# Patient Record
Sex: FEMALE | Race: BLACK OR AFRICAN AMERICAN | NOT HISPANIC OR LATINO | URBAN - METROPOLITAN AREA
[De-identification: names, ages, dates, MRNs, and addresses within clinical notes are randomized per-mention and may not be internally consistent; named-entity substitution may affect disease eponyms.]

---

## 2023-06-29 ENCOUNTER — INPATIENT (INPATIENT)
Facility: HOSPITAL | Age: 78
LOS: 4 days | Discharge: HOME | End: 2023-07-04
Attending: STUDENT IN AN ORGANIZED HEALTH CARE EDUCATION/TRAINING PROGRAM | Admitting: STUDENT IN AN ORGANIZED HEALTH CARE EDUCATION/TRAINING PROGRAM
Payer: SELF-PAY

## 2023-06-29 VITALS
SYSTOLIC BLOOD PRESSURE: 154 MMHG | RESPIRATION RATE: 18 BRPM | OXYGEN SATURATION: 98 % | DIASTOLIC BLOOD PRESSURE: 80 MMHG | HEART RATE: 88 BPM | TEMPERATURE: 98 F

## 2023-06-29 DIAGNOSIS — N17.9 ACUTE KIDNEY FAILURE, UNSPECIFIED: ICD-10-CM

## 2023-06-29 DIAGNOSIS — Z29.9 ENCOUNTER FOR PROPHYLACTIC MEASURES, UNSPECIFIED: ICD-10-CM

## 2023-06-29 DIAGNOSIS — Z90.710 ACQUIRED ABSENCE OF BOTH CERVIX AND UTERUS: Chronic | ICD-10-CM
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
## 2023-06-29 DIAGNOSIS — E04.9 NONTOXIC GOITER, UNSPECIFIED: ICD-10-CM

## 2023-06-29 DIAGNOSIS — S09.90XA UNSPECIFIED INJURY OF HEAD, INITIAL ENCOUNTER: ICD-10-CM

## 2023-06-29 DIAGNOSIS — I10 ESSENTIAL (PRIMARY) HYPERTENSION: ICD-10-CM

## 2023-06-29 DIAGNOSIS — Z98.890 OTHER SPECIFIED POSTPROCEDURAL STATES: Chronic | ICD-10-CM

## 2023-06-29 LAB
ALBUMIN SERPL ELPH-MCNC: 4.6 G/DL — SIGNIFICANT CHANGE UP (ref 3.3–5)
ALP SERPL-CCNC: 65 U/L — SIGNIFICANT CHANGE UP (ref 40–120)
ALT FLD-CCNC: 12 U/L — SIGNIFICANT CHANGE UP (ref 4–33)
ANION GAP SERPL CALC-SCNC: 8 MMOL/L — SIGNIFICANT CHANGE UP (ref 7–14)
APTT BLD: 28.7 SEC — SIGNIFICANT CHANGE UP (ref 27–36.3)
AST SERPL-CCNC: 30 U/L — SIGNIFICANT CHANGE UP (ref 4–32)
BASOPHILS # BLD AUTO: 0.07 K/UL — SIGNIFICANT CHANGE UP (ref 0–0.2)
BASOPHILS NFR BLD AUTO: 1 % — SIGNIFICANT CHANGE UP (ref 0–2)
BILIRUB SERPL-MCNC: 0.4 MG/DL — SIGNIFICANT CHANGE UP (ref 0.2–1.2)
BLD GP AB SCN SERPL QL: NEGATIVE — SIGNIFICANT CHANGE UP
BUN SERPL-MCNC: 24 MG/DL — HIGH (ref 7–23)
CALCIUM SERPL-MCNC: 9.6 MG/DL — SIGNIFICANT CHANGE UP (ref 8.4–10.5)
CHLORIDE SERPL-SCNC: 103 MMOL/L — SIGNIFICANT CHANGE UP (ref 98–107)
CO2 SERPL-SCNC: 25 MMOL/L — SIGNIFICANT CHANGE UP (ref 22–31)
CREAT SERPL-MCNC: 1.54 MG/DL — HIGH (ref 0.5–1.3)
EGFR: 34 ML/MIN/1.73M2 — LOW
EOSINOPHIL # BLD AUTO: 0.13 K/UL — SIGNIFICANT CHANGE UP (ref 0–0.5)
EOSINOPHIL NFR BLD AUTO: 1.9 % — SIGNIFICANT CHANGE UP (ref 0–6)
GLUCOSE SERPL-MCNC: 79 MG/DL — SIGNIFICANT CHANGE UP (ref 70–99)
HCT VFR BLD CALC: 36.4 % — SIGNIFICANT CHANGE UP (ref 34.5–45)
HGB BLD-MCNC: 11.9 G/DL — SIGNIFICANT CHANGE UP (ref 11.5–15.5)
IANC: 4.49 K/UL — SIGNIFICANT CHANGE UP (ref 1.8–7.4)
IMM GRANULOCYTES NFR BLD AUTO: 0.1 % — SIGNIFICANT CHANGE UP (ref 0–0.9)
INR BLD: 1.01 RATIO — SIGNIFICANT CHANGE UP (ref 0.88–1.16)
LIDOCAIN IGE QN: 18 U/L — SIGNIFICANT CHANGE UP (ref 7–60)
LYMPHOCYTES # BLD AUTO: 1.73 K/UL — SIGNIFICANT CHANGE UP (ref 1–3.3)
LYMPHOCYTES # BLD AUTO: 25.1 % — SIGNIFICANT CHANGE UP (ref 13–44)
MCHC RBC-ENTMCNC: 32.2 PG — SIGNIFICANT CHANGE UP (ref 27–34)
MCHC RBC-ENTMCNC: 32.7 GM/DL — SIGNIFICANT CHANGE UP (ref 32–36)
MCV RBC AUTO: 98.4 FL — SIGNIFICANT CHANGE UP (ref 80–100)
MONOCYTES # BLD AUTO: 0.46 K/UL — SIGNIFICANT CHANGE UP (ref 0–0.9)
MONOCYTES NFR BLD AUTO: 6.7 % — SIGNIFICANT CHANGE UP (ref 2–14)
NEUTROPHILS # BLD AUTO: 4.49 K/UL — SIGNIFICANT CHANGE UP (ref 1.8–7.4)
NEUTROPHILS NFR BLD AUTO: 65.2 % — SIGNIFICANT CHANGE UP (ref 43–77)
NRBC # BLD: 0 /100 WBCS — SIGNIFICANT CHANGE UP (ref 0–0)
NRBC # FLD: 0 K/UL — SIGNIFICANT CHANGE UP (ref 0–0)
PLATELET # BLD AUTO: 206 K/UL — SIGNIFICANT CHANGE UP (ref 150–400)
POTASSIUM SERPL-MCNC: 4.6 MMOL/L — SIGNIFICANT CHANGE UP (ref 3.5–5.3)
POTASSIUM SERPL-SCNC: 4.6 MMOL/L — SIGNIFICANT CHANGE UP (ref 3.5–5.3)
PROT SERPL-MCNC: 9 G/DL — HIGH (ref 6–8.3)
PROTHROM AB SERPL-ACNC: 11.7 SEC — SIGNIFICANT CHANGE UP (ref 10.5–13.4)
RBC # BLD: 3.7 M/UL — LOW (ref 3.8–5.2)
RBC # FLD: 12.2 % — SIGNIFICANT CHANGE UP (ref 10.3–14.5)
RH IG SCN BLD-IMP: POSITIVE — SIGNIFICANT CHANGE UP
SODIUM SERPL-SCNC: 136 MMOL/L — SIGNIFICANT CHANGE UP (ref 135–145)
T3 SERPL-MCNC: 103 NG/DL — SIGNIFICANT CHANGE UP (ref 80–200)
T4 AB SER-ACNC: 5.61 UG/DL — SIGNIFICANT CHANGE UP (ref 5.1–13)
TSH SERPL-MCNC: 1.2 UIU/ML — SIGNIFICANT CHANGE UP (ref 0.27–4.2)
WBC # BLD: 6.89 K/UL — SIGNIFICANT CHANGE UP (ref 3.8–10.5)
WBC # FLD AUTO: 6.89 K/UL — SIGNIFICANT CHANGE UP (ref 3.8–10.5)

## 2023-06-29 PROCEDURE — 99223 1ST HOSP IP/OBS HIGH 75: CPT

## 2023-06-29 PROCEDURE — 99285 EMERGENCY DEPT VISIT HI MDM: CPT

## 2023-06-29 PROCEDURE — 72170 X-RAY EXAM OF PELVIS: CPT | Mod: 26

## 2023-06-29 PROCEDURE — 73030 X-RAY EXAM OF SHOULDER: CPT | Mod: 26,RT

## 2023-06-29 PROCEDURE — 73080 X-RAY EXAM OF ELBOW: CPT | Mod: 26,RT

## 2023-06-29 PROCEDURE — 76770 US EXAM ABDO BACK WALL COMP: CPT | Mod: 26

## 2023-06-29 PROCEDURE — 99053 MED SERV 10PM-8AM 24 HR FAC: CPT

## 2023-06-29 PROCEDURE — 70450 CT HEAD/BRAIN W/O DYE: CPT | Mod: 26,MA

## 2023-06-29 PROCEDURE — 72125 CT NECK SPINE W/O DYE: CPT | Mod: 26,MA

## 2023-06-29 PROCEDURE — 71046 X-RAY EXAM CHEST 2 VIEWS: CPT | Mod: 26

## 2023-06-29 RX ORDER — LANOLIN ALCOHOL/MO/W.PET/CERES
3 CREAM (GRAM) TOPICAL AT BEDTIME
Refills: 0 | Status: DISCONTINUED | OUTPATIENT
Start: 2023-06-29 | End: 2023-07-04

## 2023-06-29 RX ORDER — ONDANSETRON 8 MG/1
4 TABLET, FILM COATED ORAL EVERY 8 HOURS
Refills: 0 | Status: DISCONTINUED | OUTPATIENT
Start: 2023-06-29 | End: 2023-07-04

## 2023-06-29 RX ORDER — ACETAMINOPHEN 500 MG
650 TABLET ORAL EVERY 6 HOURS
Refills: 0 | Status: DISCONTINUED | OUTPATIENT
Start: 2023-06-29 | End: 2023-06-30

## 2023-06-29 RX ADMIN — Medication 650 MILLIGRAM(S): at 17:22

## 2023-06-29 NOTE — ED ADULT NURSE NOTE - NSFALLRISKINTERV_ED_ALL_ED

## 2023-06-29 NOTE — H&P ADULT - PROBLEM SELECTOR PLAN 1
Mechanical fall at home   -Exam shows contusion to posterior scalp  -CT head and neck showed no acute injury   -Supportive measures   -PT eval   -Fall precautions

## 2023-06-29 NOTE — ED ADULT TRIAGE NOTE - CHIEF COMPLAINT QUOTE
mechanical fall this morning, hematoma to posterior head, endorsing no complaints- no blood thinners- hx HTN mechanical fall this morning, hematoma to posterior head, endorsing no complaints- no blood thinners-

## 2023-06-29 NOTE — H&P ADULT - NSHPLABSRESULTS_GEN_ALL_CORE
11.9   6.89  )-----------( 206      ( 29 Jun 2023 11:56 )             36.4     Hgb Trend: 11.9<--  06-29    136  |  103  |  24<H>  ----------------------------<  79  4.6   |  25  |  1.54<H>    Ca    9.6      29 Jun 2023 11:07    TPro  9.0<H>  /  Alb  4.6  /  TBili  0.4  /  DBili  x   /  AST  30  /  ALT  12  /  AlkPhos  65  06-29    Creatinine Trend: 1.54<--  PT/INR - ( 29 Jun 2023 11:56 )   PT: 11.7 sec;   INR: 1.01 ratio         PTT - ( 29 Jun 2023 11:56 )  PTT:28.7 sec      Urinalysis Basic - ( 29 Jun 2023 11:07 )    Color: x / Appearance: x / SG: x / pH: x  Gluc: 79 mg/dL / Ketone: x  / Bili: x / Urobili: x   Blood: x / Protein: x / Nitrite: x   Leuk Esterase: x / RBC: x / WBC x   Sq Epi: x / Non Sq Epi: x / Bacteria: x        CT head and neck as reviewed by the radiologist: Cervical spine CT: No acute fractures or dislocations.    Multilevel cervical spondylosis.    Enlarged multinodular thyroid goiter which is only partially imaged.   Retrosternal/mediastinal extension is not excluded. Recommend ultrasound   correlation.    Head CT: No acute intracranial hemorrhage, mass effect, or shift of the   midline structures.    Mild chronic white matter microvascular type changes.        Xray shoulder and elbow as reviewed by the radiologist: No acute fracture or dislocation.

## 2023-06-29 NOTE — ED PROVIDER NOTE - CARE PLAN
1 Principal Discharge DX:	Head trauma   Principal Discharge DX:	Head trauma  Secondary Diagnosis:	IRENE (acute kidney injury)  Secondary Diagnosis:	Thyroid goiter

## 2023-06-29 NOTE — ED PROVIDER NOTE - PROGRESS NOTE DETAILS
[facility] PGY2  ENT consulted for mass effect caused by thyroid goiter and recommends CTS consult for mediastinum involvement. Discussed with hospitalist - will admit for further management.

## 2023-06-29 NOTE — CONSULT NOTE ADULT - SUBJECTIVE AND OBJECTIVE BOX
CC: thyroid goiter    HPI: 77 y/o F with history of diabetes, hypertension, hyperlipidemia presents with pain to the back of her head after mechanical fall.  States that while she was taking her shower this morning, she went to turn down the faucet and then slipped fell backwards hitting the back of her head against the bathtub. ENT consulted for incidental finding of large thyroid goiter with tracheal deviation to the left and possible retrosternal/mediastinal extension in the CT head image.    Patient seen and examined at bedside.        PAST MEDICAL & SURGICAL HISTORY:    Allergies    No Known Allergies    Intolerances      MEDICATIONS  (STANDING):    MEDICATIONS  (PRN):      Social History: never smoker    Family history: No pertinent family history in first degree relatives    ROS:   ENT: all negative except as noted in HPI   CV: denies palpitations  Pulm: denies SOB, cough, hemoptysis  GI: denies change in appetite, indigestion, n/v  : denies pertinent urinary symptoms, urgency  Neuro: denies numbness/tingling, loss of sensation  Psych: denies anxiety  MS: denies muscle weakness, instability  Heme: denies easy bruising or bleeding  Endo: denies heat/cold intolerance, excessive sweating  Vascular: denies LE edema    Vital Signs Last 24 Hrs  T(C): 36.8 (29 Jun 2023 07:49), Max: 36.8 (29 Jun 2023 07:49)  T(F): 98.2 (29 Jun 2023 07:49), Max: 98.2 (29 Jun 2023 07:49)  HR: 88 (29 Jun 2023 07:49) (88 - 88)  BP: 154/80 (29 Jun 2023 07:49) (154/80 - 154/80)  BP(mean): --  RR: 18 (29 Jun 2023 07:49) (18 - 18)  SpO2: 98% (29 Jun 2023 07:49) (98% - 98%)    Parameters below as of 29 Jun 2023 07:49  Patient On (Oxygen Delivery Method): room air                              11.9   6.89  )-----------( 206      ( 29 Jun 2023 11:56 )             36.4         PT/INR - ( 29 Jun 2023 11:56 )   PT: 11.7 sec;   INR: 1.01 ratio         PTT - ( 29 Jun 2023 11:56 )  PTT:28.7 sec    PHYSICAL EXAM:  Gen: NAD  Skin: No rashes, bruises, or lesions  Head: Normocephalic, Atraumatic  Face: no edema, erythema, or fluctuance. Parotid glands soft without mass  Eyes: no scleral injection  Ears: No evidence of any fluid drainage. No mastoid tenderness, erythema, or ear bulging  Nose: Nares bilaterally patent, no discharge  Mouth: No stridor, no drooling, no trismus.  Mucosa moist, tongue/uvula midline, oropharynx clear  Neck: Flat, supple, no lymphadenopathy, trachea midline, no masses  Lymphatic: No lymphadenopathy  Resp: breathing easily, no stridor  CV: no peripheral edema/cyanosis  GI: nondistended   Peripheral vascular: no JVD or edema  Neuro: facial nerve intact, no facial droop    Fiberoptic Indirect laryngoscopy:  (Scope #2 used)    IMAGING/ADDITIONAL STUDIES:   < from: CT Cervical Spine No Cont (06.29.23 @ 10:34) >  ACC: [id number] EXAM:  CT CERVICAL SPINE   ORDERED BY: MARGO BOONE     ACC: [id number] EXAM:  CT BRAIN   ORDERED BY: MARGO BOONE     PROCEDURE DATE:  06/29/2023          INTERPRETATION:  .    CLINICAL INFORMATION: Head trauma. Trauma.    TECHNIQUE: Transaxial CT images were obtained through the cervical spine   and head without the administration of IV contrast. Sagittal and coronal   reformatted images were obtained from the source data.    COMPARISON: None available.    FINDINGS:    NONCONTRAST CERVICAL SPINE CT: No acute fractures or dislocations are   notable. The cervical alignment is maintained. There is no loss of   vertebral body height. Scattered degenerative lucencies and areas of   sclerosis are notable throughout the vertebral bodies and facets.   Multilevel degenerative disc disease is noted, worst at the C3-C4, C5-C6,   and at C6-C7 levels with reactive endplate changes. Multilevel facet   arthrosis is notable. The dens is intact. The lateral masses of C1 are   not displaced. There is no prevertebral soft tissue swelling.    Evaluation of the intraspinal contents is limited on CT modality. There   are variable degrees of multilevel canal and foraminal stenosis secondary   to endplate degenerative changes and facet arthrosis.    A diffusely enlarged heterogeneous thyroid gland is seen which contains   multiple nodules and is only partially imaged. Mass effect is seen upon   the trachea which is shifted to the left side and minimally narrowed. The   trachea is widely patent Retrosternal/substernal extension is excluded.    Arterial vascular calcifications are seen. The imaged lung apices appear   clear.    NONCONTRAST HEAD CT: There is no acute intracranial hemorrhage, mass   effect, shift of the midline structures, herniation, extra-axial fluid   collection, or hydrocephalus.    There is diffuse cerebral volume loss with prominence of the sulci,   fissures, and cisternal spaces which is normal for the patient's age.   There is mild deep and periventricular white matter hypoattenuation   statistically compatible with microvascular changes given calcific   atherosclerotic disease of the intracranial arteries.    The paranasal sinuses and tympanomastoid cavities are clear. The   calvarium is intact. The imaged orbits are unremarkable.    IMPRESSION:    Cervical spine CT: No acute fractures or dislocations.    Multilevel cervical spondylosis.    Enlarged multinodular thyroid goiter which is only partially imaged.   Retrosternal/mediastinal extension is not excluded. Recommend ultrasound   correlation.    Head CT: No acute intracranial hemorrhage, mass effect, or shift of the   midline structures.    Mild chronic white matter microvascular type changes.    --- End of Report ---            TAMMI LINDSEY MD; Attending Radiologist  This document has been electronically signed. Jun 29 2023 10:51AM    < end of copied text >   CC: thyroid goiter    HPI: 79 y/o F with history of diabetes, hypertension, hyperlipidemia presents with pain to the back of her head after mechanical fall.  States that while she was taking her shower this morning, she went to turn down the faucet and then slipped fell backwards hitting the back of her head against the bathtub. ENT consulted for incidental finding of large thyroid goiter with tracheal deviation to the left and possible retrosternal/mediastinal extension in the CT head image.    Patient seen and examined at bedside. No acute complaints. Patient states she is aware her thyroid issue, had biopsy done in 2022 in [place], path report shows normal thyroid tissue. Denies SOB, difficulty breathing, dysphagia to liquid or solid, and chest pain.      PAST MEDICAL & SURGICAL HISTORY:    Allergies    No Known Allergies    Intolerances      MEDICATIONS  (STANDING):    MEDICATIONS  (PRN):      Social History: never smoker    Family history: No pertinent family history in first degree relatives    ROS:   ENT: all negative except as noted in HPI   CV: denies palpitations  Pulm: denies SOB, cough, hemoptysis  GI: denies change in appetite, indigestion, n/v  : denies pertinent urinary symptoms, urgency  Neuro: denies numbness/tingling, loss of sensation  Psych: denies anxiety  MS: denies muscle weakness, instability  Heme: denies easy bruising or bleeding  Endo: denies heat/cold intolerance, excessive sweating  Vascular: denies LE edema    Vital Signs Last 24 Hrs  T(C): 36.8 (29 Jun 2023 07:49), Max: 36.8 (29 Jun 2023 07:49)  T(F): 98.2 (29 Jun 2023 07:49), Max: 98.2 (29 Jun 2023 07:49)  HR: 88 (29 Jun 2023 07:49) (88 - 88)  BP: 154/80 (29 Jun 2023 07:49) (154/80 - 154/80)  BP(mean): --  RR: 18 (29 Jun 2023 07:49) (18 - 18)  SpO2: 98% (29 Jun 2023 07:49) (98% - 98%)    Parameters below as of 29 Jun 2023 07:49  Patient On (Oxygen Delivery Method): room air                              11.9   6.89  )-----------( 206      ( 29 Jun 2023 11:56 )             36.4         PT/INR - ( 29 Jun 2023 11:56 )   PT: 11.7 sec;   INR: 1.01 ratio         PTT - ( 29 Jun 2023 11:56 )  PTT:28.7 sec    PHYSICAL EXAM:  Gen: NAD  Skin: No rashes, bruises, or lesions  Head: Normocephalic, Atraumatic  Face: no edema, erythema, or fluctuance. Parotid glands soft without mass  Eyes: no scleral injection  Ears: No evidence of any fluid drainage. No mastoid tenderness, erythema, or ear bulging  Nose: Nares bilaterally patent, no discharge  Mouth: No stridor, no drooling, no trismus.  Mucosa moist, tongue/uvula midline, oropharynx clear  Neck: + diffuse goiter in the neck. Flat, supple, no lymphadenopathy, trachea midline, no masses  Lymphatic: No lymphadenopathy  Resp: breathing easily, no stridor  CV: no peripheral edema/cyanosis  GI: nondistended   Peripheral vascular: no JVD or edema  Neuro: facial nerve intact, no facial droop    Fiberoptic Indirect laryngoscopy:  (Scope #2 used)    IMAGING/ADDITIONAL STUDIES:   < from: CT Cervical Spine No Cont (06.29.23 @ 10:34) >  ACC: [id number] EXAM:  CT CERVICAL SPINE   ORDERED BY: MARGO BOONE     ACC: [id number] EXAM:  CT BRAIN   ORDERED BY: MARGO BOONE     PROCEDURE DATE:  06/29/2023          INTERPRETATION:  .    CLINICAL INFORMATION: Head trauma. Trauma.    TECHNIQUE: Transaxial CT images were obtained through the cervical spine   and head without the administration of IV contrast. Sagittal and coronal   reformatted images were obtained from the source data.    COMPARISON: None available.    FINDINGS:    NONCONTRAST CERVICAL SPINE CT: No acute fractures or dislocations are   notable. The cervical alignment is maintained. There is no loss of   vertebral body height. Scattered degenerative lucencies and areas of   sclerosis are notable throughout the vertebral bodies and facets.   Multilevel degenerative disc disease is noted, worst at the C3-C4, C5-C6,   and at C6-C7 levels with reactive endplate changes. Multilevel facet   arthrosis is notable. The dens is intact. The lateral masses of C1 are   not displaced. There is no prevertebral soft tissue swelling.    Evaluation of the intraspinal contents is limited on CT modality. There   are variable degrees of multilevel canal and foraminal stenosis secondary   to endplate degenerative changes and facet arthrosis.    A diffusely enlarged heterogeneous thyroid gland is seen which contains   multiple nodules and is only partially imaged. Mass effect is seen upon   the trachea which is shifted to the left side and minimally narrowed. The   trachea is widely patent Retrosternal/substernal extension is excluded.    Arterial vascular calcifications are seen. The imaged lung apices appear   clear.    NONCONTRAST HEAD CT: There is no acute intracranial hemorrhage, mass   effect, shift of the midline structures, herniation, extra-axial fluid   collection, or hydrocephalus.    There is diffuse cerebral volume loss with prominence of the sulci,   fissures, and cisternal spaces which is normal for the patient's age.   There is mild deep and periventricular white matter hypoattenuation   statistically compatible with microvascular changes given calcific   atherosclerotic disease of the intracranial arteries.    The paranasal sinuses and tympanomastoid cavities are clear. The   calvarium is intact. The imaged orbits are unremarkable.    IMPRESSION:    Cervical spine CT: No acute fractures or dislocations.    Multilevel cervical spondylosis.    Enlarged multinodular thyroid goiter which is only partially imaged.   Retrosternal/mediastinal extension is not excluded. Recommend ultrasound   correlation.    Head CT: No acute intracranial hemorrhage, mass effect, or shift of the   midline structures.    Mild chronic white matter microvascular type changes.    --- End of Report ---            TAMMI LINDSEY MD; Attending Radiologist  This document has been electronically signed. Jun 29 2023 10:51AM    < end of copied text >

## 2023-06-29 NOTE — H&P ADULT - PROBLEM SELECTOR PLAN 2
CT cervical spine showed thyroid goiter   -Pt has known hx of thyroid goiter   -Denies any choking sensation or SOB   -TSH and T4 are WNL   -ENT recs appreciated   -Plan to obtain CT neck and chest with IV contrast once Scr improves   -IR consult pending CT neck and chest CT cervical spine showed thyroid goiter   -Pt has known hx of thyroid goiter. She underwent biopsy in [place] about 2-3 years ago and was told that it is not cancerous. Advised patient to obtain those reports.   -Denies any choking sensation or SOB   -TSH and T4 are WNL   -ENT recs appreciated   -Plan to obtain CT neck and chest with IV contrast once Scr improves   -IR consult pending CT neck and chest

## 2023-06-29 NOTE — H&P ADULT - NSHPPHYSICALEXAM_GEN_ALL_CORE
Vital Signs Last 24 Hrs  T(C): 36.1 (29 Jun 2023 13:11), Max: 36.8 (29 Jun 2023 07:49)  T(F): 97 (29 Jun 2023 13:11), Max: 98.2 (29 Jun 2023 07:49)  HR: 69 (29 Jun 2023 13:11) (69 - 88)  BP: 163/80 (29 Jun 2023 13:11) (154/80 - 163/80)  BP(mean): --  RR: 18 (29 Jun 2023 13:11) (18 - 18)  SpO2: 99% (29 Jun 2023 13:11) (98% - 99%)    Parameters below as of 29 Jun 2023 13:11  Patient On (Oxygen Delivery Method): room air    GENERAL: NAD, well-developed  HEENT: Normocephalic ,contusion to posterior scalp. Conjunctiva and sclera clear, oral mucosa moist, clear w/o any exudate   NECK: Supple, No JVD  CHEST/LUNG: Clear to auscultation bilaterally; No wheeze  HEART: Regular rate and rhythm; No murmurs, rubs, or gallops  ABDOMEN: Soft, Nontender, Nondistended; Bowel sounds present  EXTREMITIES:  2+ Peripheral Pulses, No clubbing, cyanosis, or edema  PSYCH: AAOx3, normal affect  NEUROLOGY: non-focal, cranial nerve 2-12 grossly intact, strength 5/5 in all extremities, sensation grossly intact  SKIN: No rashes or lesions

## 2023-06-29 NOTE — CONSULT NOTE ADULT - ASSESSMENT
77 y/o F with history of diabetes, hypertension, hyperlipidemia presents with pain to the back of her head after mechanical fall.  States that while she was taking her shower this morning, she went to turn down the faucet and then slipped fell backwards hitting the back of her head against the bathtub. ENT consulted for incidental finding of large thyroid goiter with tracheal deviation to the left and possible retrosternal/mediastinal extension in the CT head image. No acute respiratory distress, no shortness of breath.  77 y/o F with history of diabetes, hypertension, hyperlipidemia and known thyroid goiter presents with pain to the back of her head after mechanical fall. ENT consulted for incidental finding of large thyroid goiter with tracheal deviation to the left and possible retrosternal/mediastinal extension in the CT head image. Patient has diffuse goiter. Prior biopsy done in [place] on 2022 shows normal thyroid tissue per patient. Denies compressive symptoms.

## 2023-06-29 NOTE — H&P ADULT - HISTORY OF PRESENT ILLNESS
77 yo F with PMhx of DMII, HTN, HLD, and known thyroid goiter presents to the ED after a fall. Patient had a mechanical fall in the bathroom. She slipped and fell in the shower when she was trying to turn the faucet. He hit her head but did not lose consciousness. Afterwards, she was able to get up by herself and was able to ambulate. However, she developed pain in the back of her head and R shoulder. It was followed by swelling and worsening pain, prompting her to come to the ED. Otherwise, she denies any fever, chills, chest pain, SOB, palpitations, dysphagia, or LE edema.   In the ED, her vitals were notable for HTN. Labs were notable for IRENE. CT head and neck shoulder no new fracture, acute bleeding or stroke. However, CT neck showed Enlarged multinodular thyroid goiter. ENT was consulted.

## 2023-06-29 NOTE — H&P ADULT - ASSESSMENT
79 yo F with PMhx of DMII, HTN, HLD, and known thyroid goiter presents to the ED after a mechanical fall, found to have thyroid goiter.

## 2023-06-29 NOTE — ED PROVIDER NOTE - ATTENDING CONTRIBUTION TO CARE
This is a 78-year-old female with a past medical history of diabetes hypertension hyperlipidemia she presents with pain to the back of her head after mechanical fall trying to get out of the shower.  She states she was taking a shower and it was too hot so she went to turn on the faucet she slipped backwards hitting her head.  No loss of consciousness.  She states she is having some pain to the back of her head as well as her right shoulder and right arm.  She states she feels back to baseline and she was able to ambulate after the fall.  She denies any nausea any vomiting any dizziness any chest pain any shortness of breath.  She states that she does have neuropathy to her lower extremities unchanged.  She states she has not had insurance for very long time therefore she does not have a primary care doctor for follow-up.  General: Well appearing, well nourished, in no distress  Head: Normocephalic ,contusion to posterior scalp   Eyes: Conjunctiva clear, sclera non-icteric, EOM intact, PERRL  Mouth: Mucous membranes moist, no mucosal lesions.  Neck: Supple  Heart: Regular rate and rhythm, no murmur or gallop  Lungs: Clear to auscultation  Abdomen: soft, no tenderness, non distended, no organomegaly, masses  Back: Spine normal without deformity or tenderness, no CVA tenderness  Extremities: No amputations or deformities, cyanosis, edema  Musculoskeletal: No crepitation, defects or decreased range of motion, strength intact throughout, pulses intact  Neurologic: No gross deficits CN II-XII intact Sensation intact no dysmetria   Psychiatric: Oriented X3, normal mood and affect  Skin: Warm,dry. Good turgor, no rash, unusual bruising, Nailbed changes old in nature maceration between toes on right and left no evidence of infection contusion to scalp and right posterior forearm     Will check fingerstick ekg  pt fall mechanical in nature no blood thinner use but due to age will obtain imaging ct brain to r/o intracranial pathology and image R arm to rule out fracture

## 2023-06-29 NOTE — H&P ADULT - NSHPREVIEWOFSYSTEMS_GEN_ALL_CORE
REVIEW OF SYSTEMS:    CONSTITUTIONAL: No weakness, fevers or chills  EYES/ENT: No visual changes;  No vertigo or throat pain. +headache  NECK: No pain or stiffness  RESPIRATORY: No cough, wheezing, hemoptysis; No shortness of breath  CARDIOVASCULAR: No chest pain or palpitations  GASTROINTESTINAL: No abdominal or epigastric pain. No nausea, vomiting, or hematemesis; No diarrhea or constipation. No melena or hematochezia.  GENITOURINARY: No dysuria, frequency or hematuria  NEUROLOGICAL: No numbness or weakness  MUSCULOSKELETAL: No joint pain, no muscle ache   SKIN: No itching, burning, rashes, or lesions   All other review of systems is negative unless indicated above.

## 2023-06-29 NOTE — ED PROVIDER NOTE - CLINICAL SUMMARY MEDICAL DECISION MAKING FREE TEXT BOX
Patient is a 78-year-old female with history of diabetes, hypertension, hyperlipidemia presents with pain to the back of her head after mechanical fall.  States that while she was taking her shower this morning, she went to turn down the faucet and then slipped fell backwards hitting the back of her head against the bathtub.  Currently complaining of pain to the back of the head, right shoulder and right elbow.  Denies any recent fever, chills, night sweats, nausea, vomiting, chest pain, shortness of breath, abdominal pain, dysuria, hematuria, urinary frequency, diarrhea, bloody or black stool.     Vitals: I have reviewed the patients vital signs  General: Well dressed, well appearing, no acute distress  HEENT: Normocephalic, airway patent, hematoma noted in the posterior occipital region, no abrasions/laceration noted   Eyes: EOMI, tracking appropriately  Neck: no tracheal deviation, no JVD  Chest/Lungs: no trauma, symmetric chest rise, speaking in complete sentences, no WOB, CTA BL   Heart: skin and extremities well perfused, regular rate and rhythm  Abdomen: soft, nontender and nondistended   Neuro: A+Ox3, CN II-XI intact  MSK: 5/5 strength and normal sensation in all 4 extremities  Skin: contusion noted in the dorsal aspect of proximal forearm     Likely contusion after trauma.  Will rule out intracranial hemorrhage with CT head.  We will also obtain x-ray of the right shoulder and right elbow to assess for possible injuries.  We will also obtain x-ray of the chest and pelvis given the mechanism.  Patient is otherwise well-appearing and hemodynamically stable.  Disposition pending work-up.

## 2023-06-29 NOTE — ED ADULT NURSE NOTE - CHIEF COMPLAINT QUOTE
mechanical fall this morning, hematoma to posterior head, endorsing no complaints- no blood thinners-

## 2023-06-29 NOTE — ED ADULT NURSE NOTE - OBJECTIVE STATEMENT
78 year old female patient aox4  brought in by EMS S/P fall. 78 year old female patient aox4  brought in by EMS S/P fall. Reported that while taking shower this morning she slipped on the soapy water then fell.  Denied lOC, lightheaded, dizziness, HA, nausea but hit the back of her head.  Hematoma noted to the back of her head, Hx of dm and htn. Also taking aspirin.  CT scan ordered as well as Xrays. Will  continue to monitor for any changes.

## 2023-06-29 NOTE — CONSULT NOTE ADULT - PROBLEM SELECTOR RECOMMENDATION 9
- Ultrasound of the thyroid  - Ultrasoung guided biopsy of the thyroid  - TSH, FT4  - Please consult CTS for the mediastinum involvement of the thyroid goiter  - Will discuss the case with head and neck attending. - CT neck and chest with IV contrast  - Please contact IR for ultrasound guided biopsy of the thyroid gland  - TSH, FT4  - Case discussed with Dr. Croft

## 2023-06-30 LAB
A1C WITH ESTIMATED AVERAGE GLUCOSE RESULT: 4.7 % — SIGNIFICANT CHANGE UP (ref 4–5.6)
ALBUMIN SERPL ELPH-MCNC: 4.7 G/DL — SIGNIFICANT CHANGE UP (ref 3.3–5)
ALP SERPL-CCNC: 66 U/L — SIGNIFICANT CHANGE UP (ref 40–120)
ALT FLD-CCNC: 9 U/L — SIGNIFICANT CHANGE UP (ref 4–33)
ANION GAP SERPL CALC-SCNC: 13 MMOL/L — SIGNIFICANT CHANGE UP (ref 7–14)
AST SERPL-CCNC: 29 U/L — SIGNIFICANT CHANGE UP (ref 4–32)
BASOPHILS # BLD AUTO: 0.1 K/UL — SIGNIFICANT CHANGE UP (ref 0–0.2)
BASOPHILS NFR BLD AUTO: 1.8 % — SIGNIFICANT CHANGE UP (ref 0–2)
BILIRUB SERPL-MCNC: 0.6 MG/DL — SIGNIFICANT CHANGE UP (ref 0.2–1.2)
BUN SERPL-MCNC: 25 MG/DL — HIGH (ref 7–23)
CALCIUM SERPL-MCNC: 9.9 MG/DL — SIGNIFICANT CHANGE UP (ref 8.4–10.5)
CHLORIDE SERPL-SCNC: 104 MMOL/L — SIGNIFICANT CHANGE UP (ref 98–107)
CHOLEST SERPL-MCNC: 242 MG/DL — HIGH
CO2 SERPL-SCNC: 22 MMOL/L — SIGNIFICANT CHANGE UP (ref 22–31)
CREAT ?TM UR-MCNC: 71 MG/DL — SIGNIFICANT CHANGE UP
CREAT SERPL-MCNC: 1.46 MG/DL — HIGH (ref 0.5–1.3)
EGFR: 36 ML/MIN/1.73M2 — LOW
EOSINOPHIL # BLD AUTO: 0.27 K/UL — SIGNIFICANT CHANGE UP (ref 0–0.5)
EOSINOPHIL NFR BLD AUTO: 4.8 % — SIGNIFICANT CHANGE UP (ref 0–6)
ESTIMATED AVERAGE GLUCOSE: 88 — SIGNIFICANT CHANGE UP
GLUCOSE SERPL-MCNC: 76 MG/DL — SIGNIFICANT CHANGE UP (ref 70–99)
HCT VFR BLD CALC: 37.8 % — SIGNIFICANT CHANGE UP (ref 34.5–45)
HDLC SERPL-MCNC: 55 MG/DL — SIGNIFICANT CHANGE UP
HGB BLD-MCNC: 11.9 G/DL — SIGNIFICANT CHANGE UP (ref 11.5–15.5)
IANC: 2.97 K/UL — SIGNIFICANT CHANGE UP (ref 1.8–7.4)
IMM GRANULOCYTES NFR BLD AUTO: 0.2 % — SIGNIFICANT CHANGE UP (ref 0–0.9)
LIPID PNL WITH DIRECT LDL SERPL: 175 MG/DL — HIGH
LYMPHOCYTES # BLD AUTO: 1.84 K/UL — SIGNIFICANT CHANGE UP (ref 1–3.3)
LYMPHOCYTES # BLD AUTO: 32.9 % — SIGNIFICANT CHANGE UP (ref 13–44)
MCHC RBC-ENTMCNC: 31.4 PG — SIGNIFICANT CHANGE UP (ref 27–34)
MCHC RBC-ENTMCNC: 31.5 GM/DL — LOW (ref 32–36)
MCV RBC AUTO: 99.7 FL — SIGNIFICANT CHANGE UP (ref 80–100)
MONOCYTES # BLD AUTO: 0.41 K/UL — SIGNIFICANT CHANGE UP (ref 0–0.9)
MONOCYTES NFR BLD AUTO: 7.3 % — SIGNIFICANT CHANGE UP (ref 2–14)
NEUTROPHILS # BLD AUTO: 2.97 K/UL — SIGNIFICANT CHANGE UP (ref 1.8–7.4)
NEUTROPHILS NFR BLD AUTO: 53 % — SIGNIFICANT CHANGE UP (ref 43–77)
NON HDL CHOLESTEROL: 187 MG/DL — HIGH
NRBC # BLD: 0 /100 WBCS — SIGNIFICANT CHANGE UP (ref 0–0)
NRBC # FLD: 0 K/UL — SIGNIFICANT CHANGE UP (ref 0–0)
PLATELET # BLD AUTO: 193 K/UL — SIGNIFICANT CHANGE UP (ref 150–400)
POTASSIUM SERPL-MCNC: 4.5 MMOL/L — SIGNIFICANT CHANGE UP (ref 3.5–5.3)
POTASSIUM SERPL-SCNC: 4.5 MMOL/L — SIGNIFICANT CHANGE UP (ref 3.5–5.3)
PROT SERPL-MCNC: 8.8 G/DL — HIGH (ref 6–8.3)
RBC # BLD: 3.79 M/UL — LOW (ref 3.8–5.2)
RBC # FLD: 12.4 % — SIGNIFICANT CHANGE UP (ref 10.3–14.5)
SODIUM SERPL-SCNC: 139 MMOL/L — SIGNIFICANT CHANGE UP (ref 135–145)
SODIUM UR-SCNC: 139 MMOL/L — SIGNIFICANT CHANGE UP
TRIGL SERPL-MCNC: 62 MG/DL — SIGNIFICANT CHANGE UP
UUN UR-MCNC: 469 MG/DL — SIGNIFICANT CHANGE UP
WBC # BLD: 5.6 K/UL — SIGNIFICANT CHANGE UP (ref 3.8–10.5)
WBC # FLD AUTO: 5.6 K/UL — SIGNIFICANT CHANGE UP (ref 3.8–10.5)

## 2023-06-30 PROCEDURE — 99232 SBSQ HOSP IP/OBS MODERATE 35: CPT

## 2023-06-30 RX ORDER — HEPARIN SODIUM 5000 [USP'U]/ML
5000 INJECTION INTRAVENOUS; SUBCUTANEOUS EVERY 8 HOURS
Refills: 0 | Status: DISCONTINUED | OUTPATIENT
Start: 2023-06-30 | End: 2023-07-04

## 2023-06-30 RX ORDER — SODIUM CHLORIDE 9 MG/ML
1000 INJECTION, SOLUTION INTRAVENOUS
Refills: 0 | Status: DISCONTINUED | OUTPATIENT
Start: 2023-06-30 | End: 2023-07-04

## 2023-06-30 RX ADMIN — HEPARIN SODIUM 5000 UNIT(S): 5000 INJECTION INTRAVENOUS; SUBCUTANEOUS at 21:39

## 2023-06-30 RX ADMIN — SODIUM CHLORIDE 100 MILLILITER(S): 9 INJECTION, SOLUTION INTRAVENOUS at 18:16

## 2023-06-30 NOTE — PHYSICAL THERAPY INITIAL EVALUATION ADULT - PERTINENT HX OF CURRENT PROBLEM, REHAB EVAL
77 yo F with PMhx of DMII, HTN, HLD, and known thyroid goiter presents to the ED after a fall. Patient had a mechanical fall in the bathroom. She slipped and fell in the shower when she was trying to turn the faucet. He hit her head but did not lose consciousness. Afterwards, she was able to get up by herself and was able to ambulate. However, she developed pain in the back of her head and R shoulder. It was followed by swelling and worsening pain, prompting her to come to the ED. Otherwise, she denies any fever, chills, chest pain, SOB, palpitations, dysphagia, or LE edema.

## 2023-06-30 NOTE — PROGRESS NOTE ADULT - PROBLEM SELECTOR PLAN 3
Scr elevated to 1.54. Patient follows Dr. Marcie Manzanares outpatient and has seen a nephrologist (?Dr. Clement) for a ?mass previously.  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 Manzanares

## 2023-06-30 NOTE — PHYSICAL THERAPY INITIAL EVALUATION ADULT - NSPTDISCHREC_GEN_A_CORE
Outpatient PT in order to address knee discomfort.  Pt encouraged to use her straight cane when ambulating in home.

## 2023-06-30 NOTE — PROGRESS NOTE ADULT - PROBLEM SELECTOR PLAN 1
Mechanical fall at home   -Exam shows contusion to posterior scalp  -CT head and neck showed no acute injury   -Supportive measures   -PT evaluation. Recommends a mccracken. Suggested d/c to rehab with PT vs Home. Daughter making decision with patient about option  -Fall precautions

## 2023-06-30 NOTE — PROGRESS NOTE ADULT - SUBJECTIVE AND OBJECTIVE BOX
PROGRESS NOTE:   Authored by Dr. Nile Matos MD (PGY-1). Pager [facility] / [facility]     Patient is a 78y old  Female who presents with a chief complaint of Fall, thyroid goiter    SUBJECTIVE / OVERNIGHT EVENTS:  No acute events overnight.       MEDICATIONS  (STANDING):    MEDICATIONS  (PRN):  acetaminophen     Tablet .. 650 milliGRAM(s) Oral every 6 hours PRN Temp greater or equal to 38C (100.4F), Mild Pain (1 - 3)  aluminum hydroxide/magnesium hydroxide/simethicone Suspension 30 milliLiter(s) Oral every 4 hours PRN Dyspepsia  melatonin 3 milliGRAM(s) Oral at bedtime PRN Insomnia  ondansetron Injectable 4 milliGRAM(s) IV Push every 8 hours PRN Nausea and/or Vomiting      CAPILLARY BLOOD GLUCOSE        I&O's Summary      PHYSICAL EXAM:  Vital Signs Last 24 Hrs  T(C): 36.4 (30 Jun 2023 12:39), Max: 36.6 (29 Jun 2023 20:02)  T(F): 97.5 (30 Jun 2023 12:39), Max: 97.9 (29 Jun 2023 20:02)  HR: 72 (30 Jun 2023 12:39) (72 - 81)  BP: 142/72 (30 Jun 2023 12:39) (133/74 - 142/72)  BP(mean): --  RR: 17 (30 Jun 2023 12:39) (16 - 17)  SpO2: 100% (30 Jun 2023 12:39) (97% - 100%)    Parameters below as of 30 Jun 2023 12:39  Patient On (Oxygen Delivery Method): room air        CONSTITUTIONAL: NAD, well-developed  HEET: MMM, EOMI, PERRLA  NECK: Bilateral smooth thyroid mass noteed  RESPIRATORY: Normal respiratory effort; lungs are clear to auscultation bilaterally  CARDIOVASCULAR: Regular rate and rhythm, normal S1 and S2, systolic murmur noted; No lower extremity edema; Peripheral pulses are 2+ bilaterally  ABDOMEN: Nontender to palpatio, no rebound/guarding  MUSCULOSKELETAL: no clubbing or cyanosis of digits; no joint swelling or tenderness to palpation  NEURO: Moving all four extremities, sensation grossly intact  PSYCH: A+O to person, place, and time; affect appropriate  SKIN: No rash    LABS:                        11.9   5.60  )-----------( 193      ( 30 Jun 2023 06:15 )             37.8     06-30    139  |  104  |  25<H>  ----------------------------<  76  4.5   |  22  |  1.46<H>    Ca    9.9      30 Jun 2023 06:15    TPro  8.8<H>  /  Alb  4.7  /  TBili  0.6  /  DBili  x   /  AST  29  /  ALT  9   /  AlkPhos  66  06-30    PT/INR - ( 29 Jun 2023 11:56 )   PT: 11.7 sec;   INR: 1.01 ratio         PTT - ( 29 Jun 2023 11:56 )  PTT:28.7 sec      Urinalysis Basic - ( 30 Jun 2023 06:15 )    Color: x / Appearance: x / SG: x / pH: x  Gluc: 76 mg/dL / Ketone: x  / Bili: x / Urobili: x   Blood: x / Protein: x / Nitrite: x   Leuk Esterase: x / RBC: x / WBC x   Sq Epi: x / Non Sq Epi: x / Bacteria: x

## 2023-06-30 NOTE — PROGRESS NOTE ADULT - PROBLEM SELECTOR PLAN 2
CT cervical spine showed thyroid goiter   -Pt has known hx of thyroid goiter. She underwent biopsy in [place] about 2-3 years ago and was told that it is not cancerous. Advised patient to obtain those reports.   -Denies any choking sensation or SOB   -TSH and T4 are WNL   -ENT recs appreciated   -Plan to obtain CT neck and chest with IV contrast once Scr improves   -IR consult pending CT neck and chest  - Patient and daughter would like to go ahead and try to get a biopsy

## 2023-06-30 NOTE — PROGRESS NOTE ADULT - ATTENDING COMMENTS
Patient is a 79yo F with PMH significant for T2DM, HTN, HLD, CKD, and known thyroid goiter who presented after a mechanical fall while trying turn on the faucet in her shower. She has had 4 falls in total, most recent prior fall was 2021.    Seen by PT, recommended for outpatient PT. No significant contusions noted on exam today. No fractures or dislocations noted on trauma imaging.    Cr elevated – 1.54 on presentation, then 1.46 on repeat. Trend for now. Check FENa. Unclear what patient’s baseline Cr is, will ask PMD/daughter for collateral.    Patient was planned for work-up of thyroid goiter inpatient but seems to have had prior work-up done. ENT evaluated patient, had recommended CT neck and chest, and IR for US-guided biopsy. Has had a biopsy performed in the past as an outpatient, patient states this was 2022 in [place] and did not show malignancy. The goiter has been present for 6 years. Will see if her daughter or PMD can offer collateral. Will defer contrast imaging for now given possible IRENE. If collateral obtained from daughter/PMD, may potentially be deferred to outpatient follow-up.

## 2023-06-30 NOTE — PHYSICAL THERAPY INITIAL EVALUATION ADULT - ADDITIONAL COMMENTS
Pt reports she lives in a home with her daughter, steps to negotiate, has fell in the past, and uses a cane when needed.

## 2023-07-01 LAB
ANION GAP SERPL CALC-SCNC: 9 MMOL/L — SIGNIFICANT CHANGE UP (ref 7–14)
APPEARANCE UR: CLEAR — SIGNIFICANT CHANGE UP
APPEARANCE UR: CLEAR — SIGNIFICANT CHANGE UP
BACTERIA # UR AUTO: ABNORMAL
BACTERIA # UR AUTO: NEGATIVE — SIGNIFICANT CHANGE UP
BILIRUB UR-MCNC: NEGATIVE — SIGNIFICANT CHANGE UP
BILIRUB UR-MCNC: NEGATIVE — SIGNIFICANT CHANGE UP
BUN SERPL-MCNC: 24 MG/DL — HIGH (ref 7–23)
CALCIUM SERPL-MCNC: 9.8 MG/DL — SIGNIFICANT CHANGE UP (ref 8.4–10.5)
CHLORIDE SERPL-SCNC: 104 MMOL/L — SIGNIFICANT CHANGE UP (ref 98–107)
CO2 SERPL-SCNC: 23 MMOL/L — SIGNIFICANT CHANGE UP (ref 22–31)
COLOR SPEC: SIGNIFICANT CHANGE UP
COLOR SPEC: SIGNIFICANT CHANGE UP
CREAT SERPL-MCNC: 1.47 MG/DL — HIGH (ref 0.5–1.3)
DIFF PNL FLD: ABNORMAL
DIFF PNL FLD: NEGATIVE — SIGNIFICANT CHANGE UP
EGFR: 36 ML/MIN/1.73M2 — LOW
EPI CELLS # UR: 1 /HPF — SIGNIFICANT CHANGE UP (ref 0–5)
EPI CELLS # UR: 2 /HPF — SIGNIFICANT CHANGE UP (ref 0–5)
GLUCOSE SERPL-MCNC: 86 MG/DL — SIGNIFICANT CHANGE UP (ref 70–99)
GLUCOSE UR QL: NEGATIVE — SIGNIFICANT CHANGE UP
GLUCOSE UR QL: NEGATIVE — SIGNIFICANT CHANGE UP
HCT VFR BLD CALC: 35.9 % — SIGNIFICANT CHANGE UP (ref 34.5–45)
HGB BLD-MCNC: 11.3 G/DL — LOW (ref 11.5–15.5)
HYALINE CASTS # UR AUTO: 0 /LPF — SIGNIFICANT CHANGE UP (ref 0–7)
KETONES UR-MCNC: NEGATIVE — SIGNIFICANT CHANGE UP
KETONES UR-MCNC: NEGATIVE — SIGNIFICANT CHANGE UP
LEUKOCYTE ESTERASE UR-ACNC: ABNORMAL
LEUKOCYTE ESTERASE UR-ACNC: NEGATIVE — SIGNIFICANT CHANGE UP
MAGNESIUM SERPL-MCNC: 2.2 MG/DL — SIGNIFICANT CHANGE UP (ref 1.6–2.6)
MCHC RBC-ENTMCNC: 31.1 PG — SIGNIFICANT CHANGE UP (ref 27–34)
MCHC RBC-ENTMCNC: 31.5 GM/DL — LOW (ref 32–36)
MCV RBC AUTO: 98.9 FL — SIGNIFICANT CHANGE UP (ref 80–100)
NITRITE UR-MCNC: NEGATIVE — SIGNIFICANT CHANGE UP
NITRITE UR-MCNC: POSITIVE
NRBC # BLD: 0 /100 WBCS — SIGNIFICANT CHANGE UP (ref 0–0)
NRBC # FLD: 0 K/UL — SIGNIFICANT CHANGE UP (ref 0–0)
PH UR: 6 — SIGNIFICANT CHANGE UP (ref 5–8)
PH UR: 7.5 — SIGNIFICANT CHANGE UP (ref 5–8)
PHOSPHATE SERPL-MCNC: 3 MG/DL — SIGNIFICANT CHANGE UP (ref 2.5–4.5)
PLATELET # BLD AUTO: 191 K/UL — SIGNIFICANT CHANGE UP (ref 150–400)
POTASSIUM SERPL-MCNC: 5.2 MMOL/L — SIGNIFICANT CHANGE UP (ref 3.5–5.3)
POTASSIUM SERPL-SCNC: 5.2 MMOL/L — SIGNIFICANT CHANGE UP (ref 3.5–5.3)
PROT UR-MCNC: ABNORMAL
PROT UR-MCNC: ABNORMAL
RBC # BLD: 3.63 M/UL — LOW (ref 3.8–5.2)
RBC # FLD: 12.3 % — SIGNIFICANT CHANGE UP (ref 10.3–14.5)
RBC CASTS # UR COMP ASSIST: 1 /HPF — SIGNIFICANT CHANGE UP (ref 0–4)
RBC CASTS # UR COMP ASSIST: 2 /HPF — SIGNIFICANT CHANGE UP (ref 0–4)
SODIUM SERPL-SCNC: 136 MMOL/L — SIGNIFICANT CHANGE UP (ref 135–145)
SP GR SPEC: 1.01 — SIGNIFICANT CHANGE UP (ref 1.01–1.05)
SP GR SPEC: 1.01 — SIGNIFICANT CHANGE UP (ref 1.01–1.05)
UROBILINOGEN FLD QL: SIGNIFICANT CHANGE UP
UROBILINOGEN FLD QL: SIGNIFICANT CHANGE UP
WBC # BLD: 5.87 K/UL — SIGNIFICANT CHANGE UP (ref 3.8–10.5)
WBC # FLD AUTO: 5.87 K/UL — SIGNIFICANT CHANGE UP (ref 3.8–10.5)
WBC UR QL: 1 /HPF — SIGNIFICANT CHANGE UP (ref 0–5)
WBC UR QL: 10 /HPF — HIGH (ref 0–5)

## 2023-07-01 PROCEDURE — 99232 SBSQ HOSP IP/OBS MODERATE 35: CPT

## 2023-07-01 RX ORDER — ATORVASTATIN CALCIUM 80 MG/1
40 TABLET, FILM COATED ORAL AT BEDTIME
Refills: 0 | Status: DISCONTINUED | OUTPATIENT
Start: 2023-07-01 | End: 2023-07-04

## 2023-07-01 RX ORDER — TAMSULOSIN HYDROCHLORIDE 0.4 MG/1
0.4 CAPSULE ORAL AT BEDTIME
Refills: 0 | Status: DISCONTINUED | OUTPATIENT
Start: 2023-07-01 | End: 2023-07-04

## 2023-07-01 RX ADMIN — HEPARIN SODIUM 5000 UNIT(S): 5000 INJECTION INTRAVENOUS; SUBCUTANEOUS at 13:27

## 2023-07-01 RX ADMIN — HEPARIN SODIUM 5000 UNIT(S): 5000 INJECTION INTRAVENOUS; SUBCUTANEOUS at 21:38

## 2023-07-01 RX ADMIN — TAMSULOSIN HYDROCHLORIDE 0.4 MILLIGRAM(S): 0.4 CAPSULE ORAL at 21:38

## 2023-07-01 RX ADMIN — HEPARIN SODIUM 5000 UNIT(S): 5000 INJECTION INTRAVENOUS; SUBCUTANEOUS at 05:38

## 2023-07-01 RX ADMIN — ATORVASTATIN CALCIUM 40 MILLIGRAM(S): 80 TABLET, FILM COATED ORAL at 21:38

## 2023-07-01 NOTE — PROGRESS NOTE ADULT - ATTENDING COMMENTS
Patient is a 77yo F with PMH significant for T2DM, HTN, HLD, CKD, and known thyroid goiter who presented after a mechanical fall while trying turn on the faucet in her shower. She has had 4 falls in total, most recent prior fall was 2021.    Seen by PT, recommended for outpatient PT. No significant contusions noted on exam today. No fractures or dislocations noted on trauma imaging.    Cr elevated – 1.54 on presentation, then 1.46 on repeat. Trend for now. 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 Patient being bladder scanned and has urinary retention now s/p straight cath x2. If continues to retain urine will place ayala and consult urology. UA negative.     Patient was planned for work-up of thyroid goiter inpatient but seems to have had prior work-up done. ENT evaluated patient, had recommended CT neck and chest, and IR for US-guided biopsy. Has had a biopsy performed in the past as an outpatient, patient states this was 2022 in [place] and did not show malignancy. The goiter has been present for 6 years. Will see if her daughter or PMD can offer collateral. Will defer contrast imaging for now given possible IRENE. If collateral obtained from daughter/PMD, may potentially be deferred to outpatient follow-up. Pt seen and examined on 7/1/23.     Patient is a 79yo F with PMH significant for T2DM, HTN, HLD, CKD, and known thyroid goiter who presented after a mechanical fall while trying turn on the faucet in her shower. She has had 4 falls in total, most recent prior fall was 2021.    Seen by PT, recommended for outpatient PT. No significant contusions noted on exam today. No fractures or dislocations noted on trauma imaging.    Cr elevated – 1.54 on presentation, then 1.46 on repeat. Trend for now. 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 Patient being bladder scanned and has urinary retention now s/p straight cath x2. If continues to retain urine will place ayala and consult urology. UA negative.     Patient was planned for work-up of thyroid goiter inpatient but seems to have had prior work-up done. ENT evaluated patient, had recommended CT neck and chest, and IR for US-guided biopsy. Has had a biopsy performed in the past as an outpatient, patient states this was 2022 in [place] and did not show malignancy. The goiter has been present for 6 years. Will see if her daughter or PMD can offer collateral. Will defer contrast imaging for now given possible IRENE. If collateral obtained from daughter/PMD, may potentially be deferred to outpatient follow-up.

## 2023-07-01 NOTE — PROGRESS NOTE ADULT - PROBLEM SELECTOR PLAN 1
Mechanical fall at home   -Exam shows contusion to posterior scalp  -CT head and neck showed no acute injury   -Supportive measures   -PT evaluation. Recommends a mccracken. D/C with PT scripts  -Fall precautions

## 2023-07-01 NOTE — CHART NOTE - NSCHARTNOTEFT_GEN_A_CORE
Pt. seen and examined. Plan discussed with resident team. Formal progress note to follow. Briefly, pt. is a 79 y/o F with PMH of known thyroid goiter and presenting with fall. Fall is mechanical in nature. Found to have IRENE which is likely CKD however renal u/s showing moderate right hydronephrosis and mildly distended bladder with probable large right bladder   diverticulum containing layering debris. Patient being bladder scanned and has urinary retention now s/p straight cath x2. If continues to retain urine will place ayala and consult urology. UA negative. Formal progress note to follow.

## 2023-07-01 NOTE — PROGRESS NOTE ADULT - SUBJECTIVE AND OBJECTIVE BOX
PROGRESS NOTE:   Authored by Dr. Nile Matos MD (PGY-1). Pager [facility] / [facility]     Patient is a 78y old  Female who presents with a chief complaint of Fall, thyroid goiter    SUBJECTIVE / OVERNIGHT EVENTS:  No acute events overnight.     Patient complains of subacute right shoulder pain. Localized to the right shoulder and upper arm. Says causes decreased ROM    MEDICATIONS  (STANDING):  atorvastatin 40 milliGRAM(s) Oral at bedtime  heparin   Injectable 5000 Unit(s) SubCutaneous every 8 hours  lactated ringers. 1000 milliLiter(s) (100 mL/Hr) IV Continuous <Continuous>  tamsulosin 0.4 milliGRAM(s) Oral at bedtime    MEDICATIONS  (PRN):  aluminum hydroxide/magnesium hydroxide/simethicone Suspension 30 milliLiter(s) Oral every 4 hours PRN Dyspepsia  melatonin 3 milliGRAM(s) Oral at bedtime PRN Insomnia  ondansetron Injectable 4 milliGRAM(s) IV Push every 8 hours PRN Nausea and/or Vomiting  oxycodone    5 mG/acetaminophen 325 mG 1 Tablet(s) Oral every 6 hours PRN mild and moderate pain    CAPILLARY BLOOD GLUCOSE      I&O's Summary    2023 07:  -  2023 07:00  --------------------------------------------------------  IN: 0 mL / OUT: 750 mL / NET: -750 mL    2023 07:01  -  2023 18:44  --------------------------------------------------------  IN: 0 mL / OUT: 1000 mL / NET: -1000 mL      PHYSICAL EXAM:  Vital Signs Last 24 Hrs  T(C): 36.2 (2023 12:34), Max: 36.8 (2023 21:31)  T(F): 97.2 (2023 12:34), Max: 98.2 (2023 21:31)  HR: 80 (2023 12:34) (77 - 80)  BP: 141/68 (2023 12:34) (137/78 - 158/87)  BP(mean): --  RR: 17 (2023 12:34) (17 - 17)  SpO2: 100% (2023 12:34) (100% - 100%)    Parameters below as of 2023 12:34  Patient On (Oxygen Delivery Method): room air      CONSTITUTIONAL: NAD, well-developed  HEET: MMM, EOMI, PERRLA  NECK: supple  RESPIRATORY: Normal respiratory effort; lungs are clear to auscultation bilaterally  CARDIOVASCULAR: Regular rate and rhythm, normal S1 and S2, MICHELLE; No lower extremity edema; Peripheral pulses are 2+ bilaterally  ABDOMEN: Nontender to palpation, normoactive bowel sounds, no rebound/guarding; No hepatosplenomegaly; smooth mass noted R. abdomen  MUSCULOSKELETAL: no clubbing or cyanosis of digits; no joint swelling or tenderness to palpation. B/L 5/5 UE strength. Decreased ROM with right arm - unable to lift above head.   NEURO: Moving all four extremities, sensation grossly intact  PSYCH: A+O to person, place, and time; affect appropriate  SKIN: No rash    LABS:                        11.3   5.87  )-----------( 191      ( 2023 07:03 )             35.9     07-01    136  |  104  |  24<H>  ----------------------------<  86  5.2   |  23  |  1.47<H>    Ca    9.8      2023 07:03  Phos  3.0     07-01  Mg     2.20     07-01    TPro  8.8<H>  /  Alb  4.7  /  TBili  0.6  /  DBili  x   /  AST  29  /  ALT  9   /  AlkPhos  66  06-30    Urinalysis Basic - ( 2023 15:20 )    Color: Light Yellow / Appearance: Clear / S.015 / pH: x  Gluc: x / Ketone: Negative  / Bili: Negative / Urobili: <2 mg/dL   Blood: x / Protein: Trace / Nitrite: Negative   Leuk Esterase: Large / RBC: 2 /HPF / WBC 10 /HPF   Sq Epi: x / Non Sq Epi: x / Bacteria: Negative

## 2023-07-01 NOTE — PROGRESS NOTE ADULT - PROBLEM SELECTOR PLAN 2
CT cervical spine showed thyroid goiter   -Pt has known hx of thyroid goiter. She underwent biopsy in [place] about 2-3 years ago and was told that it is not cancerous. Advised patient to obtain those reports.   -Denies any choking sensation or SOB   -TSH and T4 are WNL   -ENT recs appreciated   -Plan to obtain CT neck and chest with IV contrast once Scr improves   -IR consult pending CT neck and chest result  - Plan to get biopsy outpatient

## 2023-07-01 NOTE — PROGRESS NOTE ADULT - PROBLEM SELECTOR PLAN 3
Scr elevated to 1.54. Patient follows Dr. Marcie Manzanares outpatient and has seen a nephrologist (?Dr. Clement) for a ?mass previously. Will try to obtain collateral from nephrologist and Dr. 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 Patient aware will need Haro  - UA obtained

## 2023-07-02 ENCOUNTER — TRANSCRIPTION ENCOUNTER (OUTPATIENT)
Age: 78
End: 2023-07-02

## 2023-07-02 LAB
ALBUMIN SERPL ELPH-MCNC: 4.1 G/DL — SIGNIFICANT CHANGE UP (ref 3.3–5)
ALP SERPL-CCNC: 62 U/L — SIGNIFICANT CHANGE UP (ref 40–120)
ALT FLD-CCNC: 13 U/L — SIGNIFICANT CHANGE UP (ref 4–33)
ANION GAP SERPL CALC-SCNC: 13 MMOL/L — SIGNIFICANT CHANGE UP (ref 7–14)
AST SERPL-CCNC: 28 U/L — SIGNIFICANT CHANGE UP (ref 4–32)
BILIRUB SERPL-MCNC: 0.4 MG/DL — SIGNIFICANT CHANGE UP (ref 0.2–1.2)
BUN SERPL-MCNC: 25 MG/DL — HIGH (ref 7–23)
CALCIUM SERPL-MCNC: 9.7 MG/DL — SIGNIFICANT CHANGE UP (ref 8.4–10.5)
CHLORIDE SERPL-SCNC: 103 MMOL/L — SIGNIFICANT CHANGE UP (ref 98–107)
CO2 SERPL-SCNC: 21 MMOL/L — LOW (ref 22–31)
CREAT SERPL-MCNC: 1.43 MG/DL — HIGH (ref 0.5–1.3)
EGFR: 37 ML/MIN/1.73M2 — LOW
GLUCOSE SERPL-MCNC: 93 MG/DL — SIGNIFICANT CHANGE UP (ref 70–99)
HCT VFR BLD CALC: 34.3 % — LOW (ref 34.5–45)
HGB BLD-MCNC: 11.1 G/DL — LOW (ref 11.5–15.5)
MAGNESIUM SERPL-MCNC: 2.2 MG/DL — SIGNIFICANT CHANGE UP (ref 1.6–2.6)
MCHC RBC-ENTMCNC: 31 PG — SIGNIFICANT CHANGE UP (ref 27–34)
MCHC RBC-ENTMCNC: 32.4 GM/DL — SIGNIFICANT CHANGE UP (ref 32–36)
MCV RBC AUTO: 95.8 FL — SIGNIFICANT CHANGE UP (ref 80–100)
NRBC # BLD: 0 /100 WBCS — SIGNIFICANT CHANGE UP (ref 0–0)
NRBC # FLD: 0 K/UL — SIGNIFICANT CHANGE UP (ref 0–0)
PHOSPHATE SERPL-MCNC: 2.9 MG/DL — SIGNIFICANT CHANGE UP (ref 2.5–4.5)
PLATELET # BLD AUTO: 203 K/UL — SIGNIFICANT CHANGE UP (ref 150–400)
POTASSIUM SERPL-MCNC: 4.5 MMOL/L — SIGNIFICANT CHANGE UP (ref 3.5–5.3)
POTASSIUM SERPL-SCNC: 4.5 MMOL/L — SIGNIFICANT CHANGE UP (ref 3.5–5.3)
PROT SERPL-MCNC: 8.1 G/DL — SIGNIFICANT CHANGE UP (ref 6–8.3)
RBC # BLD: 3.58 M/UL — LOW (ref 3.8–5.2)
RBC # FLD: 12.3 % — SIGNIFICANT CHANGE UP (ref 10.3–14.5)
SODIUM SERPL-SCNC: 137 MMOL/L — SIGNIFICANT CHANGE UP (ref 135–145)
WBC # BLD: 5.85 K/UL — SIGNIFICANT CHANGE UP (ref 3.8–10.5)
WBC # FLD AUTO: 5.85 K/UL — SIGNIFICANT CHANGE UP (ref 3.8–10.5)

## 2023-07-02 PROCEDURE — 99232 SBSQ HOSP IP/OBS MODERATE 35: CPT

## 2023-07-02 PROCEDURE — 76770 US EXAM ABDO BACK WALL COMP: CPT | Mod: 26

## 2023-07-02 RX ORDER — ACETAMINOPHEN WITH CODEINE 300MG-30MG
1 TABLET ORAL
Refills: 0 | DISCHARGE

## 2023-07-02 RX ORDER — DICLOFENAC SODIUM 75 MG/1
1 TABLET, DELAYED RELEASE ORAL
Refills: 0 | DISCHARGE

## 2023-07-02 RX ADMIN — Medication 3 MILLIGRAM(S): at 21:10

## 2023-07-02 RX ADMIN — TAMSULOSIN HYDROCHLORIDE 0.4 MILLIGRAM(S): 0.4 CAPSULE ORAL at 21:10

## 2023-07-02 RX ADMIN — HEPARIN SODIUM 5000 UNIT(S): 5000 INJECTION INTRAVENOUS; SUBCUTANEOUS at 21:10

## 2023-07-02 RX ADMIN — HEPARIN SODIUM 5000 UNIT(S): 5000 INJECTION INTRAVENOUS; SUBCUTANEOUS at 05:21

## 2023-07-02 RX ADMIN — HEPARIN SODIUM 5000 UNIT(S): 5000 INJECTION INTRAVENOUS; SUBCUTANEOUS at 15:19

## 2023-07-02 RX ADMIN — ATORVASTATIN CALCIUM 40 MILLIGRAM(S): 80 TABLET, FILM COATED ORAL at 21:14

## 2023-07-02 NOTE — DISCHARGE NOTE PROVIDER - NSDCMRMEDTOKEN_GEN_ALL_CORE_FT
hydroCHLOROthiazide 25 mg oral tablet: 1 tab(s) orally once a day  Outpatient Physical Therapy   ICD-10: W01 Fall on same level from slipping, tripping, and stumblin-3/week for 6 weeks  ramipril 10 mg oral capsule: 1 tab(s) orally once a day  simvastatin 40 mg oral tablet: 1 tab(s) orally once a day   simvastatin 40 mg oral tablet: 1 tab(s) orally once a day  tamsulosin 0.4 mg oral capsule: 1 cap(s) orally once a day (at bedtime)

## 2023-07-02 NOTE — DISCHARGE NOTE PROVIDER - ATTENDING DISCHARGE PHYSICAL EXAMINATION:
.  VITAL SIGNS:  T(C): 36.6 (07-04-23 @ 05:22), Max: 37.1 (07-03-23 @ 21:02)  T(F): 97.9 (07-04-23 @ 05:22), Max: 98.7 (07-03-23 @ 21:02)  HR: 90 (07-04-23 @ 05:22) (86 - 90)  BP: 119/62 (07-04-23 @ 05:22) (119/62 - 145/67)  BP(mean): --  RR: 16 (07-04-23 @ 05:22) (16 - 18)  SpO2: 97% (07-04-23 @ 05:22) (97% - 99%)  Wt(kg): --    PHYSICAL EXAM:    Constitutional: WDWN resting comfortably in bed; NAD  Head: NC/AT  Eyes: PERRL, EOMI, clear conjunctiva  ENT: no nasal discharge; uvula midline, no oropharyngeal erythema or exudates;   Neck: supple; thyromegaly  Respiratory: CTA B/L; no W/R/R, no retractions  Cardiac: +S1/S2;   Gastrointestinal: soft, NT/ND; no rebound or guarding; +BSx4  Genitourinary:  Haro   Back: spine midline, no bony tenderness or step-offs; no CVAT B/L  Extremities: WWP, no clubbing or cyanosis; no peripheral edema  Musculoskeletal: NROM x4; no joint swelling, tenderness or erythema  Neurologic: AAOx3; CNII-XII grossly intact; no focal deficits  Psychiatric: affect and characteristics of appearance, verbalizations, behaviors are appropriate

## 2023-07-02 NOTE — PROGRESS NOTE ADULT - TIME BILLING
Time-based billing (NON-critical care).     35 minutes spent on total encounter; more than 50% of the visit was spent counseling and / or coordinating care by the attending physician.  The necessity of the time spent during the encounter on this date of service was due to:     review of laboratory data, radiology results, consultants' recommendations, documentation in [place], discussion with patient and interdisciplinary staff (such as , social workers, etc). Interventions were performed as documented above.
Time-based billing (NON-critical care).     35 minutes spent on total encounter; more than 50% of the visit was spent counseling and / or coordinating care by the attending physician.  The necessity of the time spent during the encounter on this date of service was due to:     review of laboratory data, radiology results, consultants' recommendations, documentation in [place], discussion with patient and interdisciplinary staff (such as , social workers, etc). Interventions were performed as documented above.
Time-based billing (NON-critical care).     35 minutes spent on total encounter; more than 50% of the visit was spent counseling and / or coordinating care by the attending physician.  The necessity of the time spent during the encounter on this date of service was due to:     review of laboratory data, radiology results, consultants' recommendations, documentation in [place], discussion with patient and interdisciplinary staff (such as , social workers, etc). Interventions were performed as documented above.

## 2023-07-02 NOTE — CONSULT NOTE ADULT - ASSESSMENT
79 yo F with PMhx of salpingoopherectomy and hysterectomy, DMII, HTN, HLD, and known thyroid goiter presents to the ED after a fall, now with urinary retention and US showing moderate right hydronephrosis requiring a ayala. Creatinine downtrending to 1.43 from 1.54 since admission.     Plan:  TO BE DISCUSSED WITH ATTENDING      77 yo F with PMhx of salpingoopherectomy and hysterectomy, DMII, HTN, HLD, and known thyroid goiter presents to the ED after a fall, now with urinary retention and US showing moderate right hydronephrosis requiring a ayala. Creatinine downtrending to 1.43 from 1.54 since admission.     Plan:  - Creatinine slowly trending down, if patient is going to be discharged please repeat creatinine in the AM   - If creatinine improving, discharge with a ayala and patient can follow up in urology clinic for TOV    Case discussed with Dr. Rivera.  [facility]  [address] [place] [address]  [phone]

## 2023-07-02 NOTE — DISCHARGE NOTE PROVIDER - NSDCFUADDAPPT_GEN_ALL_CORE_FT
Please call [phone] and set an appointment with urology for trial of void/ayala removal assessment.  Please call [phone] and set an appointment with urology for trial of void/ayala removal assessment. Also, set an appointment with Dr. Manzanares to complete CT head and neck so that you may then follow up with ENT regarding your thyroid goiter.     APPTS ARE READY TO BE MADE: [X] YES    Best Family or Patient Contact (if needed):    Additional Information about above appointments (if needed):    1:   2:   3:     Other comments or requests:    Please call [phone] and set an appointment with urology for trial of void/ayala removal assessment. Also, set an appointment with Dr. Manzanares to complete CT head and neck so that you may then follow up with ENT regarding your thyroid goiter.     APPTS ARE READY TO BE MADE: [X] YES    Best Family or Patient Contact (if needed):    Additional Information about above appointments (if needed):    1:   2:   3:     Other comments or requests:   Patient/Caregiver would like assistance but will contact us to schedule upon insurance activation. Patient was scheduled with Dr. Jamari Mesa [facility]11[facility]23 [facility] [id number] [facility]  Please call [phone] and set an appointment with urology for trial of void/ayala removal assessment. Also, set an appointment with Dr. Manzanares to complete CT head and neck so that you may then follow up with ENT regarding your thyroid goiter.     APPTS ARE READY TO BE MADE: [X] YES    Best Family or Patient Contact (if needed):    Additional Information about above appointments (if needed):    1:   2:   3:     Other comments or requests:   Patient/Caregiver would like assistance but will contact us to schedule upon insurance activation. Patient was scheduled with Dr. Jamari Mesa on 7/11/23 at 2:30pm at [address] [place] [address]   Patient was scheduled for an appointment on  7/14 2:30pm at [facility] with  Urology Clinic. Patient/Caregiver was advised of appointment details.

## 2023-07-02 NOTE — DISCHARGE NOTE PROVIDER - NSDCCPCAREPLAN_GEN_ALL_CORE_FT
PRINCIPAL DISCHARGE DIAGNOSIS  Diagnosis: Head trauma  Assessment and Plan of Treatment: You had a fall at home and was brought to the hospital to be evaluated. Scans of your head, shoulder, and back were negative for broken bones and bleeding.      SECONDARY DISCHARGE DIAGNOSES  Diagnosis: IRENE (acute kidney injury)  Assessment and Plan of Treatment: To better look at your thyroid goiter, we wanted to give you a dedicated scan of your head and neck with contrast agent. However, your kidney function was poor and unlikely to handle the contrast well. We tried looking for a reason for the poor kidney function. We looked at the kidneys and bladder and found your right kidney and bladder were enlarged. Urine tests was not concerned for infection as a reason. We later noticed your bladder was not completely emptying after urination so we had to place a tube into your bladder to help remove the urine.    Diagnosis: Thyroid goiter  Assessment and Plan of Treatment: You have an enlarged neck mass that has been there for several years. You mentioned it was evaluated previously and the biopsy results came back normal. We wanted to follow up with the mass again. To do this, we wanted to better visualize the mass using imaging with contrast and then take a biopsy. This mass does not appear to be interfering with your everyday life and will be evaluated outside of the hospital

## 2023-07-02 NOTE — CONSULT NOTE ADULT - SUBJECTIVE AND OBJECTIVE BOX
HPI  77 yo F with PMhx of salpingoopherectomy and hysterectomy, DMII, HTN, HLD, and known thyroid goiter presents to the ED after a fall. Patient had a mechanical fall in the bathroom. She slipped and fell in the shower when she was trying to turn the faucet. He hit her head but did not lose consciousness. Afterwards, she was able to get up by herself and was able to ambulate. However, she developed pain in the back of her head and R shoulder. It was followed by swelling and worsening pain, prompting her to come to the ED. Otherwise, she denies any fever, chills, chest pain, SOB, palpitations, dysphagia, or LE edema. In the ED, her vitals were notable for HTN. Labs were notable for IRENE. CT head and neck shoulder no new fracture, acute bleeding or stroke. However, CT neck showed Enlarged multinodular thyroid goiter. ENT was consulted.     Urology consulted for urinary retention. On admission patients creatinine was elevated to 1.54, patient does not know her baseline. The patient follows a nephrologist in [place] for a ?mass previously, team is trying obtain more information. US of kidneys from 6/29 shows moderate right hydronephrosis and a mildly distended bladder with a large right diverticulum along with debris in the collecting system. Tea obtained a PVR showing >350cc and straight cathed her x2, now with a ayala placed 7/1. US was repeated today with wet read showing similar hydronephrosis. UA showing sterile pyuria. UCx has not been obtained. Notably today the patients WBC is 5.85, Hgb 11.1, and Cr has come down to 1.43. She denies any urologic history, history of retention, dysuria, hematuria, fevers, chills, N/V. She states that she has been trouble peeing since her fall.     PAST MEDICAL & SURGICAL HISTORY:  Essential hypertension      Hyperlipidemia      Thyroid goiter      H/O: hysterectomy      H/O lumpectomy          MEDICATIONS  (STANDING):  atorvastatin 40 milliGRAM(s) Oral at bedtime  heparin   Injectable 5000 Unit(s) SubCutaneous every 8 hours  lactated ringers. 1000 milliLiter(s) (100 mL/Hr) IV Continuous <Continuous>  tamsulosin 0.4 milliGRAM(s) Oral at bedtime    MEDICATIONS  (PRN):  aluminum hydroxide/magnesium hydroxide/simethicone Suspension 30 milliLiter(s) Oral every 4 hours PRN Dyspepsia  melatonin 3 milliGRAM(s) Oral at bedtime PRN Insomnia  ondansetron Injectable 4 milliGRAM(s) IV Push every 8 hours PRN Nausea and/or Vomiting  oxycodone    5 mG/acetaminophen 325 mG 1 Tablet(s) Oral every 6 hours PRN mild and moderate pain      FAMILY HISTORY:  No pertinent family history in first degree relatives        Allergies    No Known Allergies    Intolerances        SOCIAL HISTORY:    REVIEW OF SYSTEMS: Otherwise negative as stated in HPI    Physical Exam  Vital signs  T(C): 36.7 (07-02-23 @ 12:12), Max: 36.8 (07-01-23 @ 21:45)  HR: 86 (07-02-23 @ 12:12)  BP: 137/75 (07-02-23 @ 12:12)  SpO2: 98% (07-02-23 @ 12:12)  Wt(kg): --    Output    OUT:    Indwelling Catheter - Urethral (mL): 600 mL    Intermittent Catheterization - Urethral (mL): 820 mL    Voided (mL): 600 mL  Total OUT: 2020 mL    Total NET: -2020 mL          Gen: NAD  Pulm: No respiratory distress	  CV: RRR  GI: S/ND/NT  : no CVA tenderness bilaterally, ayala draining CYU  MSK: moves all 4 limbs spontaneously    LABS:      07-02 @ 06:20    WBC 5.85  / Hct 34.3  / SCr 1.43     07-01 @ 07:03    WBC 5.87  / Hct 35.9  / SCr 1.47     07-02    137  |  103  |  25<H>  ----------------------------<  93  4.5   |  21<L>  |  1.43<H>    Ca    9.7      02 Jul 2023 06:20  Phos  2.9     07-02  Mg     2.20     07-02    TPro  8.1  /  Alb  4.1  /  TBili  0.4  /  DBili  x   /  AST  28  /  ALT  13  /  AlkPhos  62  07-02      Urinalysis Basic - ( 02 Jul 2023 06:20 )    Color: x / Appearance: x / SG: x / pH: x  Gluc: 93 mg/dL / Ketone: x  / Bili: x / Urobili: x   Blood: x / Protein: x / Nitrite: x   Leuk Esterase: x / RBC: x / WBC x   Sq Epi: x / Non Sq Epi: x / Bacteria: x      RADIOLOGY:  < from: US Kidney and Bladder (06.29.23 @ 18:43) >  ACC: [id number] EXAM:  US KIDNEYS AND BLADDER   ORDERED BY: PAM LINO     PROCEDURE DATE:  06/29/2023          INTERPRETATION:  CLINICAL INFORMATION: Acute kidney injury.    COMPARISON: None available.    TECHNIQUE: Sonography of the kidneys and bladder. Limited study secondary   to poor acoustic windows.    FINDINGS:  Right kidney: 9.0 cm. Moderate hydronephrosis with debris in the   collecting system. No obstructing calculi visualized.    Left kidney: 8.0 cm. No hydronephrosis.    Urinary bladder: Mildly distended. Probable large right bladder   diverticulum containing layering echogenic material. Additional echogenic   debris's..    IMPRESSION:  *  Moderate right hydronephrosis.  *  Mildly distended bladder with probable large right bladder   diverticulum containing layering debris.  *  Debris in the collecting system and bladder. Correlate with urinalysis.    < end of copied text >       HPI  79 yo F with PMhx of salpingoopherectomy and hysterectomy, DMII, HTN, HLD, and known thyroid goiter presents to the ED after a fall. Patient had a mechanical fall in the bathroom. She slipped and fell in the shower when she was trying to turn the faucet. He hit her head but did not lose consciousness. Afterwards, she was able to get up by herself and was able to ambulate. However, she developed pain in the back of her head and R shoulder. It was followed by swelling and worsening pain, prompting her to come to the ED. Otherwise, she denies any fever, chills, chest pain, SOB, palpitations, dysphagia, or LE edema. In the ED, her vitals were notable for HTN. Labs were notable for IRENE. CT head and neck shoulder no new fracture, acute bleeding or stroke. However, CT neck showed Enlarged multinodular thyroid goiter. ENT was consulted.     Urology- consulted for urinary retention. On admission patients creatinine was elevated to 1.54, patient does not know her baseline. The patient follows a nephrologist in [place] for a ?mass previously, team is trying obtain more information. US of kidneys from 6/29 shows moderate right hydronephrosis and a mildly distended bladder with a large right diverticulum along with debris in the collecting system. Tea obtained a PVR showing >350cc and straight cathed her x2, now with a ayala placed 7/1. US was repeated today with wet read showing similar hydronephrosis. UA showing sterile pyuria. UCx has not been obtained. Notably today the patients WBC is 5.85, Hgb 11.1, and Cr has come down to 1.43. She denies any urologic history, history of retention, dysuria, hematuria, fevers, chills, N/V. She states that she has been trouble peeing since her fall.     PAST MEDICAL & SURGICAL HISTORY:  Essential hypertension      Hyperlipidemia      Thyroid goiter      H/O: hysterectomy      H/O lumpectomy          MEDICATIONS  (STANDING):  atorvastatin 40 milliGRAM(s) Oral at bedtime  heparin   Injectable 5000 Unit(s) SubCutaneous every 8 hours  lactated ringers. 1000 milliLiter(s) (100 mL/Hr) IV Continuous <Continuous>  tamsulosin 0.4 milliGRAM(s) Oral at bedtime    MEDICATIONS  (PRN):  aluminum hydroxide/magnesium hydroxide/simethicone Suspension 30 milliLiter(s) Oral every 4 hours PRN Dyspepsia  melatonin 3 milliGRAM(s) Oral at bedtime PRN Insomnia  ondansetron Injectable 4 milliGRAM(s) IV Push every 8 hours PRN Nausea and/or Vomiting  oxycodone    5 mG/acetaminophen 325 mG 1 Tablet(s) Oral every 6 hours PRN mild and moderate pain      FAMILY HISTORY:  No pertinent family history in first degree relatives        Allergies    No Known Allergies    Intolerances        SOCIAL HISTORY:    REVIEW OF SYSTEMS: Otherwise negative as stated in HPI    Physical Exam  Vital signs  T(C): 36.7 (07-02-23 @ 12:12), Max: 36.8 (07-01-23 @ 21:45)  HR: 86 (07-02-23 @ 12:12)  BP: 137/75 (07-02-23 @ 12:12)  SpO2: 98% (07-02-23 @ 12:12)  Wt(kg): --    Output    OUT:    Indwelling Catheter - Urethral (mL): 600 mL    Intermittent Catheterization - Urethral (mL): 820 mL    Voided (mL): 600 mL  Total OUT: 2020 mL    Total NET: -2020 mL          Gen: NAD  Pulm: No respiratory distress	  CV: RRR  GI: S/ND/NT  : no CVA tenderness bilaterally, ayala draining CYU  MSK: moves all 4 limbs spontaneously    LABS:      07-02 @ 06:20    WBC 5.85  / Hct 34.3  / SCr 1.43     07-01 @ 07:03    WBC 5.87  / Hct 35.9  / SCr 1.47     07-02    137  |  103  |  25<H>  ----------------------------<  93  4.5   |  21<L>  |  1.43<H>    Ca    9.7      02 Jul 2023 06:20  Phos  2.9     07-02  Mg     2.20     07-02    TPro  8.1  /  Alb  4.1  /  TBili  0.4  /  DBili  x   /  AST  28  /  ALT  13  /  AlkPhos  62  07-02      Urinalysis Basic - ( 02 Jul 2023 06:20 )    Color: x / Appearance: x / SG: x / pH: x  Gluc: 93 mg/dL / Ketone: x  / Bili: x / Urobili: x   Blood: x / Protein: x / Nitrite: x   Leuk Esterase: x / RBC: x / WBC x   Sq Epi: x / Non Sq Epi: x / Bacteria: x      RADIOLOGY:  < from: US Kidney and Bladder (06.29.23 @ 18:43) >  ACC: [id number] EXAM:  US KIDNEYS AND BLADDER   ORDERED BY: PAM LINO     PROCEDURE DATE:  06/29/2023          INTERPRETATION:  CLINICAL INFORMATION: Acute kidney injury.    COMPARISON: None available.    TECHNIQUE: Sonography of the kidneys and bladder. Limited study secondary   to poor acoustic windows.    FINDINGS:  Right kidney: 9.0 cm. Moderate hydronephrosis with debris in the   collecting system. No obstructing calculi visualized.    Left kidney: 8.0 cm. No hydronephrosis.    Urinary bladder: Mildly distended. Probable large right bladder   diverticulum containing layering echogenic material. Additional echogenic   debris's..    IMPRESSION:  *  Moderate right hydronephrosis.  *  Mildly distended bladder with probable large right bladder   diverticulum containing layering debris.  *  Debris in the collecting system and bladder. Correlate with urinalysis.    < end of copied text >

## 2023-07-02 NOTE — DISCHARGE NOTE PROVIDER - HOSPITAL COURSE
HPI - 77 yo F with PMhx of DMII, HTN, HLD, and known thyroid goiter presents to the ED after a fall. Patient had a mechanical fall in the bathroom. She slipped and fell in the shower when she was trying to turn the faucet. He hit her head but did not lose consciousness. Afterwards, she was able to get up by herself and was able to ambulate. However, she developed pain in the back of her head and R shoulder. It was followed by swelling and worsening pain, prompting her to come to the ED. Otherwise, she denies any fever, chills, chest pain, SOB, palpitations, dysphagia, or LE edema.   In the ED, her vitals were notable for HTN. Labs were notable for IRENE. CT head and neck shoulder no new fracture, acute bleeding or stroke. However, CT neck showed Enlarged multinodular thyroid goiter. ENT was consulted.     Hospital Course: Patient evaluated by PT and will be sent home with PT script. TSH and T4 were normal. ENT requested CT head/neck/chest w/ IV contrast with subsequent IR consult for US-guided biopsy however contrast imaging deferred for IRENE. Home HCTZ + ACEi held. Pelvic US showed moderate right hydronephrosis with a distended bladder. UA did not suggest infection. Fe-Urea calculated 38.6%. Bladder scans showed high post-void residuals requiring 2+ straight-cath with subsequent Haro placement. Started on tamsulosin HPI - 77 yo F with PMhx of DMII, HTN, HLD, and known thyroid goiter presents to the ED after a fall. Patient had a mechanical fall in the bathroom. She slipped and fell in the shower when she was trying to turn the faucet. He hit her head but did not lose consciousness. Afterwards, she was able to get up by herself and was able to ambulate. However, she developed pain in the back of her head and R shoulder. It was followed by swelling and worsening pain, prompting her to come to the ED. Otherwise, she denies any fever, chills, chest pain, SOB, palpitations, dysphagia, or LE edema.   In the ED, her vitals were notable for HTN. Labs were notable for IRENE. CT head and neck shoulder no new fracture, acute bleeding or stroke. However, CT neck showed Enlarged multinodular thyroid goiter. ENT was consulted.     Hospital Course: Patient evaluated by PT and will be sent home with PT script. TSH and T4 were normal. ENT requested CT head/neck/chest w/ IV contrast with subsequent IR consult for US-guided biopsy however contrast imaging deferred for IRENE. Unsure what baseline kidney function is. Patient was seen years ago by a nephrologist but has not followed up. Tried to get collateral from daughter. Home HCTZ + ACEi held. Pelvic US showed moderate right hydronephrosis with a distended bladder. UA did not suggest infection. Fe-Urea calculated 38.6%. Bladder scans showed high post-void residuals requiring 2+ straight-cath with subsequent Haro placement. Started on tamsulosin HPI - 79 yo F with PMhx of DMII, HTN, HLD, and known thyroid goiter presents to the ED after a fall. Patient had a mechanical fall in the bathroom. She slipped and fell in the shower when she was trying to turn the faucet. He hit her head but did not lose consciousness. Afterwards, she was able to get up by herself and was able to ambulate. However, she developed pain in the back of her head and R shoulder. It was followed by swelling and worsening pain, prompting her to come to the ED. Otherwise, she denies any fever, chills, chest pain, SOB, palpitations, dysphagia, or LE edema.   In the ED, her vitals were notable for HTN. Labs were notable for IRENE. CT head and neck shoulder no new fracture, acute bleeding or stroke. However, CT neck showed Enlarged multinodular thyroid goiter. ENT was consulted.     Hospital Course: Patient evaluated by PT and will be sent home with PT script. TSH and T4 were normal. ENT requested CT head/neck/chest w/ IV contrast with subsequent IR consult for US-guided biopsy however contrast imaging deferred for IRENE. Unsure what baseline kidney function is. Patient was seen years ago by a nephrologist but has not followed up. Tried to get collateral from daughter. Home HCTZ + ACEi held. Pelvic US showed moderate right hydronephrosis with a distended bladder. UA did not suggest infection. Fe-Urea calculated 38.6%. Bladder scans showed high post-void residuals requiring 2+ straight-cath with subsequent Haro placement. Started on tamsulosin. Plans for TOV @ [facility] upon discharged.     Pt stable and medically cleared for discharge.     OUTSTANDING ISSUES AT DISCHARGE  [ ] Haro in place, to be cared for by family  [ ] TOSUZETTE w/ [facility]  [ ] CTA to be completed outpatient for thyroid goiter  [ ] f/u w/ ENT for goiter         HPI - 79 yo F with PMhx of DMII, HTN, HLD, and known thyroid goiter presents to the ED after a fall. Patient had a mechanical fall in the bathroom. She slipped and fell in the shower when she was trying to turn the faucet. He hit her head but did not lose consciousness. Afterwards, she was able to get up by herself and was able to ambulate. However, she developed pain in the back of her head and R shoulder. It was followed by swelling and worsening pain, prompting her to come to the ED. Otherwise, she denies any fever, chills, chest pain, SOB, palpitations, dysphagia, or LE edema.   In the ED, her vitals were notable for HTN. Labs were notable for IRENE. CT head and neck shoulder no new fracture, acute bleeding or stroke. However, CT neck showed Enlarged multinodular thyroid goiter. ENT was consulted.     Hospital Course: Patient evaluated by PT and will be sent home with PT script. TSH and T4 were normal. ENT requested CT head/neck/chest w/ IV contrast with subsequent IR consult for US-guided biopsy however contrast imaging deferred for IRENE. Unsure what baseline kidney function is. Patient was seen years ago by a nephrologist but has not followed up. Tried to get collateral from daughter and PCP but unable to assess baseline SCr. Given stability at 1.4-1.5 over 120hrs - likely baseline. Home HCTZ + ACEi held during admission. Pelvic US showed moderate right hydronephrosis with a distended bladder. UA did not suggest infection. Fe-Urea calculated 38.6%. Bladder scans showed high post-void residuals requiring 2+ straight-cath with subsequent Haro placement. Started on tamsulosin. Plans for TOV @ [facility] upon discharged.    Pt stable and medically cleared for discharge.     OUTSTANDING ISSUES AT DISCHARGE  [ ] Haro in place, to be cared for by family  [ ] TOV w/ [facility]  [ ] CTA to be completed outpatient for thyroid goiter  [ ] f/u w/ ENT for goiter    Medications initiated: tamsulosin  Medications held: HCTZ, ramipril (for IRENE, though pt appears to be at baseline - advised pt and daughter to monitor BPs daily to assess for re-initiation after discussion w/ PCP)

## 2023-07-02 NOTE — DISCHARGE NOTE PROVIDER - NSFOLLOWUPCLINICS_GEN_ALL_ED_FT
[facility]  [address] [place] [address], [place] [address]  Phone: [phone]  Fax:      [facility]  [facility]  [address] [facility], [place] [address]  Phone: [phone]  Fax:     [facility]  Otolaryngology (ENT)  [address] [place] [address]  Phone: [phone]  Fax:

## 2023-07-02 NOTE — DISCHARGE NOTE PROVIDER - CARE PROVIDER_API CALL
Marielos Manzanares [facility]  Internal Medicine  [address] [place] [address]  Phone: [phone]  Fax: [phone]  Established Patient  Follow Up Time: 1 week

## 2023-07-02 NOTE — PROGRESS NOTE ADULT - SUBJECTIVE AND OBJECTIVE BOX
PROGRESS NOTE:   Authored by Dr. Tamara Bobo MD (PGY-3). Pager [facility] [phone]/ [facility] [id number]    Patient is a 78y old  Female who presents with a chief complaint of Fall, thyroid goiter (2023 10:39)      SUBJECTIVE / OVERNIGHT EVENTS:  No acute events overnight.     MEDICATIONS  (STANDING):  atorvastatin 40 milliGRAM(s) Oral at bedtime  heparin   Injectable 5000 Unit(s) SubCutaneous every 8 hours  lactated ringers. 1000 milliLiter(s) (100 mL/Hr) IV Continuous <Continuous>  tamsulosin 0.4 milliGRAM(s) Oral at bedtime    MEDICATIONS  (PRN):  aluminum hydroxide/magnesium hydroxide/simethicone Suspension 30 milliLiter(s) Oral every 4 hours PRN Dyspepsia  melatonin 3 milliGRAM(s) Oral at bedtime PRN Insomnia  ondansetron Injectable 4 milliGRAM(s) IV Push every 8 hours PRN Nausea and/or Vomiting  oxycodone    5 mG/acetaminophen 325 mG 1 Tablet(s) Oral every 6 hours PRN mild and moderate pain      CAPILLARY BLOOD GLUCOSE        I&O's Summary    2023 07:01  -  2023 07:00  --------------------------------------------------------  IN: 0 mL / OUT: 750 mL / NET: -750 mL    2023 07:01  -  2023 06:12  --------------------------------------------------------  IN: 0 mL / OUT: 1420 mL / NET: -1420 mL        PHYSICAL EXAM:  Vital Signs Last 24 Hrs  T(C): 36.6 (2023 05:30), Max: 36.8 (2023 21:45)  T(F): 97.8 (2023 05:30), Max: 98.2 (2023 21:45)  HR: 61 (2023 05:30) (61 - 80)  BP: 115/65 (2023 05:30) (115/65 - 148/68)  BP(mean): --  RR: 17 (2023 05:30) (17 - 17)  SpO2: 98% (2023 05:30) (98% - 100%)    Parameters below as of 2023 05:30  Patient On (Oxygen Delivery Method): room air        CONSTITUTIONAL: NAD  HEENT: MMM, EOMI, PERRLA  NECK: supple  RESPIRATORY: Normal respiratory effort; lungs are clear to auscultation bilaterally  CARDIOVASCULAR: Regular rate and rhythm, normal S1 and S2, no murmur/rub/gallop; No lower extremity edema  ABDOMEN: Nontender to palpation, normoactive bowel sounds, no rebound/guarding; No hepatosplenomegaly  MUSCULOSKELETAL: no clubbing or cyanosis of digits; no joint swelling or tenderness to palpation  NEURO: alert and oriented to person, place, and time. Moving all four extremities, sensation grossly intact  PSYCH: alert and oriented to person, place, and time; affect appropriate  SKIN: No rash    LABS:                        11.3   5.87  )-----------( 191      ( 2023 07:03 )             35.9     07-01    136  |  104  |  24<H>  ----------------------------<  86  5.2   |  23  |  1.47<H>    Ca    9.8      2023 07:03  Phos  3.0     07-01  Mg     2.20     07-01    TPro  8.8<H>  /  Alb  4.7  /  TBili  0.6  /  DBili  x   /  AST  29  /  ALT  9   /  AlkPhos  66  06-30          Urinalysis Basic - ( 2023 15:20 )    Color: Light Yellow / Appearance: Clear / S.015 / pH: x  Gluc: x / Ketone: Negative  / Bili: Negative / Urobili: <2 mg/dL   Blood: x / Protein: Trace / Nitrite: Negative   Leuk Esterase: Large / RBC: 2 /HPF / WBC 10 /HPF   Sq Epi: x / Non Sq Epi: x / Bacteria: Negative          Tele Reviewed:    RADIOLOGY & ADDITIONAL TESTS:  Results Reviewed:   Imaging Personally Reviewed:  Electrocardiogram Personally Reviewed:     PROGRESS NOTE:   Authored by Dr. Tamara Bobo MD (PGY-3). Pager [facility] [phone]/ [facility] [id number]    Patient is a 78y old  Female who presents with a chief complaint of Fall, thyroid goiter (2023 10:39)      SUBJECTIVE / OVERNIGHT EVENTS:  No acute events overnight.     Pt denies abdominal pain, dysuria, SOB, dysphagia. Tolerating diet    MEDICATIONS  (STANDING):  atorvastatin 40 milliGRAM(s) Oral at bedtime  heparin   Injectable 5000 Unit(s) SubCutaneous every 8 hours  lactated ringers. 1000 milliLiter(s) (100 mL/Hr) IV Continuous <Continuous>  tamsulosin 0.4 milliGRAM(s) Oral at bedtime    MEDICATIONS  (PRN):  aluminum hydroxide/magnesium hydroxide/simethicone Suspension 30 milliLiter(s) Oral every 4 hours PRN Dyspepsia  melatonin 3 milliGRAM(s) Oral at bedtime PRN Insomnia  ondansetron Injectable 4 milliGRAM(s) IV Push every 8 hours PRN Nausea and/or Vomiting  oxycodone    5 mG/acetaminophen 325 mG 1 Tablet(s) Oral every 6 hours PRN mild and moderate pain      CAPILLARY BLOOD GLUCOSE        I&O's Summary    2023 07:01  -  2023 07:00  --------------------------------------------------------  IN: 0 mL / OUT: 750 mL / NET: -750 mL    2023 07:01  -  2023 06:12  --------------------------------------------------------  IN: 0 mL / OUT: 1420 mL / NET: -1420 mL        PHYSICAL EXAM:  Vital Signs Last 24 Hrs  T(C): 36.6 (2023 05:30), Max: 36.8 (2023 21:45)  T(F): 97.8 (2023 05:30), Max: 98.2 (2023 21:45)  HR: 61 (2023 05:30) (61 - 80)  BP: 115/65 (2023 05:30) (115/65 - 148/68)  BP(mean): --  RR: 17 (2023 05:30) (17 - 17)  SpO2: 98% (2023 05:30) (98% - 100%)    Parameters below as of 2023 05:30  Patient On (Oxygen Delivery Method): room air        CONSTITUTIONAL: NAD  HEENT: MMM, EOMI, PERRLA  NECK: supple  RESPIRATORY: Normal respiratory effort; lungs are clear to auscultation bilaterally  CARDIOVASCULAR: Regular rate and rhythm, normal S1 and S2, no murmur/rub/gallop; No lower extremity edema  ABDOMEN: RLQ mass, nontender to palpation, normoactive bowel sounds, no rebound/guarding; No hepatosplenomegaly  MUSCULOSKELETAL: no clubbing or cyanosis of digits; no joint swelling or tenderness to palpation  NEURO: alert and oriented to person, place, and time. Moving all four extremities, sensation grossly intact  PSYCH: alert and oriented to person, place, and time; affect appropriate  SKIN: No rash    LABS:                        11.3   5.87  )-----------( 191      ( 2023 07:03 )             35.9     07-01    136  |  104  |  24<H>  ----------------------------<  86  5.2   |  23  |  1.47<H>    Ca    9.8      2023 07:03  Phos  3.0     07-01  Mg     2.20     07-01    TPro  8.8<H>  /  Alb  4.7  /  TBili  0.6  /  DBili  x   /  AST  29  /  ALT  9   /  AlkPhos  66  06-30          Urinalysis Basic - ( 2023 15:20 )    Color: Light Yellow / Appearance: Clear / S.015 / pH: x  Gluc: x / Ketone: Negative  / Bili: Negative / Urobili: <2 mg/dL   Blood: x / Protein: Trace / Nitrite: Negative   Leuk Esterase: Large / RBC: 2 /HPF / WBC 10 /HPF   Sq Epi: x / Non Sq Epi: x / Bacteria: Negative          Tele Reviewed:    RADIOLOGY & ADDITIONAL TESTS:  Results Reviewed:   Imaging Personally Reviewed:  Electrocardiogram Personally Reviewed:

## 2023-07-02 NOTE — PROGRESS NOTE ADULT - SUBJECTIVE AND OBJECTIVE BOX
Brief ORL Note    Case discussed with primary medicine team. Patient still with IRENE and high risk for CT with contrast. Given chronic thyroid goiter, workup can be completed as an outpatient.    - please complete outpatient CT neck and chest with IV contrast when patient medically optimized  - reconsult ENT as needed

## 2023-07-02 NOTE — PROGRESS NOTE ADULT - PROBLEM SELECTOR PLAN 3
Scr elevated to 1.54. Patient follows Dr. Marcie Manzanares outpatient and has seen a nephrologist (?Dr. Clement) for a ?mass previously. Will try to obtain collateral from nephrologist and Dr. 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 Patient aware will need Haro  - UA obtained Scr elevated to 1.54. Patient follows Dr. Marcie Manzanares outpatient and has seen a nephrologist (?Dr. Clement) for a ?mass previously. Will try to obtain collateral from nephrologist and Dr. Manzanares  -Unknown baseline   - 06/29 US Pelvis showed moderate right hydronephrosis, mildly distended bladder with probable large right bladder diverticulum containing layering debris  -ayala catheter placed 7/1 for urinary retention  -urology consulted for urinary retention  -UA negative  -start tamsulosin 0.4 mg at bedtime

## 2023-07-02 NOTE — DISCHARGE NOTE PROVIDER - NSDCFUSCHEDAPPT_GEN_ALL_CORE_FT
[facility]  INTMED OP [id number]  Scheduled Appointment: 07/11/2023     [facility]  UROLOGY [address] [facility]  Scheduled Appointment: 07/14/2023    Helena Crowder  [facility]  CARDIOLOGY OP [id number]  Scheduled Appointment: 08/18/2023    [facility]  INTMED OP [id number]  Scheduled Appointment: 08/18/2023

## 2023-07-02 NOTE — PROGRESS NOTE ADULT - PROBLEM SELECTOR PLAN 2
CT cervical spine showed thyroid goiter   -Pt has known hx of thyroid goiter. She underwent biopsy in [place] about 2-3 years ago and was told that it is not cancerous. Advised patient to obtain those reports.   -Denies any choking sensation or SOB   -TSH and T4 are WNL   -ENT recs appreciated   -Plan to obtain CT neck and chest with IV contrast once Scr improves   -IR consult pending CT neck and chest result  - Plan to get biopsy outpatient CT cervical spine showed thyroid goiter. Pt denies enlarging goiter, SOB, dysphagia  -Pt has known hx of thyroid goiter. She underwent biopsy in [place] about 2-3 years ago and was told that it is not cancerous. Advised patient to obtain those reports.   -Denies any choking sensation or SOB   -TSH and T4 are WNL   -ENT recs appreciated   -Plan to obtain CT neck and chest with IV contrast as outpt given IRENE  -outpt f/u with IR for biopsy.   -discussed with patient and her duaghter. They are in agreement with outpatient work-up

## 2023-07-02 NOTE — DISCHARGE NOTE PROVIDER - NSDCCAREPROVSEEN_GEN_ALL_CORE_FT
[facility] Medicine Team 2 [facility] Medicine Team 2  Farrah Burton

## 2023-07-02 NOTE — DISCHARGE NOTE PROVIDER - NSDCCPTREATMENT_GEN_ALL_CORE_FT
PRINCIPAL PROCEDURE  Procedure: Ultrasound, kidney and bladder  Findings and Treatment: IMPRESSION:  *  Moderate right hydronephrosis.  *  Mildly distended bladder with probable large right bladder   diverticulum containing layering debris.  *  Debris in the collecting system and bladder. Correlate with urinalysis.        SECONDARY PROCEDURE  Procedure: CT cervical spine wo con  Findings and Treatment: IMPRESSION:  Cervical spine CT: No acute fractures or dislocations.  Multilevel cervical spondylosis.  Enlarged multinodular thyroid goiter which is only partially imaged.   Retrosternal/mediastinal extension is not excluded. Recommend ultrasound   correlation.  Head CT: No acute intracranial hemorrhage, mass effect, or shift of the   midline structures.  Mild chronic white matter microvascular type changes.      
no blood in stool

## 2023-07-03 LAB
ALBUMIN SERPL ELPH-MCNC: 4.3 G/DL — SIGNIFICANT CHANGE UP (ref 3.3–5)
ALP SERPL-CCNC: 64 U/L — SIGNIFICANT CHANGE UP (ref 40–120)
ALT FLD-CCNC: 17 U/L — SIGNIFICANT CHANGE UP (ref 4–33)
ANION GAP SERPL CALC-SCNC: 22 MMOL/L — HIGH (ref 7–14)
AST SERPL-CCNC: 27 U/L — SIGNIFICANT CHANGE UP (ref 4–32)
BILIRUB SERPL-MCNC: 0.4 MG/DL — SIGNIFICANT CHANGE UP (ref 0.2–1.2)
BUN SERPL-MCNC: 26 MG/DL — HIGH (ref 7–23)
CALCIUM SERPL-MCNC: 9.3 MG/DL — SIGNIFICANT CHANGE UP (ref 8.4–10.5)
CHLORIDE SERPL-SCNC: 97 MMOL/L — LOW (ref 98–107)
CO2 SERPL-SCNC: 20 MMOL/L — LOW (ref 22–31)
CREAT SERPL-MCNC: 1.5 MG/DL — HIGH (ref 0.5–1.3)
EGFR: 35 ML/MIN/1.73M2 — LOW
GLUCOSE SERPL-MCNC: 123 MG/DL — HIGH (ref 70–99)
HCT VFR BLD CALC: 35.2 % — SIGNIFICANT CHANGE UP (ref 34.5–45)
HGB BLD-MCNC: 11.4 G/DL — LOW (ref 11.5–15.5)
MAGNESIUM SERPL-MCNC: 2.2 MG/DL — SIGNIFICANT CHANGE UP (ref 1.6–2.6)
MCHC RBC-ENTMCNC: 31.3 PG — SIGNIFICANT CHANGE UP (ref 27–34)
MCHC RBC-ENTMCNC: 32.4 GM/DL — SIGNIFICANT CHANGE UP (ref 32–36)
MCV RBC AUTO: 96.7 FL — SIGNIFICANT CHANGE UP (ref 80–100)
NRBC # BLD: 0 /100 WBCS — SIGNIFICANT CHANGE UP (ref 0–0)
NRBC # FLD: 0 K/UL — SIGNIFICANT CHANGE UP (ref 0–0)
PHOSPHATE SERPL-MCNC: 3.3 MG/DL — SIGNIFICANT CHANGE UP (ref 2.5–4.5)
PLATELET # BLD AUTO: 203 K/UL — SIGNIFICANT CHANGE UP (ref 150–400)
POTASSIUM SERPL-MCNC: 4.4 MMOL/L — SIGNIFICANT CHANGE UP (ref 3.5–5.3)
POTASSIUM SERPL-SCNC: 4.4 MMOL/L — SIGNIFICANT CHANGE UP (ref 3.5–5.3)
PROT SERPL-MCNC: 8.5 G/DL — HIGH (ref 6–8.3)
RBC # BLD: 3.64 M/UL — LOW (ref 3.8–5.2)
RBC # FLD: 12.3 % — SIGNIFICANT CHANGE UP (ref 10.3–14.5)
SODIUM SERPL-SCNC: 139 MMOL/L — SIGNIFICANT CHANGE UP (ref 135–145)
WBC # BLD: 6.34 K/UL — SIGNIFICANT CHANGE UP (ref 3.8–10.5)
WBC # FLD AUTO: 6.34 K/UL — SIGNIFICANT CHANGE UP (ref 3.8–10.5)

## 2023-07-03 PROCEDURE — 99232 SBSQ HOSP IP/OBS MODERATE 35: CPT

## 2023-07-03 RX ADMIN — HEPARIN SODIUM 5000 UNIT(S): 5000 INJECTION INTRAVENOUS; SUBCUTANEOUS at 13:15

## 2023-07-03 RX ADMIN — HEPARIN SODIUM 5000 UNIT(S): 5000 INJECTION INTRAVENOUS; SUBCUTANEOUS at 22:08

## 2023-07-03 RX ADMIN — ATORVASTATIN CALCIUM 40 MILLIGRAM(S): 80 TABLET, FILM COATED ORAL at 22:08

## 2023-07-03 RX ADMIN — TAMSULOSIN HYDROCHLORIDE 0.4 MILLIGRAM(S): 0.4 CAPSULE ORAL at 22:08

## 2023-07-03 RX ADMIN — HEPARIN SODIUM 5000 UNIT(S): 5000 INJECTION INTRAVENOUS; SUBCUTANEOUS at 05:18

## 2023-07-03 NOTE — PROGRESS NOTE ADULT - PROBLEM SELECTOR PLAN 2
CT cervical spine showed thyroid goiter. Pt denies enlarging goiter, SOB, dysphagia  -Pt has known hx of thyroid goiter. She underwent biopsy in [place] about 2-3 years ago and was told that it is not cancerous. Advised patient to obtain those reports.   -Denies any choking sensation or SOB   -TSH and T4 are WNL   -ENT recs appreciated   -Plan to obtain CT neck and chest with IV contrast as outpt given IRENE  -outpt f/u with IR for biopsy.   -discussed with patient and her duaghter. They are in agreement with outpatient work-up CT cervical spine showed thyroid goiter. Pt denies enlarging goiter, SOB, dysphagia  -Pt has known hx of thyroid goiter. She underwent biopsy in [place] about 2-3 years ago and was told that it is not cancerous. Advised patient to obtain those reports.   -Denies any choking sensation or SOB   -TSH and T4 are WNL   -ENT recs appreciated   -Plan to obtain CT neck and chest with IV contrast as outpt given IRENE  -outpt f/u with IR for biopsy.   -discussed with patient and her daughter. They are in agreement with outpatient work-up

## 2023-07-03 NOTE — PROGRESS NOTE ADULT - PROBLEM SELECTOR PLAN 3
Scr elevated to 1.54. Patient follows Dr. Marcie Manzanares outpatient and has seen a nephrologist (?Dr. Clement) for a ?mass previously. Will try to obtain collateral from nephrologist and Dr. Manzanares  -Unknown baseline   - 06/29 US Pelvis showed moderate right hydronephrosis, mildly distended bladder with probable large right bladder diverticulum containing layering debris  -ayala catheter placed 7/1 for urinary retention  -urology consulted for urinary retention  -UA negative  -start tamsulosin 0.4 mg at bedtime Scr elevated to 1.54. Patient follows Dr. Marcie Manzanares outpatient and has seen a nephrologist (?Dr. Clement) for a ?mass previously. Will try to obtain collateral from nephrologist and Dr. Manzanares  -Unknown baseline: attempted multiple times to contact PCP to obtain baseline  - 06/29 US Pelvis showed moderate right hydronephrosis, mildly distended bladder with probable large right bladder diverticulum containing layering debris  - 7/2 US demonstrated unchanged R hydronephrosis  -urology consulted for urinary retention  -ayala catheter placed 7/1 for urinary retention. May be discharged with Ayala per urology  -UA negative  -start tamsulosin 0.4 mg at bedtime  - F/u nephrology and urology outpatient

## 2023-07-03 NOTE — PROGRESS NOTE ADULT - PROBLEM SELECTOR PLAN 1
Mechanical fall at home   -Exam shows contusion to posterior scalp  -CT head and neck showed no acute injury   -Supportive measures   -PT rec outpt PT  -Fall precautions

## 2023-07-03 NOTE — PROGRESS NOTE ADULT - SUBJECTIVE AND OBJECTIVE BOX
Patient is a 78y old  Female who presents with a chief complaint of Fall, thyroid goiter (02 Jul 2023 17:49)      INTERVAL HX:  -       Allergies:  No Known Allergies      Medications:  aluminum hydroxide/magnesium hydroxide/simethicone Suspension 30 milliLiter(s) Oral every 4 hours PRN  atorvastatin 40 milliGRAM(s) Oral at bedtime  heparin   Injectable 5000 Unit(s) SubCutaneous every 8 hours  lactated ringers. 1000 milliLiter(s) IV Continuous <Continuous>  melatonin 3 milliGRAM(s) Oral at bedtime PRN  ondansetron Injectable 4 milliGRAM(s) IV Push every 8 hours PRN  oxycodone    5 mG/acetaminophen 325 mG 1 Tablet(s) Oral every 6 hours PRN  tamsulosin 0.4 milliGRAM(s) Oral at bedtime      Vitals:  T(C): 36.4 (07-03-23 @ 05:28), Max: 36.8 (07-02-23 @ 21:35)  HR: 80 (07-03-23 @ 05:28) (80 - 95)  BP: 148/68 (07-03-23 @ 05:28) (137/75 - 148/68)  RR: 16 (07-03-23 @ 05:28) (16 - 18)  SpO2: 96% (07-03-23 @ 05:28) (95% - 98%)    I/O's:    07-02-23 @ 07:01  -  07-03-23 @ 07:00  --------------------------------------------------------  IN: 0 mL / OUT: 1300 mL / NET: -1300 mL        Physical Exam:  CONSTITUTIONAL: Well-appearing, no apparent distress.  SKIN: No rashes or ecchymosis.  EYES: PERRLA. EOMI. No scleral icterus.  ENT: No JVD. Supple, no thyromegaly. No discharge or glossitis. Oral mucosa with moist membranes. Hearing intact bilaterally.  LYMPH: No lymphadenopathy.  CV: RRR. Normal S1 and S2. No murmurs, rubs, or gallops. No edema. Pulses equal bilaterally.  PULM: Clear to auscultation throughout. Respirations unlabored. No wheezing, rales, or rhonchi.  GI: Soft, non-tender. No palpable masses. No hematosplenomegaly. No abnormal bowel sounds.  MSK: Normal gait. No cyanosis or clubbing. Normal ROM. No spinal tenderness.  NEURO: CN II-XII intact. Strength 5/5 throughout. Sensation normal throughout. Reflexes +2 throughout.  PSYCH: A+O x3. Appropriate insight/judgment. Mood and affect appropriate. Recent/remote memory intact.    Labs:                        11.1   5.85  )-----------( 203      ( 02 Jul 2023 06:20 )             34.3     07-02    137  |  103  |  25<H>  ----------------------------<  93  4.5   |  21<L>  |  1.43<H>    Ca    9.7      02 Jul 2023 06:20  Phos  2.9     07-02  Mg     2.20     07-02    TPro  8.1  /  Alb  4.1  /  TBili  0.4  /  DBili  x   /  AST  28  /  ALT  13  /  AlkPhos  62  07-02      Radiology/Procedures:   Patient is a 78y old  Female who presents with a chief complaint of Fall, thyroid goiter (02 Jul 2023 17:49)      INTERVAL HX:  - NAEO      Allergies:  No Known Allergies      Medications:  aluminum hydroxide/magnesium hydroxide/simethicone Suspension 30 milliLiter(s) Oral every 4 hours PRN  atorvastatin 40 milliGRAM(s) Oral at bedtime  heparin   Injectable 5000 Unit(s) SubCutaneous every 8 hours  lactated ringers. 1000 milliLiter(s) IV Continuous <Continuous>  melatonin 3 milliGRAM(s) Oral at bedtime PRN  ondansetron Injectable 4 milliGRAM(s) IV Push every 8 hours PRN  oxycodone    5 mG/acetaminophen 325 mG 1 Tablet(s) Oral every 6 hours PRN  tamsulosin 0.4 milliGRAM(s) Oral at bedtime      Vitals:  T(C): 36.4 (07-03-23 @ 05:28), Max: 36.8 (07-02-23 @ 21:35)  HR: 80 (07-03-23 @ 05:28) (80 - 95)  BP: 148/68 (07-03-23 @ 05:28) (137/75 - 148/68)  RR: 16 (07-03-23 @ 05:28) (16 - 18)  SpO2: 96% (07-03-23 @ 05:28) (95% - 98%)    I/O's:    07-02-23 @ 07:01  -  07-03-23 @ 07:00  --------------------------------------------------------  IN: 0 mL / OUT: 1300 mL / NET: -1300 mL        Physical Exam:  CONSTITUTIONAL: Well-appearing, no apparent distress.  SKIN: No rashes or ecchymosis.  EYES: PERRLA. EOMI. No scleral icterus.  ENT: No JVD. Supple, no thyromegaly. No discharge or glossitis. Oral mucosa with moist membranes. Hearing intact bilaterally.  LYMPH: No lymphadenopathy.  CV: RRR. Normal S1 and S2. No murmurs, rubs, or gallops. No edema. Pulses equal bilaterally.  PULM: Clear to auscultation throughout. Respirations unlabored. No wheezing, rales, or rhonchi.  GI: Soft, non-tender. No palpable masses.  MSK: Normal gait. No cyanosis or clubbing. Normal ROM.   NEURO: CN II-XII intact.   PSYCH: A+O x3.     Labs:                        11.1   5.85  )-----------( 203      ( 02 Jul 2023 06:20 )             34.3     07-02    137  |  103  |  25<H>  ----------------------------<  93  4.5   |  21<L>  |  1.43<H>    Ca    9.7      02 Jul 2023 06:20  Phos  2.9     07-02  Mg     2.20     07-02    TPro  8.1  /  Alb  4.1  /  TBili  0.4  /  DBili  x   /  AST  28  /  ALT  13  /  AlkPhos  62  07-02      Radiology/Procedures:   US Kidney & Bladder 07.02.2023  IMPRESSION:  *  Moderate right hydronephrosis, unchanged  *  12 cm cystic lesion in the pelvis with layering and specular debris,   suspicious for bladder diverticulum. Pelvic mass is considered less   likely. Consider further evaluation with CT scan.

## 2023-07-03 NOTE — PROGRESS NOTE ADULT - PROBLEM SELECTOR PLAN 4
Hold HCTZ and ramipril due to IRENE    - Monitor BP Hold HCTZ and ramipril due to IRENE  - Monitor BP

## 2023-07-04 ENCOUNTER — TRANSCRIPTION ENCOUNTER (OUTPATIENT)
Age: 78
End: 2023-07-04

## 2023-07-04 VITALS
SYSTOLIC BLOOD PRESSURE: 125 MMHG | HEART RATE: 93 BPM | RESPIRATION RATE: 17 BRPM | TEMPERATURE: 98 F | DIASTOLIC BLOOD PRESSURE: 69 MMHG | OXYGEN SATURATION: 98 %

## 2023-07-04 LAB
ANION GAP SERPL CALC-SCNC: 13 MMOL/L — SIGNIFICANT CHANGE UP (ref 7–14)
BUN SERPL-MCNC: 22 MG/DL — SIGNIFICANT CHANGE UP (ref 7–23)
CALCIUM SERPL-MCNC: 9.6 MG/DL — SIGNIFICANT CHANGE UP (ref 8.4–10.5)
CHLORIDE SERPL-SCNC: 100 MMOL/L — SIGNIFICANT CHANGE UP (ref 98–107)
CO2 SERPL-SCNC: 21 MMOL/L — LOW (ref 22–31)
CREAT SERPL-MCNC: 1.4 MG/DL — HIGH (ref 0.5–1.3)
EGFR: 38 ML/MIN/1.73M2 — LOW
GLUCOSE SERPL-MCNC: 102 MG/DL — HIGH (ref 70–99)
MAGNESIUM SERPL-MCNC: 2.1 MG/DL — SIGNIFICANT CHANGE UP (ref 1.6–2.6)
PHOSPHATE SERPL-MCNC: 3.3 MG/DL — SIGNIFICANT CHANGE UP (ref 2.5–4.5)
POTASSIUM SERPL-MCNC: 4.7 MMOL/L — SIGNIFICANT CHANGE UP (ref 3.5–5.3)
POTASSIUM SERPL-SCNC: 4.7 MMOL/L — SIGNIFICANT CHANGE UP (ref 3.5–5.3)
SODIUM SERPL-SCNC: 134 MMOL/L — LOW (ref 135–145)

## 2023-07-04 PROCEDURE — 99239 HOSP IP/OBS DSCHRG MGMT >30: CPT | Mod: GC

## 2023-07-04 RX ORDER — HYDROCHLOROTHIAZIDE 25 MG
1 TABLET ORAL
Refills: 0 | DISCHARGE

## 2023-07-04 RX ORDER — TAMSULOSIN HYDROCHLORIDE 0.4 MG/1
1 CAPSULE ORAL
Qty: 30 | Refills: 0
Start: 2023-07-04 | End: 2023-08-02

## 2023-07-04 RX ORDER — RAMIPRIL 5 MG
1 CAPSULE ORAL
Refills: 0 | DISCHARGE

## 2023-07-04 RX ADMIN — HEPARIN SODIUM 5000 UNIT(S): 5000 INJECTION INTRAVENOUS; SUBCUTANEOUS at 05:28

## 2023-07-04 RX ADMIN — HEPARIN SODIUM 5000 UNIT(S): 5000 INJECTION INTRAVENOUS; SUBCUTANEOUS at 14:25

## 2023-07-04 NOTE — PROGRESS NOTE ADULT - PROBLEM SELECTOR PLAN 2
CT cervical spine showed thyroid goiter. Pt denies enlarging goiter, SOB, dysphagia  -Pt has known hx of thyroid goiter. She underwent biopsy in [place] about 2-3 years ago and was told that it is not cancerous. Advised patient to obtain those reports.   -Denies any choking sensation or SOB   -TSH and T4 are WNL   -ENT recs appreciated   -Plan to obtain CT neck and chest with IV contrast as outpt given IRENE  -outpt f/u with IR for biopsy.   -discussed with patient and her daughter. They are in agreement with outpatient work-up

## 2023-07-04 NOTE — PROGRESS NOTE ADULT - PROBLEM SELECTOR PLAN 3
Scr elevated to 1.54. Patient follows Dr. Marcie Manzanares outpatient and has seen a nephrologist (?Dr. Clement) for a ?mass previously. Will try to obtain collateral from nephrologist and Dr. Manzanares  -Unknown baseline: attempted multiple times to contact PCP to obtain baseline. Patient has consistently been in 1.4-1.5 this admission, which may be her baseline.  - 06/29 US Pelvis showed moderate right hydronephrosis, mildly distended bladder with probable large right bladder diverticulum containing layering debris  - 7/2 US demonstrated unchanged R hydronephrosis  -urology consulted for urinary retention  -ayala catheter placed 7/1 for urinary retention. May be discharged with Ayala per urology  - UA negative  - Tamsulosin 0.4 mg at bedtime  - F/u nephrology and urology outpatient

## 2023-07-04 NOTE — PROGRESS NOTE ADULT - PROBLEM SELECTOR PROBLEM 3
IRENE (acute kidney injury)

## 2023-07-04 NOTE — PROGRESS NOTE ADULT - ASSESSMENT
77 yo F with PMhx of DMII, HTN, HLD, and known thyroid goiter presents to the ED after a mechanical fall, found to have thyroid goiter and found to have an IRENE w/ hydronephrosis.
79 yo F with PMhx of DMII, HTN, HLD, and known thyroid goiter presents to the ED after a mechanical fall, found to have thyroid goiter and found to have an IRENE w/ hydronephrosis.
77 yo F with PMhx of DMII, HTN, HLD, and known thyroid goiter presents to the ED after a mechanical fall, found to have thyroid goiter.

## 2023-07-04 NOTE — DISCHARGE NOTE NURSING/CASE MANAGEMENT/SOCIAL WORK - NSDCFUADDAPPT_GEN_ALL_CORE_FT
Please call [phone] and set an appointment with urology for trial of void/ayala removal assessment. Also, set an appointment with Dr. Manzanares to complete CT head and neck so that you may then follow up with ENT regarding your thyroid goiter.     APPTS ARE READY TO BE MADE: [X] YES    Best Family or Patient Contact (if needed):    Additional Information about above appointments (if needed):    1:   2:   3:     Other comments or requests:

## 2023-07-04 NOTE — PROGRESS NOTE ADULT - SUBJECTIVE AND OBJECTIVE BOX
Patient is a 78y old  Female who presents with a chief complaint of Fall, thyroid goiter (03 Jul 2023 07:56)      INTERVAL HX:  - NAEO      Allergies:  No Known Allergies      Medications:  aluminum hydroxide/magnesium hydroxide/simethicone Suspension 30 milliLiter(s) Oral every 4 hours PRN  atorvastatin 40 milliGRAM(s) Oral at bedtime  heparin   Injectable 5000 Unit(s) SubCutaneous every 8 hours  lactated ringers. 1000 milliLiter(s) IV Continuous <Continuous>  melatonin 3 milliGRAM(s) Oral at bedtime PRN  ondansetron Injectable 4 milliGRAM(s) IV Push every 8 hours PRN  oxycodone    5 mG/acetaminophen 325 mG 1 Tablet(s) Oral every 6 hours PRN  tamsulosin 0.4 milliGRAM(s) Oral at bedtime      Vitals:  T(C): 36.6 (07-04-23 @ 05:22), Max: 37.1 (07-03-23 @ 21:02)  HR: 90 (07-04-23 @ 05:22) (86 - 90)  BP: 119/62 (07-04-23 @ 05:22) (119/62 - 145/67)  RR: 16 (07-04-23 @ 05:22) (16 - 18)  SpO2: 97% (07-04-23 @ 05:22) (97% - 99%)    I/O's:    07-03-23 @ 07:01  -  07-04-23 @ 07:00  --------------------------------------------------------  IN: 0 mL / OUT: 1650 mL / NET: -1650 mL        Physical Exam:  CONSTITUTIONAL: Well-appearing, no apparent distress.  SKIN: No rashes or ecchymosis.  EYES: PERRLA. EOMI. No scleral icterus.  ENT: No JVD. Supple, no thyromegaly. No discharge or glossitis. Oral mucosa with moist membranes. Hearing intact bilaterally.  LYMPH: No lymphadenopathy.  CV: RRR. Normal S1 and S2. No murmurs, rubs, or gallops. No edema. Pulses equal bilaterally.  PULM: Clear to auscultation throughout. Respirations unlabored. No wheezing, rales, or rhonchi.  GI: Soft, non-tender. No palpable masses.  MSK: Normal gait. No cyanosis or clubbing. Normal ROM.   NEURO: CN II-XII intact.   PSYCH: A+O x3.     Labs:                        11.4   6.34  )-----------( 203      ( 03 Jul 2023 07:06 )             35.2     07-04    134<L>  |  100  |  22  ----------------------------<  102<H>  4.7   |  21<L>  |  1.40<H>    Ca    9.6      04 Jul 2023 06:45  Phos  3.3     07-04  Mg     2.10     07-04    TPro  8.5<H>  /  Alb  4.3  /  TBili  0.4  /  DBili  x   /  AST  27  /  ALT  17  /  AlkPhos  64  07-03      Radiology/Procedures:  US Kidney & Bladder 07.02.2023  IMPRESSION:  *  Moderate right hydronephrosis, unchanged  *  12 cm cystic lesion in the pelvis with layering and specular debris,   suspicious for bladder diverticulum. Pelvic mass is considered less   likely. Consider further evaluation with CT scan.

## 2023-07-04 NOTE — DISCHARGE NOTE NURSING/CASE MANAGEMENT/SOCIAL WORK - PATIENT PORTAL LINK FT
You can access the FollowMyHealth Patient Portal offered by [facility] by registering at the following website: [email or web address]. By joining [facility]’s FollowMyHealth portal, you will also be able to view your health information using other applications (apps) compatible with our system.

## 2023-07-04 NOTE — PROGRESS NOTE ADULT - PROBLEM SELECTOR PROBLEM 2
Retrosternal thyroid goiter

## 2023-07-04 NOTE — PROGRESS NOTE ADULT - REASON FOR ADMISSION
----- Message from Nicole Kirby MD sent at 2/18/2022  1:06 PM CST -----  Overall normal labs - cholesterol is slightly elevated. Eat low fat diet and exercise   
Fall, thyroid goiter
Fall, thyroid goiter
Patient informed of results and dr directives. Verbalized understanding    
Fall, thyroid goiter

## 2023-07-04 NOTE — DISCHARGE NOTE NURSING/CASE MANAGEMENT/SOCIAL WORK - NSDCPEFALRISK_GEN_ALL_CORE
For information on Fall & Injury Prevention, visit: [email or web address] OR  [email or web address] OR  https://www.cdc.gov/steadi/patient.html

## 2023-07-05 PROBLEM — E04.9 NONTOXIC GOITER, UNSPECIFIED: Chronic | Status: ACTIVE | Noted: 2023-06-29

## 2023-07-05 PROBLEM — E78.5 HYPERLIPIDEMIA, UNSPECIFIED: Chronic | Status: ACTIVE | Noted: 2023-06-29

## 2023-07-05 PROBLEM — I10 ESSENTIAL (PRIMARY) HYPERTENSION: Chronic | Status: ACTIVE | Noted: 2023-06-29

## 2023-07-11 ENCOUNTER — NON-APPOINTMENT (OUTPATIENT)
Age: 78
End: 2023-07-11

## 2023-07-11 ENCOUNTER — OUTPATIENT (OUTPATIENT)
Dept: OUTPATIENT SERVICES | Facility: HOSPITAL | Age: 78
LOS: 1 days | End: 2023-07-11

## 2023-07-11 ENCOUNTER — APPOINTMENT (OUTPATIENT)
Dept: INTERNAL MEDICINE | Facility: CLINIC | Age: 78
End: 2023-07-11
Payer: COMMERCIAL

## 2023-07-11 VITALS
HEART RATE: 83 BPM | BODY MASS INDEX: 25.11 KG/M2 | RESPIRATION RATE: 16 BRPM | TEMPERATURE: 97.3 F | DIASTOLIC BLOOD PRESSURE: 82 MMHG | HEIGHT: 61 IN | SYSTOLIC BLOOD PRESSURE: 150 MMHG | OXYGEN SATURATION: 100 % | WEIGHT: 133 LBS

## 2023-07-11 DIAGNOSIS — Z90.710 ACQUIRED ABSENCE OF BOTH CERVIX AND UTERUS: Chronic | ICD-10-CM

## 2023-07-11 DIAGNOSIS — Z83.49 FAMILY HISTORY OF OTHER ENDOCRINE, NUTRITIONAL AND METABOLIC DISEASES: ICD-10-CM

## 2023-07-11 DIAGNOSIS — Z00.00 ENCOUNTER FOR GENERAL ADULT MEDICAL EXAMINATION W/OUT ABNORMAL FINDINGS: ICD-10-CM

## 2023-07-11 DIAGNOSIS — Z98.890 OTHER SPECIFIED POSTPROCEDURAL STATES: Chronic | ICD-10-CM

## 2023-07-11 DIAGNOSIS — R22.1 LOCALIZED SWELLING, MASS AND LUMP, NECK: ICD-10-CM

## 2023-07-11 PROCEDURE — ZZZZZ: CPT | Mod: GC

## 2023-07-11 RX ORDER — ATORVASTATIN CALCIUM 20 MG/1
20 TABLET, FILM COATED ORAL DAILY
Qty: 1 | Refills: 1 | Status: COMPLETED | COMMUNITY
Start: 2023-07-11

## 2023-07-12 DIAGNOSIS — E78.5 HYPERLIPIDEMIA, UNSPECIFIED: ICD-10-CM

## 2023-07-12 DIAGNOSIS — E11.9 TYPE 2 DIABETES MELLITUS WITHOUT COMPLICATIONS: ICD-10-CM

## 2023-07-12 DIAGNOSIS — R22.1 LOCALIZED SWELLING, MASS AND LUMP, NECK: ICD-10-CM

## 2023-07-12 DIAGNOSIS — R33.9 RETENTION OF URINE, UNSPECIFIED: ICD-10-CM

## 2023-07-12 DIAGNOSIS — R01.1 CARDIAC MURMUR, UNSPECIFIED: ICD-10-CM

## 2023-07-12 DIAGNOSIS — I10 ESSENTIAL (PRIMARY) HYPERTENSION: ICD-10-CM

## 2023-07-12 DIAGNOSIS — Z00.00 ENCOUNTER FOR GENERAL ADULT MEDICAL EXAMINATION WITHOUT ABNORMAL FINDINGS: ICD-10-CM

## 2023-07-12 NOTE — ASSESSMENT
[FreeTextEntry1] : 79 y/o F with h/o DM, multinodular goiter, HL, HTN with recent admission 6/29-7/4 for mechanical fall complicated by urinary retention requiring ayala catheter placement here for post-hospitalization follow up and to establish care.\par \par #Post-hospitalization visit for traumatic fall\par - patient is improving, has some residual pain in R shoulder and knee\par - Lidocaine patch PRN\par - PT referral placed\par \par #Urinary retention\par - c/w flomax\par - f/u with urology for removal of ayala\par \par CKD - possibly chronic; Cr baseline 1.4-1.5 in the hospital\par - Will continue to hold HCTZ and ACEI for now\par - COnsider re-starting ACEI on\par \par #HTN\par - BPs have been good, continue to monitor at home, no need to restart anti-HTN meds, will recheck at next appointment\par \par #Systolic Murmur\par - Possible aortic stenosis, will f/u with ECHO\par \par #Type 2 DM - not on any meds. A1c 4.9. Blood sugars self reported in 90s/100s\par - Continue to monitor.\par \par #Goiter\par - Patient had FNA in 2021 showing benign growth, currently asymptomatic, no intervention at this time.\par - Will need Endo follow up.\par \par #HL - lipid profile abnormal with high LDL in the hospital; previously on simvastatin 40 with good adherence\par - Will switch to rosuvastatin 5mg and titrate up\par \par #Health maintenance\par - Will discuss vaccines again at next appointment: discussed Prevnar 20, Shingles, and Tdap.\par - Reports had mammogram which was normal\par - Colonoscopy approx 2015 per patient - reportedly normal\par - Unsure whether had DEXA - will discuss at next visit.\par - F/u in 5 weeks for vaccinations, f/u on recovery from fall, and f/u after removal of ayala\par \par Plan discussed with Dr. Jacob\par \par \par

## 2023-07-12 NOTE — HEALTH RISK ASSESSMENT
[Very Good] : ~his/her~  mood as very good [1 or 2 (0 pts)] : 1 or 2 (0 points) [Never (0 pts)] : Never (0 points) [No] : In the past 12 months have you used drugs other than those required for medical reasons? No [Any fall with injury in past year] : Patient reported fall with injury in the past year [Other reason not done] : Other reason not done [0] : 0 [None] : None [With Family] : lives with family [Fully functional (bathing, dressing, toileting, transferring, walking, feeding)] : Fully functional (bathing, dressing, toileting, transferring, walking, feeding) [Fully functional (using the telephone, shopping, preparing meals, housekeeping, doing laundry, using] : Fully functional and needs no help or supervision to perform IADLs (using the telephone, shopping, preparing meals, housekeeping, doing laundry, using transportation, managing medications and managing finances) [Never] : Never [0-4] : 0-4 [Change in mental status noted] : No change in mental status noted [Language] : denies difficulty with language [Behavior] : denies difficulty with behavior [Learning/Retaining New Information] : denies difficulty learning/retaining new information [Handling Complex Tasks] : denies difficulty handling complex tasks [Reasoning] : denies difficulty with reasoning [Spatial Ability and Orientation] : denies difficulty with spatial ability and orientation [FreeTextEntry2] : retired

## 2023-07-12 NOTE — HISTORY OF PRESENT ILLNESS
[Family Member] : family member [FreeTextEntry1] : Shoulder and knee pain from fall\par Hospitalization follow up. [de-identified] : Patient fell approximately 1 week ago and hit her head, was admitted to the hospital at that time. Imaging at the time showed no bleeding or fractures. While in the hospital patient began retaining urine and eventually needed a ayala placed. Patient is currently endorsing minimal pain on location where she hit her head, more pain in anterior R shoulder, and R medial aspect of knee.

## 2023-07-12 NOTE — PHYSICAL EXAM
[Normal] : affect was normal and insight and judgment were intact [Normal Rate] : normal rate  [Regular Rhythm] : with a regular rhythm [de-identified] : early systolic murmur loudest at L sternal border; no radiation to carotids [de-identified] : limited internal rotation of R shoulder

## 2023-07-14 ENCOUNTER — APPOINTMENT (OUTPATIENT)
Dept: UROLOGY | Facility: CLINIC | Age: 78
End: 2023-07-14

## 2023-07-14 ENCOUNTER — OUTPATIENT (OUTPATIENT)
Dept: OUTPATIENT SERVICES | Facility: HOSPITAL | Age: 78
LOS: 1 days | End: 2023-07-14
Payer: COMMERCIAL

## 2023-07-14 DIAGNOSIS — R35.0 FREQUENCY OF MICTURITION: ICD-10-CM

## 2023-07-14 DIAGNOSIS — Z98.890 OTHER SPECIFIED POSTPROCEDURAL STATES: Chronic | ICD-10-CM

## 2023-07-14 DIAGNOSIS — Z90.710 ACQUIRED ABSENCE OF BOTH CERVIX AND UTERUS: Chronic | ICD-10-CM

## 2023-07-14 PROCEDURE — G0463: CPT

## 2023-07-14 NOTE — END OF VISIT
[] : Resident [FreeTextEntry3] : likely pvr is picking up bladder tic \par recommend MRI of pelvis \par however due to hydro - will proceed wih CT

## 2023-07-14 NOTE — PHYSICAL EXAM
[General Appearance - Well Developed] : well developed [General Appearance - Well Nourished] : well nourished [Normal Appearance] : normal appearance [] : no respiratory distress [Urethral Meatus] : normal urethra [External Female Genitalia] : normal external genitalia [Skin Color & Pigmentation] : normal skin color and pigmentation [No Focal Deficits] : no focal deficits [FreeTextEntry1] : No prolapse identified on bimanual exam , vag exa with ayala n place showed no stool felt vag in ectal vault

## 2023-07-14 NOTE — HISTORY OF PRESENT ILLNESS
[FreeTextEntry1] : 77 yo F with PMhx of salpingoopherectomy and hysterectomy, DMII, HTN, HLD, recently hospitalized after falling, seen by urology for urinary retention. Ayala was placed in the hospital and was left prior to discharge where she did not pass TOV and is following up with us here today. US during her hospitalization was significant for right hydronephrosis and a large right bladder diverticulum, creatinine was downtrending after ayala placement. She denies history of retention, dysuria, fevers, chills. Has never had trouble voiding before her fall. \par  \par TOV was attempted in clinic. Patient spontaneously voided but unfortunately void was not recorded. Bladder scan following the unrecorded void showed 600cc of urine in her bladder, she was subsequently recatheterized with only 50cc of urine return. The ayala was irrigated, not significant for obstruction. The patient was not in discomfort and felt like her bladder was empty.

## 2023-07-14 NOTE — ASSESSMENT
[FreeTextEntry1] : 77 yo F with PMhx of salpingoopherectomy and hysterectomy, DMII, HTN, HLD, recently hospitalized after falling, seen by urology for urinary retention. Haro removed in clinic today.\par \par Plan:\par - Follow up with CTAP non con due to elevated creatinine \par - RTC after CTAP completed

## 2023-07-18 DIAGNOSIS — E11.9 TYPE 2 DIABETES MELLITUS WITHOUT COMPLICATIONS: ICD-10-CM

## 2023-07-18 DIAGNOSIS — R33.9 RETENTION OF URINE, UNSPECIFIED: ICD-10-CM

## 2023-07-18 DIAGNOSIS — N32.3 DIVERTICULUM OF BLADDER: ICD-10-CM

## 2023-07-19 ENCOUNTER — RESULT REVIEW (OUTPATIENT)
Age: 78
End: 2023-07-19

## 2023-07-25 ENCOUNTER — APPOINTMENT (OUTPATIENT)
Dept: CT IMAGING | Facility: IMAGING CENTER | Age: 78
End: 2023-07-25
Payer: COMMERCIAL

## 2023-07-25 ENCOUNTER — OUTPATIENT (OUTPATIENT)
Dept: OUTPATIENT SERVICES | Facility: HOSPITAL | Age: 78
LOS: 1 days | End: 2023-07-25
Payer: COMMERCIAL

## 2023-07-25 DIAGNOSIS — Z90.710 ACQUIRED ABSENCE OF BOTH CERVIX AND UTERUS: Chronic | ICD-10-CM

## 2023-07-25 DIAGNOSIS — R33.9 RETENTION OF URINE, UNSPECIFIED: ICD-10-CM

## 2023-07-25 DIAGNOSIS — Z98.890 OTHER SPECIFIED POSTPROCEDURAL STATES: Chronic | ICD-10-CM

## 2023-07-25 PROCEDURE — 74176 CT ABD & PELVIS W/O CONTRAST: CPT

## 2023-07-25 PROCEDURE — 74176 CT ABD & PELVIS W/O CONTRAST: CPT | Mod: 26

## 2023-08-04 ENCOUNTER — OUTPATIENT (OUTPATIENT)
Dept: OUTPATIENT SERVICES | Facility: HOSPITAL | Age: 78
LOS: 1 days | End: 2023-08-04
Payer: COMMERCIAL

## 2023-08-04 ENCOUNTER — APPOINTMENT (OUTPATIENT)
Dept: UROLOGY | Facility: CLINIC | Age: 78
End: 2023-08-04

## 2023-08-04 DIAGNOSIS — Z90.710 ACQUIRED ABSENCE OF BOTH CERVIX AND UTERUS: Chronic | ICD-10-CM

## 2023-08-04 DIAGNOSIS — Z98.890 OTHER SPECIFIED POSTPROCEDURAL STATES: Chronic | ICD-10-CM

## 2023-08-04 DIAGNOSIS — R35.0 FREQUENCY OF MICTURITION: ICD-10-CM

## 2023-08-04 PROCEDURE — G0463: CPT

## 2023-08-08 ENCOUNTER — OUTPATIENT (OUTPATIENT)
Dept: OUTPATIENT SERVICES | Facility: HOSPITAL | Age: 78
LOS: 1 days | End: 2023-08-08

## 2023-08-08 ENCOUNTER — RESULT REVIEW (OUTPATIENT)
Age: 78
End: 2023-08-08

## 2023-08-08 ENCOUNTER — APPOINTMENT (OUTPATIENT)
Dept: NUCLEAR MEDICINE | Facility: HOSPITAL | Age: 78
End: 2023-08-08
Payer: SELF-PAY

## 2023-08-08 DIAGNOSIS — Z98.890 OTHER SPECIFIED POSTPROCEDURAL STATES: Chronic | ICD-10-CM

## 2023-08-08 DIAGNOSIS — N13.30 UNSPECIFIED HYDRONEPHROSIS: ICD-10-CM

## 2023-08-08 DIAGNOSIS — Z90.710 ACQUIRED ABSENCE OF BOTH CERVIX AND UTERUS: Chronic | ICD-10-CM

## 2023-08-08 PROCEDURE — 51702 INSERT TEMP BLADDER CATH: CPT

## 2023-08-08 PROCEDURE — 78708 K FLOW/FUNCT IMAGE W/DRUG: CPT | Mod: 26

## 2023-08-08 NOTE — PHYSICAL EXAM
[General Appearance - Well Developed] : well developed [General Appearance - Well Nourished] : well nourished [Normal Appearance] : normal appearance [Skin Color & Pigmentation] : normal skin color and pigmentation [] : no respiratory distress [Not Anxious] : not anxious [Normal Station and Gait] : the gait and station were normal for the patient's age [No Focal Deficits] : no focal deficits

## 2023-08-09 NOTE — ASSESSMENT
[FreeTextEntry1] : 79 yo F with PMhx of salpingoopherectomy and hysterectomy, DMII, HTN, HLD, recently hospitalized after falling, seen by urology for urinary retention. CTAP showing a 12cm diverticulum causing right hydroureteronephrosis, right kidney looks atrophic on CT scan, discussed that renal lasix scan would be helpful in assessing if intervention is needed to relieve hydro.  Plan: - Renal lasix scan

## 2023-08-09 NOTE — HISTORY OF PRESENT ILLNESS
[FreeTextEntry1] : 77 yo F with PMhx of salpingoopherectomy and hysterectomy, DMII, HTN, HLD, recently hospitalized after falling, seen by urology for urinary retention. Ayala was placed in the hospital and was left prior to discharge where she did not pass TOV and is following up with us here today. US during her hospitalization was significant for right hydronephrosis and a large right bladder diverticulum, creatinine was downtrending after ayala placement. She denies history of retention, dysuria, fevers, chills. Has never had trouble voiding before her fall.  TOV was attempted in clinic. Patient spontaneously voided but unfortunately void was not recorded. Bladder scan following the unrecorded void showed 600cc of urine in her bladder, she was subsequently recatheterized with only 50cc of urine return. The ayala was irrigated, not significant for obstruction. The patient was not in discomfort and felt like her bladder was empty.  8/4/23: Patient returns after CT renal stone hunt significant for a 12cm diverticulum causing right hydroureteronephrosis. Patient is comfortable, not endorsing issues.

## 2023-08-11 ENCOUNTER — APPOINTMENT (OUTPATIENT)
Dept: UROLOGY | Facility: CLINIC | Age: 78
End: 2023-08-11

## 2023-08-11 ENCOUNTER — OUTPATIENT (OUTPATIENT)
Dept: OUTPATIENT SERVICES | Facility: HOSPITAL | Age: 78
LOS: 1 days | End: 2023-08-11
Payer: SELF-PAY

## 2023-08-11 DIAGNOSIS — R35.0 FREQUENCY OF MICTURITION: ICD-10-CM

## 2023-08-11 DIAGNOSIS — Z98.890 OTHER SPECIFIED POSTPROCEDURAL STATES: Chronic | ICD-10-CM

## 2023-08-11 DIAGNOSIS — Z90.710 ACQUIRED ABSENCE OF BOTH CERVIX AND UTERUS: Chronic | ICD-10-CM

## 2023-08-11 PROCEDURE — G0463: CPT

## 2023-08-14 NOTE — PHYSICAL EXAM
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 No concerning findings on pelvic exam.

## 2023-08-14 NOTE — HISTORY OF PRESENT ILLNESS
[FreeTextEntry1] : 79 yo F with PMhx of salpingoopherectomy and hysterectomy, DMII, HTN, HLD, recently hospitalized after falling, seen by urology for urinary retention. Ayala was placed in the hospital and was left prior to discharge where she did not pass TOV and is following up with us here today. US during her hospitalization was significant for right hydronephrosis and a large right bladder diverticulum, creatinine was downtrending after ayala placement. She denies history of retention, dysuria, fevers, chills. Has never had trouble voiding before her fall.  TOV was attempted in clinic. Patient spontaneously voided but unfortunately void was not recorded. Bladder scan following the unrecorded void showed 600cc of urine in her bladder, she was subsequently recatheterized with only 50cc of urine return. The ayala was irrigated, not significant for obstruction. The patient was not in discomfort and felt like her bladder was empty.  8/4/23: Patient returns after CT renal stone hunt significant for a 12cm diverticulum causing right hydroureteronephrosis. Patient is comfortable, not endorsing issues.  8/12: Renal lasix scan on 8/8 with minimal flow to and function of the right kidney. Function is so poor that it is not possible to determine the likelihood of obstruction. Normal flow to and function of the left kidney with no evidence of obstruction. Differential renal function: Right kidney: 4%; Left kidney: 96%. Results reviewed with patient. CT reviewed redemonstrating 12cm diverticulum obstructing the right kidney causing loss of function over time. Vaginal exam with no signs of malignancy or mechanical obstruction. Otherwise patient feels comfortable, has been voiding well, no complaints.

## 2023-08-14 NOTE — ASSESSMENT
[FreeTextEntry1] : 79 yo F with PMhx of salpingoopherectomy and hysterectomy, DMII, HTN, HLD, recently hospitalized after falling, seen by urology for urinary retention. 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 Discussed findings of renal lasix scan with patient and loss of right kidney function likely 2/2 obstruction from diverticulum Given the presence of such a large volume diverticulum, important to perform cystoscopy to evaluate the diverticulum, to r/o malignancy.   Plan: - RTC for cystoscopy - No further intervention at this time as patient is comfortable and right kidney noted to have hydronephrosis only with 4% function, no indication for further intervention at this time.  - f/u GYN for f/u  - Discussed with Dr. Mendoza

## 2023-08-15 DIAGNOSIS — N13.30 UNSPECIFIED HYDRONEPHROSIS: ICD-10-CM

## 2023-08-18 ENCOUNTER — OUTPATIENT (OUTPATIENT)
Dept: OUTPATIENT SERVICES | Facility: HOSPITAL | Age: 78
LOS: 1 days | End: 2023-08-18
Payer: SELF-PAY

## 2023-08-18 ENCOUNTER — APPOINTMENT (OUTPATIENT)
Dept: INTERNAL MEDICINE | Facility: CLINIC | Age: 78
End: 2023-08-18
Payer: COMMERCIAL

## 2023-08-18 ENCOUNTER — NON-APPOINTMENT (OUTPATIENT)
Age: 78
End: 2023-08-18

## 2023-08-18 ENCOUNTER — APPOINTMENT (OUTPATIENT)
Dept: ULTRASOUND IMAGING | Facility: CLINIC | Age: 78
End: 2023-08-18
Payer: COMMERCIAL

## 2023-08-18 ENCOUNTER — APPOINTMENT (OUTPATIENT)
Dept: CARDIOLOGY | Facility: CLINIC | Age: 78
End: 2023-08-18
Payer: COMMERCIAL

## 2023-08-18 ENCOUNTER — OUTPATIENT (OUTPATIENT)
Dept: OUTPATIENT SERVICES | Facility: HOSPITAL | Age: 78
LOS: 1 days | End: 2023-08-18

## 2023-08-18 VITALS
HEIGHT: 61 IN | SYSTOLIC BLOOD PRESSURE: 126 MMHG | DIASTOLIC BLOOD PRESSURE: 80 MMHG | BODY MASS INDEX: 25.11 KG/M2 | RESPIRATION RATE: 16 BRPM | HEART RATE: 83 BPM | WEIGHT: 133 LBS | OXYGEN SATURATION: 99 %

## 2023-08-18 DIAGNOSIS — Z78.9 OTHER SPECIFIED HEALTH STATUS: ICD-10-CM

## 2023-08-18 DIAGNOSIS — Z98.890 OTHER SPECIFIED POSTPROCEDURAL STATES: Chronic | ICD-10-CM

## 2023-08-18 DIAGNOSIS — Z90.710 ACQUIRED ABSENCE OF BOTH CERVIX AND UTERUS: Chronic | ICD-10-CM

## 2023-08-18 DIAGNOSIS — R06.02 SHORTNESS OF BREATH: ICD-10-CM

## 2023-08-18 DIAGNOSIS — E78.5 HYPERLIPIDEMIA, UNSPECIFIED: ICD-10-CM

## 2023-08-18 DIAGNOSIS — Z86.718 PERSONAL HISTORY OF OTHER VENOUS THROMBOSIS AND EMBOLISM: ICD-10-CM

## 2023-08-18 DIAGNOSIS — R01.1 CARDIAC MURMUR, UNSPECIFIED: ICD-10-CM

## 2023-08-18 PROCEDURE — 93010 ELECTROCARDIOGRAM REPORT: CPT

## 2023-08-18 PROCEDURE — 99204 OFFICE O/P NEW MOD 45 MIN: CPT

## 2023-08-18 PROCEDURE — 93971 EXTREMITY STUDY: CPT

## 2023-08-18 PROCEDURE — 93971 EXTREMITY STUDY: CPT | Mod: 26,RT

## 2023-08-18 PROCEDURE — 93306 TTE W/DOPPLER COMPLETE: CPT | Mod: 26

## 2023-08-18 PROCEDURE — ZZZZZ: CPT

## 2023-08-18 NOTE — HISTORY OF PRESENT ILLNESS
[FreeTextEntry1] : Heather Lezama is a 78-year-old woman with hypertension, hyperlipidemia, multinodular goiter who is here for management of hypertension, hyperlipidemia and evaluation of systolic murmur. Patient affirms lower extremity swelling, right greater than the left. Has pain of the right lower extremity. Patient denies any chest pain, dyspnea, or palpitations. Denies any history of smoking. Hypertension medications HCTZ and ACEI were on hold.

## 2023-08-18 NOTE — PHYSICAL EXAM
[Well Developed] : well developed [Normal Conjunctiva] : normal conjunctiva [Normal S1, S2] : normal S1, S2 [No Rub] : no rub [No Respiratory Distress] : no respiratory distress  [Soft] : abdomen soft [Non Tender] : non-tender [Normal Gait] : normal gait [No Rash] : no rash [Moves all extremities] : moves all extremities [Alert and Oriented] : alert and oriented [Normal memory] : normal memory [No Carotid Bruit] : no carotid bruit [Clear Lung Fields] : clear lung fields [Normal Bowel Sounds] : normal bowel sounds [No Focal Deficits] : no focal deficits [Normal Speech] : normal speech [de-identified] : 1/6 systolic murmur left sternal border.  [de-identified] : 2/6 PSM at left sternal border.  [de-identified] : rt sided basilar inspiratory crackles.  [de-identified] : 2+ edema rt sided, 1+ edema left sided

## 2023-08-18 NOTE — REASON FOR VISIT
[Hyperlipidemia] : hyperlipidemia [Hypertension] : hypertension [FreeTextEntry1] : 78 year old woman with hypertension, hyperlipidemia, multinodular goiter who is here for management of hypertension, hyperlipidemia and evaluation of systolic murmur.

## 2023-08-18 NOTE — REVIEW OF SYSTEMS
[Negative] : Constitutional [Fever] : no fever [Sore Throat] : no sore throat [Dyspnea on exertion] : not dyspnea during exertion [Chest Discomfort] : no chest discomfort [Lower Ext Edema] : no extremity edema [Palpitations] : no palpitations [Orthopnea] : no orthopnea [PND] : no PND [Cough] : no cough [Wheezing] : no wheezing [Coughing Up Blood] : no hemoptysis [Abdominal Pain] : no abdominal pain [Nausea] : no nausea [Vomiting] : no vomiting [Dysuria] : no dysuria [Joint Pain] : no joint pain [Rash] : no rash [Itching] : no itching [Dizziness] : no dizziness [Tremor] : no tremor was seen [Numbness (Hypoesthesia)] : no numbness [Convulsions] : no convulsions [Tingling (Paresthesia)] : no tingling [Confusion] : no confusion was observed [Easy Bleeding] : no tendency for easy bleeding

## 2023-08-18 NOTE — DISCUSSION/SUMMARY
[FreeTextEntry1] : In summary Heather Lezama is a 78-year-old woman with hypertension, hyperlipidemia, multinodular goiter who is here for management of hypertension, hyperlipidemia and evaluation of systolic murmur. For her hypertension BP, today is 126/80 well controlled, HCTZ and ACE inhibitor held after discharge. Hyperlipidemia, continue Rosuvastatin 5 mg once daily. EKG done today showing normal sinus rhythm. For her lower extremity edema right more than left, rule out DVT. Seen by PCP today and making arrangements for Doppler of lower extremity. Heart murmur, Echo done showed normal LV systolic function and mild TR. Echo results explained. Daughter was explained about the plan of care. Spent 45 minutes on encounter.

## 2023-08-20 NOTE — END OF VISIT
[] : Resident [FreeTextEntry3] : Pt with unilateral leg swelling today.  Concern is for possibility of R LE DVT.  Pt and daughter do not want to go to ED, concerned for significant cost incurred at last ED visit and hospitalization in context of lack of insurance.  As swelling may be bland - no cords, no excess warmth, pt hemodynamically stable and with no signs systemic illness at present, have arranged with Radiology for US of R LE today to r/o DVT.  Inciting factor likely sizable ?bladder diverticulum due for more definitive evaluation with urology on 8/29.  This is already creating mass effect on ureter right side.  Pt and daughter understand that if scan positive or even if scan negative and patient undergoes any clinical deterioration that must get to ED for evaluation.

## 2023-08-20 NOTE — PHYSICAL EXAM
[Pedal Pulses Present] : the pedal pulses are present [Normal] : normal gait, coordination grossly intact, no focal deficits and deep tendon reflexes were 2+ and symmetric [de-identified] : Multinodular goiter [de-identified] : Significant swelling of R LE [de-identified] : R LE swelling

## 2023-08-20 NOTE — PLAN
[FreeTextEntry1] : - Very likely DVT, possible venous compression 2/2 known bladder cyst - Pt not having any PE symptoms at this time - Spoke to patient about going to ER, pt resistant due to cost, sent for outpatient radiology for venous doppler, will f/u result and decide on further Tx and eval based on result

## 2023-08-21 DIAGNOSIS — R01.1 CARDIAC MURMUR, UNSPECIFIED: ICD-10-CM

## 2023-08-21 DIAGNOSIS — N32.3 DIVERTICULUM OF BLADDER: ICD-10-CM

## 2023-08-21 DIAGNOSIS — Z78.9 OTHER SPECIFIED HEALTH STATUS: ICD-10-CM

## 2023-08-21 DIAGNOSIS — E78.5 HYPERLIPIDEMIA, UNSPECIFIED: ICD-10-CM

## 2023-08-21 DIAGNOSIS — M79.89 OTHER SPECIFIED SOFT TISSUE DISORDERS: ICD-10-CM

## 2023-08-21 DIAGNOSIS — I10 ESSENTIAL (PRIMARY) HYPERTENSION: ICD-10-CM

## 2023-08-21 DIAGNOSIS — R60.0 LOCALIZED EDEMA: ICD-10-CM

## 2023-09-29 ENCOUNTER — OUTPATIENT (OUTPATIENT)
Dept: OUTPATIENT SERVICES | Facility: HOSPITAL | Age: 78
LOS: 1 days | End: 2023-09-29
Payer: SELF-PAY

## 2023-09-29 ENCOUNTER — APPOINTMENT (OUTPATIENT)
Dept: UROLOGY | Facility: CLINIC | Age: 78
End: 2023-09-29

## 2023-09-29 DIAGNOSIS — R35.0 FREQUENCY OF MICTURITION: ICD-10-CM

## 2023-09-29 DIAGNOSIS — Z98.890 OTHER SPECIFIED POSTPROCEDURAL STATES: Chronic | ICD-10-CM

## 2023-09-29 DIAGNOSIS — Z90.710 ACQUIRED ABSENCE OF BOTH CERVIX AND UTERUS: Chronic | ICD-10-CM

## 2023-09-29 PROCEDURE — 52000 CYSTOURETHROSCOPY: CPT

## 2023-10-10 DIAGNOSIS — N32.3 DIVERTICULUM OF BLADDER: ICD-10-CM

## 2023-10-17 ENCOUNTER — OUTPATIENT (OUTPATIENT)
Dept: OUTPATIENT SERVICES | Facility: HOSPITAL | Age: 78
LOS: 1 days | End: 2023-10-17

## 2023-10-17 ENCOUNTER — EMERGENCY (EMERGENCY)
Facility: HOSPITAL | Age: 78
LOS: 1 days | Discharge: HOME | End: 2023-10-17
Attending: STUDENT IN AN ORGANIZED HEALTH CARE EDUCATION/TRAINING PROGRAM | Admitting: STUDENT IN AN ORGANIZED HEALTH CARE EDUCATION/TRAINING PROGRAM
Payer: MEDICARE

## 2023-10-17 ENCOUNTER — APPOINTMENT (OUTPATIENT)
Dept: INTERNAL MEDICINE | Facility: CLINIC | Age: 78
End: 2023-10-17
Payer: COMMERCIAL

## 2023-10-17 VITALS
SYSTOLIC BLOOD PRESSURE: 170 MMHG | DIASTOLIC BLOOD PRESSURE: 76 MMHG | RESPIRATION RATE: 16 BRPM | WEIGHT: 136 LBS | BODY MASS INDEX: 25.68 KG/M2 | HEIGHT: 61 IN | OXYGEN SATURATION: 98 % | HEART RATE: 78 BPM | TEMPERATURE: 97.7 F

## 2023-10-17 VITALS
OXYGEN SATURATION: 100 % | TEMPERATURE: 98 F | HEART RATE: 74 BPM | RESPIRATION RATE: 20 BRPM | DIASTOLIC BLOOD PRESSURE: 75 MMHG | SYSTOLIC BLOOD PRESSURE: 171 MMHG

## 2023-10-17 DIAGNOSIS — Z98.890 OTHER SPECIFIED POSTPROCEDURAL STATES: Chronic | ICD-10-CM

## 2023-10-17 DIAGNOSIS — N32.3 DIVERTICULUM OF BLADDER: ICD-10-CM

## 2023-10-17 DIAGNOSIS — Z90.710 ACQUIRED ABSENCE OF BOTH CERVIX AND UTERUS: Chronic | ICD-10-CM

## 2023-10-17 DIAGNOSIS — I10 ESSENTIAL (PRIMARY) HYPERTENSION: ICD-10-CM

## 2023-10-17 DIAGNOSIS — R33.9 RETENTION OF URINE, UNSPECIFIED: ICD-10-CM

## 2023-10-17 DIAGNOSIS — M79.89 OTHER SPECIFIED SOFT TISSUE DISORDERS: ICD-10-CM

## 2023-10-17 LAB
ALBUMIN SERPL ELPH-MCNC: 4.7 G/DL — SIGNIFICANT CHANGE UP (ref 3.3–5)
ALBUMIN SERPL ELPH-MCNC: 4.7 G/DL — SIGNIFICANT CHANGE UP (ref 3.3–5)
ALP SERPL-CCNC: 67 U/L — SIGNIFICANT CHANGE UP (ref 40–120)
ALP SERPL-CCNC: 67 U/L — SIGNIFICANT CHANGE UP (ref 40–120)
ALT FLD-CCNC: 7 U/L — SIGNIFICANT CHANGE UP (ref 4–33)
ALT FLD-CCNC: 7 U/L — SIGNIFICANT CHANGE UP (ref 4–33)
ANION GAP SERPL CALC-SCNC: 12 MMOL/L — SIGNIFICANT CHANGE UP (ref 7–14)
ANION GAP SERPL CALC-SCNC: 12 MMOL/L — SIGNIFICANT CHANGE UP (ref 7–14)
APPEARANCE UR: CLEAR — SIGNIFICANT CHANGE UP
APPEARANCE UR: CLEAR — SIGNIFICANT CHANGE UP
APTT BLD: 31.4 SEC — SIGNIFICANT CHANGE UP (ref 24.5–35.6)
APTT BLD: 31.4 SEC — SIGNIFICANT CHANGE UP (ref 24.5–35.6)
AST SERPL-CCNC: 24 U/L — SIGNIFICANT CHANGE UP (ref 4–32)
AST SERPL-CCNC: 24 U/L — SIGNIFICANT CHANGE UP (ref 4–32)
BACTERIA # UR AUTO: NEGATIVE /HPF — SIGNIFICANT CHANGE UP
BACTERIA # UR AUTO: NEGATIVE /HPF — SIGNIFICANT CHANGE UP
BASE EXCESS BLDV CALC-SCNC: -0.9 MMOL/L — SIGNIFICANT CHANGE UP (ref -2–3)
BASE EXCESS BLDV CALC-SCNC: -0.9 MMOL/L — SIGNIFICANT CHANGE UP (ref -2–3)
BASOPHILS # BLD AUTO: 0.09 K/UL — SIGNIFICANT CHANGE UP (ref 0–0.2)
BASOPHILS # BLD AUTO: 0.09 K/UL — SIGNIFICANT CHANGE UP (ref 0–0.2)
BASOPHILS NFR BLD AUTO: 1.8 % — SIGNIFICANT CHANGE UP (ref 0–2)
BASOPHILS NFR BLD AUTO: 1.8 % — SIGNIFICANT CHANGE UP (ref 0–2)
BILIRUB SERPL-MCNC: 0.3 MG/DL — SIGNIFICANT CHANGE UP (ref 0.2–1.2)
BILIRUB SERPL-MCNC: 0.3 MG/DL — SIGNIFICANT CHANGE UP (ref 0.2–1.2)
BILIRUB UR-MCNC: NEGATIVE — SIGNIFICANT CHANGE UP
BILIRUB UR-MCNC: NEGATIVE — SIGNIFICANT CHANGE UP
BLD GP AB SCN SERPL QL: NEGATIVE — SIGNIFICANT CHANGE UP
BLD GP AB SCN SERPL QL: NEGATIVE — SIGNIFICANT CHANGE UP
BLOOD GAS VENOUS COMPREHENSIVE RESULT: SIGNIFICANT CHANGE UP
BLOOD GAS VENOUS COMPREHENSIVE RESULT: SIGNIFICANT CHANGE UP
BUN SERPL-MCNC: 28 MG/DL — HIGH (ref 7–23)
BUN SERPL-MCNC: 28 MG/DL — HIGH (ref 7–23)
CALCIUM SERPL-MCNC: 10 MG/DL — SIGNIFICANT CHANGE UP (ref 8.4–10.5)
CALCIUM SERPL-MCNC: 10 MG/DL — SIGNIFICANT CHANGE UP (ref 8.4–10.5)
CAST: 0 /LPF — SIGNIFICANT CHANGE UP (ref 0–4)
CAST: 0 /LPF — SIGNIFICANT CHANGE UP (ref 0–4)
CHLORIDE BLDV-SCNC: 104 MMOL/L — SIGNIFICANT CHANGE UP (ref 96–108)
CHLORIDE BLDV-SCNC: 104 MMOL/L — SIGNIFICANT CHANGE UP (ref 96–108)
CHLORIDE SERPL-SCNC: 104 MMOL/L — SIGNIFICANT CHANGE UP (ref 98–107)
CHLORIDE SERPL-SCNC: 104 MMOL/L — SIGNIFICANT CHANGE UP (ref 98–107)
CO2 BLDV-SCNC: 27.7 MMOL/L — HIGH (ref 22–26)
CO2 BLDV-SCNC: 27.7 MMOL/L — HIGH (ref 22–26)
CO2 SERPL-SCNC: 24 MMOL/L — SIGNIFICANT CHANGE UP (ref 22–31)
CO2 SERPL-SCNC: 24 MMOL/L — SIGNIFICANT CHANGE UP (ref 22–31)
COLOR SPEC: YELLOW — SIGNIFICANT CHANGE UP
COLOR SPEC: YELLOW — SIGNIFICANT CHANGE UP
CREAT SERPL-MCNC: 1.49 MG/DL — HIGH (ref 0.5–1.3)
CREAT SERPL-MCNC: 1.49 MG/DL — HIGH (ref 0.5–1.3)
DIFF PNL FLD: NEGATIVE — SIGNIFICANT CHANGE UP
DIFF PNL FLD: NEGATIVE — SIGNIFICANT CHANGE UP
EGFR: 36 ML/MIN/1.73M2 — LOW
EGFR: 36 ML/MIN/1.73M2 — LOW
EOSINOPHIL # BLD AUTO: 0.11 K/UL — SIGNIFICANT CHANGE UP (ref 0–0.5)
EOSINOPHIL # BLD AUTO: 0.11 K/UL — SIGNIFICANT CHANGE UP (ref 0–0.5)
EOSINOPHIL NFR BLD AUTO: 2.2 % — SIGNIFICANT CHANGE UP (ref 0–6)
EOSINOPHIL NFR BLD AUTO: 2.2 % — SIGNIFICANT CHANGE UP (ref 0–6)
GAS PNL BLDV: 139 MMOL/L — SIGNIFICANT CHANGE UP (ref 136–145)
GAS PNL BLDV: 139 MMOL/L — SIGNIFICANT CHANGE UP (ref 136–145)
GLUCOSE BLDV-MCNC: 95 MG/DL — SIGNIFICANT CHANGE UP (ref 70–99)
GLUCOSE BLDV-MCNC: 95 MG/DL — SIGNIFICANT CHANGE UP (ref 70–99)
GLUCOSE SERPL-MCNC: 97 MG/DL — SIGNIFICANT CHANGE UP (ref 70–99)
GLUCOSE SERPL-MCNC: 97 MG/DL — SIGNIFICANT CHANGE UP (ref 70–99)
GLUCOSE UR QL: NEGATIVE MG/DL — SIGNIFICANT CHANGE UP
GLUCOSE UR QL: NEGATIVE MG/DL — SIGNIFICANT CHANGE UP
HCO3 BLDV-SCNC: 26 MMOL/L — SIGNIFICANT CHANGE UP (ref 22–29)
HCO3 BLDV-SCNC: 26 MMOL/L — SIGNIFICANT CHANGE UP (ref 22–29)
HCT VFR BLD CALC: 33.9 % — LOW (ref 34.5–45)
HCT VFR BLD CALC: 33.9 % — LOW (ref 34.5–45)
HCT VFR BLDA CALC: 33 % — LOW (ref 34.5–46.5)
HCT VFR BLDA CALC: 33 % — LOW (ref 34.5–46.5)
HGB BLD CALC-MCNC: 10.9 G/DL — LOW (ref 11.7–16.1)
HGB BLD CALC-MCNC: 10.9 G/DL — LOW (ref 11.7–16.1)
HGB BLD-MCNC: 10.6 G/DL — LOW (ref 11.5–15.5)
HGB BLD-MCNC: 10.6 G/DL — LOW (ref 11.5–15.5)
IANC: 2.32 K/UL — SIGNIFICANT CHANGE UP (ref 1.8–7.4)
IANC: 2.32 K/UL — SIGNIFICANT CHANGE UP (ref 1.8–7.4)
IMM GRANULOCYTES NFR BLD AUTO: 0.2 % — SIGNIFICANT CHANGE UP (ref 0–0.9)
IMM GRANULOCYTES NFR BLD AUTO: 0.2 % — SIGNIFICANT CHANGE UP (ref 0–0.9)
INR BLD: 0.99 RATIO — SIGNIFICANT CHANGE UP (ref 0.85–1.18)
INR BLD: 0.99 RATIO — SIGNIFICANT CHANGE UP (ref 0.85–1.18)
KETONES UR-MCNC: NEGATIVE MG/DL — SIGNIFICANT CHANGE UP
KETONES UR-MCNC: NEGATIVE MG/DL — SIGNIFICANT CHANGE UP
LACTATE BLDV-MCNC: 1.4 MMOL/L — SIGNIFICANT CHANGE UP (ref 0.5–2)
LACTATE BLDV-MCNC: 1.4 MMOL/L — SIGNIFICANT CHANGE UP (ref 0.5–2)
LEUKOCYTE ESTERASE UR-ACNC: NEGATIVE — SIGNIFICANT CHANGE UP
LEUKOCYTE ESTERASE UR-ACNC: NEGATIVE — SIGNIFICANT CHANGE UP
LYMPHOCYTES # BLD AUTO: 1.95 K/UL — SIGNIFICANT CHANGE UP (ref 1–3.3)
LYMPHOCYTES # BLD AUTO: 1.95 K/UL — SIGNIFICANT CHANGE UP (ref 1–3.3)
LYMPHOCYTES # BLD AUTO: 38.8 % — SIGNIFICANT CHANGE UP (ref 13–44)
LYMPHOCYTES # BLD AUTO: 38.8 % — SIGNIFICANT CHANGE UP (ref 13–44)
MAGNESIUM SERPL-MCNC: 2.2 MG/DL — SIGNIFICANT CHANGE UP (ref 1.6–2.6)
MAGNESIUM SERPL-MCNC: 2.2 MG/DL — SIGNIFICANT CHANGE UP (ref 1.6–2.6)
MCHC RBC-ENTMCNC: 30.8 PG — SIGNIFICANT CHANGE UP (ref 27–34)
MCHC RBC-ENTMCNC: 30.8 PG — SIGNIFICANT CHANGE UP (ref 27–34)
MCHC RBC-ENTMCNC: 31.3 GM/DL — LOW (ref 32–36)
MCHC RBC-ENTMCNC: 31.3 GM/DL — LOW (ref 32–36)
MCV RBC AUTO: 98.5 FL — SIGNIFICANT CHANGE UP (ref 80–100)
MCV RBC AUTO: 98.5 FL — SIGNIFICANT CHANGE UP (ref 80–100)
MONOCYTES # BLD AUTO: 0.54 K/UL — SIGNIFICANT CHANGE UP (ref 0–0.9)
MONOCYTES # BLD AUTO: 0.54 K/UL — SIGNIFICANT CHANGE UP (ref 0–0.9)
MONOCYTES NFR BLD AUTO: 10.8 % — SIGNIFICANT CHANGE UP (ref 2–14)
MONOCYTES NFR BLD AUTO: 10.8 % — SIGNIFICANT CHANGE UP (ref 2–14)
NEUTROPHILS # BLD AUTO: 2.32 K/UL — SIGNIFICANT CHANGE UP (ref 1.8–7.4)
NEUTROPHILS # BLD AUTO: 2.32 K/UL — SIGNIFICANT CHANGE UP (ref 1.8–7.4)
NEUTROPHILS NFR BLD AUTO: 46.2 % — SIGNIFICANT CHANGE UP (ref 43–77)
NEUTROPHILS NFR BLD AUTO: 46.2 % — SIGNIFICANT CHANGE UP (ref 43–77)
NITRITE UR-MCNC: NEGATIVE — SIGNIFICANT CHANGE UP
NITRITE UR-MCNC: NEGATIVE — SIGNIFICANT CHANGE UP
NRBC # BLD: 0 /100 WBCS — SIGNIFICANT CHANGE UP (ref 0–0)
NRBC # BLD: 0 /100 WBCS — SIGNIFICANT CHANGE UP (ref 0–0)
NRBC # FLD: 0 K/UL — SIGNIFICANT CHANGE UP (ref 0–0)
NRBC # FLD: 0 K/UL — SIGNIFICANT CHANGE UP (ref 0–0)
PCO2 BLDV: 53 MMHG — HIGH (ref 39–52)
PCO2 BLDV: 53 MMHG — HIGH (ref 39–52)
PH BLDV: 7.3 — LOW (ref 7.32–7.43)
PH BLDV: 7.3 — LOW (ref 7.32–7.43)
PH UR: 6 — SIGNIFICANT CHANGE UP (ref 5–8)
PH UR: 6 — SIGNIFICANT CHANGE UP (ref 5–8)
PHOSPHATE SERPL-MCNC: 3 MG/DL — SIGNIFICANT CHANGE UP (ref 2.5–4.5)
PHOSPHATE SERPL-MCNC: 3 MG/DL — SIGNIFICANT CHANGE UP (ref 2.5–4.5)
PLATELET # BLD AUTO: 266 K/UL — SIGNIFICANT CHANGE UP (ref 150–400)
PLATELET # BLD AUTO: 266 K/UL — SIGNIFICANT CHANGE UP (ref 150–400)
PO2 BLDV: 34 MMHG — SIGNIFICANT CHANGE UP (ref 25–45)
PO2 BLDV: 34 MMHG — SIGNIFICANT CHANGE UP (ref 25–45)
POTASSIUM BLDV-SCNC: 4 MMOL/L — SIGNIFICANT CHANGE UP (ref 3.5–5.1)
POTASSIUM BLDV-SCNC: 4 MMOL/L — SIGNIFICANT CHANGE UP (ref 3.5–5.1)
POTASSIUM SERPL-MCNC: 4.6 MMOL/L — SIGNIFICANT CHANGE UP (ref 3.5–5.3)
POTASSIUM SERPL-MCNC: 4.6 MMOL/L — SIGNIFICANT CHANGE UP (ref 3.5–5.3)
POTASSIUM SERPL-SCNC: 4.6 MMOL/L — SIGNIFICANT CHANGE UP (ref 3.5–5.3)
POTASSIUM SERPL-SCNC: 4.6 MMOL/L — SIGNIFICANT CHANGE UP (ref 3.5–5.3)
PROT SERPL-MCNC: 9 G/DL — HIGH (ref 6–8.3)
PROT SERPL-MCNC: 9 G/DL — HIGH (ref 6–8.3)
PROT UR-MCNC: NEGATIVE MG/DL — SIGNIFICANT CHANGE UP
PROT UR-MCNC: NEGATIVE MG/DL — SIGNIFICANT CHANGE UP
PROTHROM AB SERPL-ACNC: 11.2 SEC — SIGNIFICANT CHANGE UP (ref 9.5–13)
PROTHROM AB SERPL-ACNC: 11.2 SEC — SIGNIFICANT CHANGE UP (ref 9.5–13)
RBC # BLD: 3.44 M/UL — LOW (ref 3.8–5.2)
RBC # BLD: 3.44 M/UL — LOW (ref 3.8–5.2)
RBC # FLD: 12.4 % — SIGNIFICANT CHANGE UP (ref 10.3–14.5)
RBC # FLD: 12.4 % — SIGNIFICANT CHANGE UP (ref 10.3–14.5)
RBC CASTS # UR COMP ASSIST: 0 /HPF — SIGNIFICANT CHANGE UP (ref 0–4)
RBC CASTS # UR COMP ASSIST: 0 /HPF — SIGNIFICANT CHANGE UP (ref 0–4)
RH IG SCN BLD-IMP: POSITIVE — SIGNIFICANT CHANGE UP
RH IG SCN BLD-IMP: POSITIVE — SIGNIFICANT CHANGE UP
SAO2 % BLDV: 41 % — LOW (ref 67–88)
SAO2 % BLDV: 41 % — LOW (ref 67–88)
SODIUM SERPL-SCNC: 140 MMOL/L — SIGNIFICANT CHANGE UP (ref 135–145)
SODIUM SERPL-SCNC: 140 MMOL/L — SIGNIFICANT CHANGE UP (ref 135–145)
SP GR SPEC: 1.02 — SIGNIFICANT CHANGE UP (ref 1–1.03)
SP GR SPEC: 1.02 — SIGNIFICANT CHANGE UP (ref 1–1.03)
SQUAMOUS # UR AUTO: 0 /HPF — SIGNIFICANT CHANGE UP (ref 0–5)
SQUAMOUS # UR AUTO: 0 /HPF — SIGNIFICANT CHANGE UP (ref 0–5)
UROBILINOGEN FLD QL: 0.2 MG/DL — SIGNIFICANT CHANGE UP (ref 0.2–1)
UROBILINOGEN FLD QL: 0.2 MG/DL — SIGNIFICANT CHANGE UP (ref 0.2–1)
WBC # BLD: 5.02 K/UL — SIGNIFICANT CHANGE UP (ref 3.8–10.5)
WBC # BLD: 5.02 K/UL — SIGNIFICANT CHANGE UP (ref 3.8–10.5)
WBC # FLD AUTO: 5.02 K/UL — SIGNIFICANT CHANGE UP (ref 3.8–10.5)
WBC # FLD AUTO: 5.02 K/UL — SIGNIFICANT CHANGE UP (ref 3.8–10.5)
WBC UR QL: 0 /HPF — SIGNIFICANT CHANGE UP (ref 0–5)
WBC UR QL: 0 /HPF — SIGNIFICANT CHANGE UP (ref 0–5)

## 2023-10-17 PROCEDURE — 99214 OFFICE O/P EST MOD 30 MIN: CPT | Mod: GC

## 2023-10-17 PROCEDURE — 93971 EXTREMITY STUDY: CPT | Mod: 26,LT

## 2023-10-17 PROCEDURE — 99285 EMERGENCY DEPT VISIT HI MDM: CPT

## 2023-10-17 PROCEDURE — 74177 CT ABD & PELVIS W/CONTRAST: CPT | Mod: 26,MA

## 2023-10-17 RX ORDER — ACETAMINOPHEN 500 MG
975 TABLET ORAL ONCE
Refills: 0 | Status: COMPLETED | OUTPATIENT
Start: 2023-10-17 | End: 2023-10-17

## 2023-10-17 RX ADMIN — Medication 975 MILLIGRAM(S): at 21:21

## 2023-10-17 NOTE — ED PROVIDER NOTE - NSFOLLOWUPINSTRUCTIONS_ED_ALL_ED_FT
You were seen in our department for Pelvic mass and Right lower extremity edema.  YOU WERE ALSO FOUND TO HAVE PULMONARY NODULES ON YOUR CHEST CT.   Follow up with your PMD in 48-72 hours for further monitoring.  Follow up with GYN oncology clinic within 1 week for further evaluation, they will call you to schedule an appointment.  if you develop any worsening abdominal pain, intractable vomiting, chest pain, dizziness, high fevers, weakness, numbness, tingling, vision changes, or any worsening symptoms return to our ED for evaluation.

## 2023-10-17 NOTE — ED PROVIDER NOTE - OBJECTIVE STATEMENT
78 year old female with hx of HTN, HLD, DM, "kidney issues", presents to the ED for right lower extremity swelling and pain x 2 months. Patient states she has pain with ambulation. Patient was evaluated in August 2023 and DVT studies were negative at that time. She denies recent travel, surgeries, hx of DVT/PE or clotting disorders, or cancer. Denies chest pain, sob, lightheadedness, dizziness, abdominal pain, nausea, vomiting. Daughter does states that patient has a mass in her abdomen for the last few months, which is being worked up outpatient. 78 year old female with hx of HTN, HLD, DM, "kidney issues", presents to the ED for right lower extremity swelling and pain x 2 months. Patient states she has pain with ambulation. Patient was evaluated in August 2023 and DVT studies were negative at that time. She denies recent travel, surgeries, hx of DVT/PE or clotting disorders, or cancer. Denies chest pain, sob, lightheadedness, dizziness, abdominal pain, nausea, vomiting. Daughter does states that patient has a mass in her abdomen for the last few months, which is being worked up outpatient. Denies urinary complaints at this time, no retention, frequency, dysuria, hematuria.

## 2023-10-17 NOTE — ED PROVIDER NOTE - ATTENDING CONTRIBUTION TO CARE
78-year-old female past medical history hypertension, hyperlipidemia, diabetes, chronic kidney disease bladder diverticulum, presents for right lower extremity swelling.  Denies any pain does report swelling makes it difficult to walk at times.  Denies any numbness or weakness.  Denies any injury.  Patient noted to have abdominal distention of uncertain etiology.  Patient evaluated by fever, chills, chest pain, shortness of breath, abdominal pain, nausea, vomiting, diarrhea, constipation.  Denies any dysuria hematuria.  Exam as above  Plan: Labs, CT, right lower extremity ultrasound, reassess.  Labs reviewed ultrasound negative for DVT.  Spoke with radiologist CT today is consistent with malignancy after initiation of IV contrast.  Severe right hydronephrosis unchanged from prior CT.  Discussed results with patient and daughter at bedside and expressed understanding.  Plan for GYN consult, reassess.

## 2023-10-17 NOTE — ED PROVIDER NOTE - CLINICAL SUMMARY MEDICAL DECISION MAKING FREE TEXT BOX
78 year old female presents with RLE edema and pain, will r/o DVT with duplex studies.   She also reports an abdominal mass and "kidney problems". Upon chart review, patient found to have 12 cm cystic structure in the pelvis which appears to be a right bladder diverticulum compressing the urinary bladder and appears to obstruct the right ureter causing right hydronephrosis and right hydroureter.   Will obtain CT abd/pel to further evaluate as well as labs and urine.

## 2023-10-17 NOTE — ED PROVIDER NOTE - PHYSICAL EXAMINATION
GEN: NAD, awake, eyes open spontaneously  EYES: normal conjunctiva, perrl  ENT: NCAT, MMM, Trachea midline  CHEST/LUNGS: Non-tachypneic, CTAB, bilateral breath sounds  CARDIAC: Non-tachycardic, normal perfusion  ABDOMEN: Right periumbilical mass palpated without tenderness, Soft, NTND, No rebound/guarding  MSK: 2+ RLE edema up to the groin, no gross deformity of extremities  SKIN: No rashes, no petechiae, no vesicles  NEURO: CN grossly intact, normal coordination, no focal motor or sensory deficits  PSYCH: Alert, appropriate, cooperative, with capacity and insight

## 2023-10-17 NOTE — ED ADULT NURSE NOTE - OBJECTIVE STATEMENT
received patient to intake for leg swelling. A7o4, ambulatory with assistance, c/o right leg swelling since August, noted to have +2 pitting edema, pt has pain when palpated. left 20G AC placed, labs sent, RR even and unlabored, no fall/trauma, denies chest pain, SOB.

## 2023-10-17 NOTE — ED PROVIDER NOTE - NS ED ROS FT
CONSTITUTIONAL: No fevers, no chills, no lightheadedness, no dizziness  EYES: no visual changes, no eye pain  EARS: no ear drainage, no ear pain, no change in hearing  NOSE: no nasal congestion  MOUTH/THROAT: no sore throat  CV: No chest pain, no palpitations  RESP: No SOB, no cough  GI: No n/v/d, no abd pain  : no dysuria, no hematuria, no flank pain  MSK: no back pain, + extremity pain, + leg swelling   SKIN: no rashes  NEURO: no headache, no focal weakness, no decreased sensation/parasthesias

## 2023-10-17 NOTE — ED PROVIDER NOTE - PROGRESS NOTE DETAILS
Sheryl Segovia,  (PGY-1): CT reveals an increase in size of pelvic mass with concern for malignancy. Ultrasound negative for DVT, leg swelling likely due to pressure from pelvic mass. Sheryl Segovia,  (PGY-1): CT reveals an increase in size of pelvic mass with concern for malignancy. Ultrasound negative for DVT, leg swelling likely due to pressure from pelvic mass. Patient and daughter aware. GYN paged. ZOË CINTRON: (late entry),pt signed out to me pending GYN eval. GYN Onc evaluated, will set up appt for outpaitent f/u regarding tumor markers. CT chest revealing incidental pulmonary nodules, aortic dilation. will f/u with her pcp.

## 2023-10-17 NOTE — ED PROVIDER NOTE - PATIENT PORTAL LINK FT
You can access the FollowMyHealth Patient Portal offered by [facility] by registering at the following website: [email or web address]. By joining [facility]’s FollowMyHealth portal, you will also be able to view your health information using other applications (apps) compatible with our system.

## 2023-10-17 NOTE — ED PROVIDER NOTE - NSICDXPASTMEDICALHX_GEN_ALL_CORE_FT
PAST MEDICAL HISTORY:  Diabetes     Essential hypertension     Hyperlipidemia     Thyroid goiter

## 2023-10-18 VITALS
TEMPERATURE: 98 F | RESPIRATION RATE: 15 BRPM | SYSTOLIC BLOOD PRESSURE: 151 MMHG | DIASTOLIC BLOOD PRESSURE: 90 MMHG | HEART RATE: 72 BPM | OXYGEN SATURATION: 99 %

## 2023-10-18 LAB
CANCER AG125 SERPL-ACNC: 10 U/ML — SIGNIFICANT CHANGE UP
CANCER AG125 SERPL-ACNC: 10 U/ML — SIGNIFICANT CHANGE UP
CANCER AG19-9 SERPL-ACNC: 137 U/ML — HIGH
CANCER AG19-9 SERPL-ACNC: 137 U/ML — HIGH
CEA SERPL-MCNC: 4.1 NG/ML — HIGH (ref 1–3.8)
CEA SERPL-MCNC: 4.1 NG/ML — HIGH (ref 1–3.8)

## 2023-10-18 PROCEDURE — 71250 CT THORAX DX C-: CPT | Mod: 26,MA

## 2023-10-18 NOTE — CONSULT NOTE ADULT - NSCONSULTADDITIONALINFOA_GEN_ALL_CORE
Gyn Onc Fellow Addendum    Discussed findings with R2.    77 yo with large pelvic mass c/f gyn malignancy p/w RLE edema     - VSS, labs notable for IRENE Cr 1.55 (however likely chronic, given similar to prior presentations; unknown baseline)  - Per patient, reports hx of TVH (patient unsure if she has had BSO)  - Followed by urology since July for presumed bladder diverticulum, however now with worsening enlargement of pelvic mass & adenopathy.   - CT A/P with 17.5cm fluid distended thick-walled structure posterior to bladder, 1.5cm left periaortic nodes & pelvic sidewall lymph nodes measuring up to 3.8cm. Also with severe hydroureteronephrosis 2/2 compression by pelvic mass (seen on prior CT imaging). No left hydronephrosis  - CT chest non con unremarkable  - LE dopplers neg  - Tumor markers pending  - Stable for discharge. Will arrange for outpatient follow up with Gyn Onc    Janeth Cabral MD  D/w Dr. Pepe, attending

## 2023-10-18 NOTE — CONSULT NOTE ADULT - SUBJECTIVE AND OBJECTIVE BOX
GYN Consult Note    77yo G_P_ with PMH significant for * presents with *  HPI:      OB Hx:  Gyn Hx:   Last Menstrual Period    Name of GYN Physician:  Date of Last Pap:  History of Abnormal Pap:  Date of Last Mammogram:  Date of Last Colonoscopy:  Current OCP use:     PAST MEDICAL & SURGICAL HISTORY:  Essential hypertension      Hyperlipidemia      Thyroid goiter      Diabetes      H/O: hysterectomy      H/O lumpectomy          MEDICATIONS  (STANDING):    MEDICATIONS  (PRN):      Allergies    No Known Allergies    Intolerances        SOCIAL HISTORY:    FAMILY HISTORY:      REVIEW OF SYSTEMS  General: denies fevers, chills, tiredness  Skin/Breast: denies breast pain  Respiratory and Thorax: denies shortness of breath, denies cough  Cardiovascular: denies chest pain and denies palpitations  Gastrointestinal: denies abdominal pain, nausea/ vomiting	  Genitourinary: denies dysuria, increased urinary frequency, urgency	  Constitutional, Cardiovascular, Respiratory, Gastrointestinal, Genitourinary, Musculoskeletal and Integumentary review of systems are normal except as noted. 	      Vital Signs Last 24 Hrs  T(C): 36.9 (17 Oct 2023 23:05), Max: 36.9 (17 Oct 2023 23:05)  T(F): 98.4 (17 Oct 2023 23:05), Max: 98.4 (17 Oct 2023 23:05)  HR: 88 (17 Oct 2023 23:05) (74 - 88)  BP: 148/78 (17 Oct 2023 23:05) (145/69 - 171/75)  BP(mean): --  RR: 17 (17 Oct 2023 23:05) (16 - 20)  SpO2: 99% (17 Oct 2023 23:05) (99% - 100%)    Parameters below as of 17 Oct 2023 23:05  Patient On (Oxygen Delivery Method): room air        PHYSICAL EXAM:   Gen: NAD   Cardiovascular: Clinically well perfused  Respiratory: Breathing comfortably on RA  Abd: Soft, non-tender, non-distended  Pelvic: Cervix - closed/long, no CMT; Uterus - normal size, non tender; Adnexa - non tender, no palpable masses  Extremities: Non-tender, non-edematous; able to move all ext equally  Neuro: AOx3      LABS:                        10.6   5.02  )-----------( 266      ( 17 Oct 2023 18:47 )             33.9     10-    140  |  104  |  28<H>  ----------------------------<  97  4.6   |  24  |  1.49<H>    Ca    10.0      17 Oct 2023 18:47  Phos  3.0     10-17  Mg     2.20     10-17    TPro  9.0<H>  /  Alb  4.7  /  TBili  0.3  /  DBili  x   /  AST  24  /  ALT  7   /  AlkPhos  67  10-17    PT/INR - ( 17 Oct 2023 18:47 )   PT: 11.2 sec;   INR: 0.99 ratio         PTT - ( 17 Oct 2023 18:47 )  PTT:31.4 sec  Urinalysis Basic - ( 17 Oct 2023 21:51 )    Color: Yellow / Appearance: Clear / S.022 / pH: x  Gluc: x / Ketone: Negative mg/dL  / Bili: Negative / Urobili: 0.2 mg/dL   Blood: x / Protein: Negative mg/dL / Nitrite: Negative   Leuk Esterase: Negative / RBC: 0 /HPF / WBC 0 /HPF   Sq Epi: x / Non Sq Epi: 0 /HPF / Bacteria: Negative /HPF        RADIOLOGY & ADDITIONAL STUDIES:   GYN Consult Note    79yo G_P_ with PMH significant for * presents with *  HPI:      OB Hx:  Gyn Hx:   Last Menstrual Period    Name of GYN Physician:  Date of Last Pap:  History of Abnormal Pap:  Date of Last Mammogram:  Date of Last Colonoscopy:  Current OCP use:     PAST MEDICAL & SURGICAL HISTORY:  Essential hypertension      Hyperlipidemia      Thyroid goiter      Diabetes      H/O: hysterectomy      H/O lumpectomy          MEDICATIONS  (STANDING):    MEDICATIONS  (PRN):      Allergies    No Known Allergies    Intolerances        SOCIAL HISTORY:    FAMILY HISTORY:      REVIEW OF SYSTEMS  General: denies fevers, chills, tiredness  Skin/Breast: denies breast pain  Respiratory and Thorax: denies shortness of breath, denies cough  Cardiovascular: denies chest pain and denies palpitations  Gastrointestinal: denies abdominal pain, nausea/ vomiting	  Genitourinary: denies dysuria, increased urinary frequency, urgency	  Constitutional, Cardiovascular, Respiratory, Gastrointestinal, Genitourinary, Musculoskeletal and Integumentary review of systems are normal except as noted. 	      Vital Signs Last 24 Hrs  T(C): 36.9 (17 Oct 2023 23:05), Max: 36.9 (17 Oct 2023 23:05)  T(F): 98.4 (17 Oct 2023 23:05), Max: 98.4 (17 Oct 2023 23:05)  HR: 88 (17 Oct 2023 23:05) (74 - 88)  BP: 148/78 (17 Oct 2023 23:05) (145/69 - 171/75)  BP(mean): --  RR: 17 (17 Oct 2023 23:05) (16 - 20)  SpO2: 99% (17 Oct 2023 23:05) (99% - 100%)    Parameters below as of 17 Oct 2023 23:05  Patient On (Oxygen Delivery Method): room air        PHYSICAL EXAM:   Gen: NAD   Cardiovascular: Clinically well perfused  Respiratory: Breathing comfortably on RA  Abd: Soft, non-tender, non-distended  Pelvic: Cervix - closed/long, no CMT; Uterus - normal size, non tender; Adnexa - non tender, no palpable masses  Extremities: Non-tender, non-edematous; able to move all ext equally  Neuro: AOx3      LABS:                        10.6   5.02  )-----------( 266      ( 17 Oct 2023 18:47 )             33.9     10-    140  |  104  |  28<H>  ----------------------------<  97  4.6   |  24  |  1.49<H>    Ca    10.0      17 Oct 2023 18:47  Phos  3.0     10-17  Mg     2.20     10-17    TPro  9.0<H>  /  Alb  4.7  /  TBili  0.3  /  DBili  x   /  AST  24  /  ALT  7   /  AlkPhos  67  10-17    PT/INR - ( 17 Oct 2023 18:47 )   PT: 11.2 sec;   INR: 0.99 ratio         PTT - ( 17 Oct 2023 18:47 )  PTT:31.4 sec  Urinalysis Basic - ( 17 Oct 2023 21:51 )    Color: Yellow / Appearance: Clear / S.022 / pH: x  Gluc: x / Ketone: Negative mg/dL  / Bili: Negative / Urobili: 0.2 mg/dL   Blood: x / Protein: Negative mg/dL / Nitrite: Negative   Leuk Esterase: Negative / RBC: 0 /HPF / WBC 0 /HPF   Sq Epi: x / Non Sq Epi: 0 /HPF / Bacteria: Negative /HPF        RADIOLOGY & ADDITIONAL STUDIES:   GYN Consult Note    77yo postmenopausal P5 with PMSHx HTN, HLD, DM, ?fibroids s/p TVH (+/- BSO, patient unsure), presenting with right LE edema and pain with ambulation x3 months. Edema first started in July. Per chart, patient was evaluated in 2023 and DVT studies were negative at that time. Today patient also mentions current outpatient workup for pelvic "mass." Notably, patient was admitted to the hospital - s/p mechanical fall at home. At that time patient was also diagnosed with renal dysfunction and pelvic (gyn vs bladder) mass. During admission patient had creatinine 1.54 (unknown baseline) and urinary retention. Urology was consulted. Patient has nephrologist in [place] who she followed with for ?mass. Kidney ultrasound  showed moderate right hydronephrosis and a mildly distended bladder with a large right diverticulum along with debris in the collecting system. After discharge patient followed with urology outpatient.    UROLOGY course below:  : Non-Con CT ordere  REPRO: Uterus is not clearly identified and may have been surgically removed  RETROPERITONEUM/LYMPH NODES: No lymphadenopathy.  IMPRESSION: 12 cm cystic structure in the pelvis which appears to be a right bladder diverticulum which is located superior to the urinary bladder and compresses the urinary bladder and appears to obstruct the right ureter causing right hydronephrosis and right hydroureter.    : Renal lasix scan on  with minimal flow to and function of the right kidney. Function is so poor that it is not possible to determine the likelihood of obstruction. Normal flow to and function of the left kidney with no evidence of obstruction. Differential renal function: Right kidney: 4%; Left kidney: 96%. Results reviewed with patient. CT reviewed redemonstrating 12cm diverticulum obstructing the right kidney causing loss of function over time. Vaginal exam with no signs of malignancy or mechanical obstruction. Otherwise patient feels comfortable, has been voiding well, no complaints.    GYn consulted for pelvic pass re-demonstrated on CT in ED. Patient reports she was told about a bladder "pouch"/diverticulum, but that the ED today notified her the CT shows a pelvic mass. She endorses constipation since a few months ago, about 1-2 bowel movements per week (last bowel movement last week). Endorses decreased appetite, but no nausea/vomiting. Denies fevers/chills, abdominal pain, CP, SOB. Denies urinary complaints at this time, no retention, frequency, dysuria, hematuria.    Ob/Gyn: Does not have; referred to Ob/Gyn by Urology  ObHx:  x5  GYNHx: s/p TVH  at [facility] d/t ?fibroids (patient reports benign pathology), unsure if s/p BSO as well  PMHx: HTN, HLD, DM2, renal dysfunction, thyroid goiter  PSHx: s/p L breast lumpectomy (benign per patient), TVH  Meds: Simvastatin, HCTZ, Metformin, unsure of others  All: Denies  Family Hx: Denies hx cancer    Per chart review,   patient reports colonoscopy in , wnl  patient reports recent mammogram, wnl      Vital Signs Last 24 Hrs  T(C): 36.9 (17 Oct 2023 23:05), Max: 36.9 (17 Oct 2023 23:05)  T(F): 98.4 (17 Oct 2023 23:05), Max: 98.4 (17 Oct 2023 23:05)  HR: 88 (17 Oct 2023 23:05) (74 - 88)  BP: 148/78 (17 Oct 2023 23:05) (145/69 - 171/75)  BP(mean): --  RR: 17 (17 Oct 2023 23:05) (16 - 20)  SpO2: 99% (17 Oct 2023 23:05) (99% - 100%)    Parameters below as of 17 Oct 2023 23:05  Patient On (Oxygen Delivery Method): room air        PHYSICAL EXAM:   Gen: NAD   Cardiovascular: Clinically well perfused  Respiratory: Breathing comfortably on RA  Abd: Soft with firm mass above umbilicus, nontender, no rebound/guarding  Pelvic: On speculum, atrophic vagina with cuff intact; On bimanual cuff intact, large mass 24 wk size  Extremities: 2+ pitting edema of R LE up to thigh, mild TTP  Neuro: AOx3      LABS:                        10.6   5.02  )-----------( 266      ( 17 Oct 2023 18:47 )             33.9     10-    140  |  104  |  28<H>  ----------------------------<  97  4.6   |  24  |  1.49<H>    Ca    10.0      17 Oct 2023 18:47  Phos  3.0     10-17  Mg     2.20     10-17    TPro  9.0<H>  /  Alb  4.7  /  TBili  0.3  /  DBili  x   /  AST  24  /  ALT  7   /  AlkPhos  67  10-    PT/INR - ( 17 Oct 2023 18:47 )   PT: 11.2 sec;   INR: 0.99 ratio         PTT - ( 17 Oct 2023 18:47 )  PTT:31.4 sec  Urinalysis Basic - ( 17 Oct 2023 21:51 )    Color: Yellow / Appearance: Clear / S.022 / pH: x  Gluc: x / Ketone: Negative mg/dL  / Bili: Negative / Urobili: 0.2 mg/dL   Blood: x / Protein: Negative mg/dL / Nitrite: Negative   Leuk Esterase: Negative / RBC: 0 /HPF / WBC 0 /HPF   Sq Epi: x / Non Sq Epi: 0 /HPF / Bacteria: Negative /HPF        RADIOLOGY & ADDITIONAL STUDIES:  < from: CT Abdomen and Pelvis w/ IV Cont (10.17.23 @ 19:12) >  ACC: [id number] EXAM:  CT ABDOMEN AND PELVIS IC   ORDERED BY: MARGARITA BOTELLO     PROCEDURE DATE:  10/17/2023          INTERPRETATION:  CLINICAL INFORMATION: Bladder diverticulum. Reported   history of salpingoopherectomy and hysterectomy    COMPARISON: 2023    CONTRAST/COMPLICATIONS:  IV Contrast: Omnipaque 350  90 cc administered   10 cc discarded  Oral Contrast: NONE  Complications: None reported at time of study completion    PROCEDURE:  CT of the Abdomen and Pelvis was performed.  Sagittal and coronal reformats were performed.    FINDINGS:  LOWER CHEST: Within normal limits.    LIVER: Within normal limits.  BILE DUCTS: Normal caliber.  GALLBLADDER: Within normal limits.  SPLEEN: Within normal limits.  PANCREAS: Within normal limits.  ADRENALS: Within normal limits.  KIDNEYS/URETERS: Similar severe right hydroureteronephrosis to the level   of the distal ureter secondary to compression by the large pelvic fluid   structure. right renal cortical thinning. No left hydronephrosis.  Bilateral cysts and other hypoattenuating foci too small to accurately   characterize.    BLADDER/REPRODUCTIVE ORGANS: 17.5 cm x 9.6 cm x 10.6 cm fluid distended,   thick-walled structure posterior to the bladder (previously 16.5 cm x 9.0   cm x 10.7 cm).    BOWEL: No bowel obstruction.  PERITONEUM: No ascites.  VESSELS: Atherosclerotic changes.  RETROPERITONEUM/LYMPH NODES: Lymphadenopathy as follows:  Right pelvic sidewall measuring 3.8 cm x 1.9 cm (301:78).  Left pelvic sidewall measuring 2.3 cm x 1.5 cm (301:73).  Left periaortic measuring 1.5 cm (301:44).    ABDOMINAL WALL: Within normal limits.  BONES: Degenerative changes. Grade 2 anterolisthesis of L4 on L5, similar   to prior.    IMPRESSION:  Increase in size of thick-walled, fluiddistended pelvic structure.   Pelvic and periaortic lymphadenopathy, concerning for pelvic malignancy.   Taken with reported history of hysterectomy, this is concerning for a   gynecologic malignancy. Pelvic MRI would be helpful for further   evaluation.    Similar severe right hydroureteronephrosis to the level of the distal   ureter secondary to compression by the large pelvic fluid structure.    Findings were discussed with Dr. Carlos Alberto Farr 10/17/2023 8:09 PM by   Dr. Eaton with read back confirmation.    --- End of Report ---    < end of copied text >   GYN Consult Note    79yo postmenopausal P5 with PMSHx HTN, HLD, DM, ?fibroids s/p TVH (+/- BSO, patient unsure), presenting with right LE edema and pain with ambulation x3 months. Edema first started in July. Per chart, patient was evaluated in 2023 and DVT studies were negative at that time. Today patient also mentions current outpatient workup for pelvic "mass." Notably, patient was admitted to the hospital - s/p mechanical fall at home. At that time patient was also diagnosed with renal dysfunction and pelvic (gyn vs bladder) mass. During admission patient had creatinine 1.54 (unknown baseline) and urinary retention. Urology was consulted. Patient has nephrologist in [place] who she followed with for ?mass. Kidney ultrasound  showed moderate right hydronephrosis and a mildly distended bladder with a large right diverticulum along with debris in the collecting system. After discharge patient followed with urology outpatient.    UROLOGY course below:  : Non-Con CT ordere  REPRO: Uterus is not clearly identified and may have been surgically removed  RETROPERITONEUM/LYMPH NODES: No lymphadenopathy.  IMPRESSION: 12 cm cystic structure in the pelvis which appears to be a right bladder diverticulum which is located superior to the urinary bladder and compresses the urinary bladder and appears to obstruct the right ureter causing right hydronephrosis and right hydroureter.    : Renal lasix scan on  with minimal flow to and function of the right kidney. Function is so poor that it is not possible to determine the likelihood of obstruction. Normal flow to and function of the left kidney with no evidence of obstruction. Differential renal function: Right kidney: 4%; Left kidney: 96%. Results reviewed with patient. CT reviewed redemonstrating 12cm diverticulum obstructing the right kidney causing loss of function over time. Vaginal exam with no signs of malignancy or mechanical obstruction. Otherwise patient feels comfortable, has been voiding well, no complaints.    GYn consulted for pelvic pass re-demonstrated on CT in ED. Patient reports she was told about a bladder "pouch"/diverticulum, but that the ED today notified her the CT shows a pelvic mass. She endorses constipation since a few months ago, about 1-2 bowel movements per week (last bowel movement last week). Endorses decreased appetite, but no nausea/vomiting. Denies fevers/chills, abdominal pain, CP, SOB. Denies urinary complaints at this time, no retention, frequency, dysuria, hematuria.    Ob/Gyn: Does not have; referred to Ob/Gyn by Urology  ObHx:  x5  GYNHx: s/p TVH  at [facility] d/t ?fibroids (patient reports benign pathology), unsure if s/p BSO as well  PMHx: HTN, HLD, DM2, renal dysfunction, thyroid goiter  PSHx: s/p L breast lumpectomy (benign per patient), TVH  Meds: Simvastatin, HCTZ, Metformin, unsure of others  All: Denies  Family Hx: Denies hx cancer    Per chart review,   patient reports colonoscopy in , wnl  patient reports recent mammogram, wnl      Vital Signs Last 24 Hrs  T(C): 36.9 (17 Oct 2023 23:05), Max: 36.9 (17 Oct 2023 23:05)  T(F): 98.4 (17 Oct 2023 23:05), Max: 98.4 (17 Oct 2023 23:05)  HR: 88 (17 Oct 2023 23:05) (74 - 88)  BP: 148/78 (17 Oct 2023 23:05) (145/69 - 171/75)  BP(mean): --  RR: 17 (17 Oct 2023 23:05) (16 - 20)  SpO2: 99% (17 Oct 2023 23:05) (99% - 100%)    Parameters below as of 17 Oct 2023 23:05  Patient On (Oxygen Delivery Method): room air        PHYSICAL EXAM:   Gen: NAD   Cardiovascular: Clinically well perfused  Respiratory: Breathing comfortably on RA  Abd: Soft with firm mass above umbilicus, nontender, no rebound/guarding  Pelvic: On speculum, atrophic vagina with cuff intact; On bimanual cuff intact, large mass 24 wk size  Extremities: 2+ pitting edema of R LE up to thigh, mild TTP  Neuro: AOx3      LABS:                        10.6   5.02  )-----------( 266      ( 17 Oct 2023 18:47 )             33.9     10-    140  |  104  |  28<H>  ----------------------------<  97  4.6   |  24  |  1.49<H>    Ca    10.0      17 Oct 2023 18:47  Phos  3.0     10-17  Mg     2.20     10-17    TPro  9.0<H>  /  Alb  4.7  /  TBili  0.3  /  DBili  x   /  AST  24  /  ALT  7   /  AlkPhos  67  10-    PT/INR - ( 17 Oct 2023 18:47 )   PT: 11.2 sec;   INR: 0.99 ratio         PTT - ( 17 Oct 2023 18:47 )  PTT:31.4 sec  Urinalysis Basic - ( 17 Oct 2023 21:51 )    Color: Yellow / Appearance: Clear / S.022 / pH: x  Gluc: x / Ketone: Negative mg/dL  / Bili: Negative / Urobili: 0.2 mg/dL   Blood: x / Protein: Negative mg/dL / Nitrite: Negative   Leuk Esterase: Negative / RBC: 0 /HPF / WBC 0 /HPF   Sq Epi: x / Non Sq Epi: 0 /HPF / Bacteria: Negative /HPF        RADIOLOGY & ADDITIONAL STUDIES:  < from: CT Abdomen and Pelvis w/ IV Cont (10.17.23 @ 19:12) >  ACC: [id number] EXAM:  CT ABDOMEN AND PELVIS IC   ORDERED BY: MARGARITA BOTELLO     PROCEDURE DATE:  10/17/2023          INTERPRETATION:  CLINICAL INFORMATION: Bladder diverticulum. Reported   history of salpingoopherectomy and hysterectomy    COMPARISON: 2023    CONTRAST/COMPLICATIONS:  IV Contrast: Omnipaque 350  90 cc administered   10 cc discarded  Oral Contrast: NONE  Complications: None reported at time of study completion    PROCEDURE:  CT of the Abdomen and Pelvis was performed.  Sagittal and coronal reformats were performed.    FINDINGS:  LOWER CHEST: Within normal limits.    LIVER: Within normal limits.  BILE DUCTS: Normal caliber.  GALLBLADDER: Within normal limits.  SPLEEN: Within normal limits.  PANCREAS: Within normal limits.  ADRENALS: Within normal limits.  KIDNEYS/URETERS: Similar severe right hydroureteronephrosis to the level   of the distal ureter secondary to compression by the large pelvic fluid   structure. right renal cortical thinning. No left hydronephrosis.  Bilateral cysts and other hypoattenuating foci too small to accurately   characterize.    BLADDER/REPRODUCTIVE ORGANS: 17.5 cm x 9.6 cm x 10.6 cm fluid distended,   thick-walled structure posterior to the bladder (previously 16.5 cm x 9.0   cm x 10.7 cm).    BOWEL: No bowel obstruction.  PERITONEUM: No ascites.  VESSELS: Atherosclerotic changes.  RETROPERITONEUM/LYMPH NODES: Lymphadenopathy as follows:  Right pelvic sidewall measuring 3.8 cm x 1.9 cm (301:78).  Left pelvic sidewall measuring 2.3 cm x 1.5 cm (301:73).  Left periaortic measuring 1.5 cm (301:44).    ABDOMINAL WALL: Within normal limits.  BONES: Degenerative changes. Grade 2 anterolisthesis of L4 on L5, similar   to prior.    IMPRESSION:  Increase in size of thick-walled, fluiddistended pelvic structure.   Pelvic and periaortic lymphadenopathy, concerning for pelvic malignancy.   Taken with reported history of hysterectomy, this is concerning for a   gynecologic malignancy. Pelvic MRI would be helpful for further   evaluation.    Similar severe right hydroureteronephrosis to the level of the distal   ureter secondary to compression by the large pelvic fluid structure.    Findings were discussed with Dr. Carlos Alberto Farr 10/17/2023 8:09 PM by   Dr. Eaton with read back confirmation.    --- End of Report ---    < end of copied text >   GYN Consult Note    79yo postmenopausal P5 with PMSHx HTN, HLD, DM, ?fibroids s/p TVH (+/- BSO, patient unsure), presenting with right LE edema and pain with ambulation x3 months. Edema first started in July. Per chart, patient was evaluated in 2023 and DVT studies were negative at that time. Today patient also mentions current outpatient workup for pelvic "mass." Notably, patient was admitted to the hospital - s/p mechanical fall at home. At that time patient was also diagnosed with renal dysfunction and pelvic (gyn vs bladder) mass. During admission patient had creatinine 1.54 (unknown baseline) and urinary retention. Urology was consulted. Patient has nephrologist in [place] who she followed with for ?mass. Kidney ultrasound  showed moderate right hydronephrosis and a mildly distended bladder with a large right diverticulum along with debris in the collecting system. After discharge patient followed with urology outpatient.    UROLOGY course below:  : Non-Con CT ordere  REPRO: Uterus is not clearly identified and may have been surgically removed  RETROPERITONEUM/LYMPH NODES: No lymphadenopathy.  IMPRESSION: 12 cm cystic structure in the pelvis which appears to be a right bladder diverticulum which is located superior to the urinary bladder and compresses the urinary bladder and appears to obstruct the right ureter causing right hydronephrosis and right hydroureter.    : Renal lasix scan on  with minimal flow to and function of the right kidney. Function is so poor that it is not possible to determine the likelihood of obstruction. Normal flow to and function of the left kidney with no evidence of obstruction. Differential renal function: Right kidney: 4%; Left kidney: 96%. Results reviewed with patient. CT reviewed redemonstrating 12cm diverticulum obstructing the right kidney causing loss of function over time. Vaginal exam with no signs of malignancy or mechanical obstruction. Otherwise patient feels comfortable, has been voiding well, no complaints.    GYn consulted for pelvic pass re-demonstrated on CT in ED. Patient reports she was told about a bladder "pouch"/diverticulum, but that the ED today notified her the CT shows a pelvic mass. She endorses constipation since a few months ago, about 1-2 bowel movements per week (last bowel movement last week). Endorses decreased appetite, but no nausea/vomiting. Denies fevers/chills, abdominal pain, CP, SOB. Denies urinary complaints at this time, no retention, frequency, dysuria, hematuria.    Ob/Gyn: Does not have; referred to Ob/Gyn by Urology  ObHx:  x5  GYNHx: s/p TVH  at [facility] d/t ?fibroids (patient reports benign pathology), unsure if s/p BSO as well  PMHx: HTN, HLD, DM2, renal dysfunction, thyroid goiter  PSHx: s/p L breast lumpectomy (benign per patient), TVH  Meds: Simvastatin, HCTZ, Metformin, unsure of others  All: Denies  Family Hx: Denies hx cancer    Per chart review,   patient reports colonoscopy in , wnl  patient reports recent mammogram, wnl      Vital Signs Last 24 Hrs  T(C): 36.9 (17 Oct 2023 23:05), Max: 36.9 (17 Oct 2023 23:05)  T(F): 98.4 (17 Oct 2023 23:05), Max: 98.4 (17 Oct 2023 23:05)  HR: 88 (17 Oct 2023 23:05) (74 - 88)  BP: 148/78 (17 Oct 2023 23:05) (145/69 - 171/75)  BP(mean): --  RR: 17 (17 Oct 2023 23:05) (16 - 20)  SpO2: 99% (17 Oct 2023 23:05) (99% - 100%)    Parameters below as of 17 Oct 2023 23:05  Patient On (Oxygen Delivery Method): room air        PHYSICAL EXAM:   Gen: NAD   Cardiovascular: Clinically well perfused  Respiratory: Breathing comfortably on RA  Abd: Soft with firm mass above umbilicus, nontender, no rebound/guarding  Pelvic: On speculum, atrophic vagina with cuff intact; On bimanual cuff intact, large mass 24 wk size  Extremities: 2+ pitting edema of R LE up to thigh, mild TTP  Neuro: AOx3      LABS:                        10.6   5.02  )-----------( 266      ( 17 Oct 2023 18:47 )             33.9     10-    140  |  104  |  28<H>  ----------------------------<  97  4.6   |  24  |  1.49<H>    Ca    10.0      17 Oct 2023 18:47  Phos  3.0     10-17  Mg     2.20     10-17    TPro  9.0<H>  /  Alb  4.7  /  TBili  0.3  /  DBili  x   /  AST  24  /  ALT  7   /  AlkPhos  67  10-    PT/INR - ( 17 Oct 2023 18:47 )   PT: 11.2 sec;   INR: 0.99 ratio         PTT - ( 17 Oct 2023 18:47 )  PTT:31.4 sec  Urinalysis Basic - ( 17 Oct 2023 21:51 )    Color: Yellow / Appearance: Clear / S.022 / pH: x  Gluc: x / Ketone: Negative mg/dL  / Bili: Negative / Urobili: 0.2 mg/dL   Blood: x / Protein: Negative mg/dL / Nitrite: Negative   Leuk Esterase: Negative / RBC: 0 /HPF / WBC 0 /HPF   Sq Epi: x / Non Sq Epi: 0 /HPF / Bacteria: Negative /HPF        RADIOLOGY & ADDITIONAL STUDIES:  < from: CT Abdomen and Pelvis w/ IV Cont (10.17.23 @ 19:12) >  ACC: [id number] EXAM:  CT ABDOMEN AND PELVIS IC   ORDERED BY: MARGARITA BOTELLO     PROCEDURE DATE:  10/17/2023          INTERPRETATION:  CLINICAL INFORMATION: Bladder diverticulum. Reported   history of salpingoopherectomy and hysterectomy    COMPARISON: 2023    CONTRAST/COMPLICATIONS:  IV Contrast: Omnipaque 350  90 cc administered   10 cc discarded  Oral Contrast: NONE  Complications: None reported at time of study completion    PROCEDURE:  CT of the Abdomen and Pelvis was performed.  Sagittal and coronal reformats were performed.    FINDINGS:  LOWER CHEST: Within normal limits.    LIVER: Within normal limits.  BILE DUCTS: Normal caliber.  GALLBLADDER: Within normal limits.  SPLEEN: Within normal limits.  PANCREAS: Within normal limits.  ADRENALS: Within normal limits.  KIDNEYS/URETERS: Similar severe right hydroureteronephrosis to the level   of the distal ureter secondary to compression by the large pelvic fluid   structure. right renal cortical thinning. No left hydronephrosis.  Bilateral cysts and other hypoattenuating foci too small to accurately   characterize.    BLADDER/REPRODUCTIVE ORGANS: 17.5 cm x 9.6 cm x 10.6 cm fluid distended,   thick-walled structure posterior to the bladder (previously 16.5 cm x 9.0   cm x 10.7 cm).    BOWEL: No bowel obstruction.  PERITONEUM: No ascites.  VESSELS: Atherosclerotic changes.  RETROPERITONEUM/LYMPH NODES: Lymphadenopathy as follows:  Right pelvic sidewall measuring 3.8 cm x 1.9 cm (301:78).  Left pelvic sidewall measuring 2.3 cm x 1.5 cm (301:73).  Left periaortic measuring 1.5 cm (301:44).    ABDOMINAL WALL: Within normal limits.  BONES: Degenerative changes. Grade 2 anterolisthesis of L4 on L5, similar   to prior.    IMPRESSION:  Increase in size of thick-walled, fluiddistended pelvic structure.   Pelvic and periaortic lymphadenopathy, concerning for pelvic malignancy.   Taken with reported history of hysterectomy, this is concerning for a   gynecologic malignancy. Pelvic MRI would be helpful for further   evaluation.    Similar severe right hydroureteronephrosis to the level of the distal   ureter secondary to compression by the large pelvic fluid structure.    Findings were discussed with Dr. Carlos Alberto Farr 10/17/2023 8:09 PM by   Dr. Eaton with read back confirmation.    --- End of Report ---    < end of copied text >   GYN ONC Consult Note    79yo postmenopausal P5 with PMSHx HTN, HLD, DM, ?fibroids s/p TVH (+/- BSO, patient unsure), presenting with right LE edema and pain with ambulation x3 months. Edema first started in July. Per chart, patient was evaluated in 2023 and DVT studies were negative at that time. Today patient also mentions current outpatient workup for pelvic "mass." Notably, patient was admitted to the hospital - s/p mechanical fall at home. At that time patient was also diagnosed with renal dysfunction and pelvic (gyn vs bladder) mass. During admission patient had creatinine 1.54 (unknown baseline) and urinary retention. Urology was consulted. Patient has nephrologist in [place] who she followed with for ?mass. Kidney ultrasound  showed moderate right hydronephrosis and a mildly distended bladder with a large right diverticulum along with debris in the collecting system. After discharge patient followed with urology outpatient.    UROLOGY course below:  : Non-Con CT  REPRO: Uterus is not clearly identified and may have been surgically removed  RETROPERITONEUM/LYMPH NODES: No lymphadenopathy.  IMPRESSION: 12 cm cystic structure in the pelvis which appears to be a right bladder diverticulum which is located superior to the urinary bladder and compresses the urinary bladder and appears to obstruct the right ureter causing right hydronephrosis and right hydroureter.    : Renal lasix scan on  with minimal flow to and function of the right kidney. Function is so poor that it is not possible to determine the likelihood of obstruction. Normal flow to and function of the left kidney with no evidence of obstruction. Differential renal function: Right kidney: 4%; Left kidney: 96%. Results reviewed with patient. CT reviewed redemonstrating 12cm diverticulum obstructing the right kidney causing loss of function over time. Vaginal exam with no signs of malignancy or mechanical obstruction. Otherwise patient feels comfortable, has been voiding well, no complaints.    GYn consulted for pelvic pass re-demonstrated on CT in ED. Patient reports she was told about a bladder "pouch"/diverticulum, but that the ED today notified her the CT shows a pelvic mass. She endorses constipation since a few months ago, about 1-2 bowel movements per week (last bowel movement last week). Endorses decreased appetite, but no nausea/vomiting. Reports 15 lb weight loss over last 6 months. Denies fevers/chills, abdominal pain, CP, SOB. Denies urinary complaints at this time, no retention, frequency, dysuria, hematuria.    Ob/Gyn: Does not have; referred to Ob/Gyn by Urology  ObHx:  x5  GYNHx: s/p TVH  at [facility] d/t ?fibroids (patient reports benign pathology), unsure if s/p BSO as well  PMHx: HTN, HLD, DM2, renal dysfunction, thyroid goiter  PSHx: s/p L breast lumpectomy (benign per patient), TVH  Meds: Simvastatin, HCTZ, Metformin, unsure of others  All: Denies  Family Hx: Denies hx cancer    Health Maintenance:   patient reports last colonoscopy 8 years ago  patient reports last mammogram       Vital Signs Last 24 Hrs  T(C): 36.9 (17 Oct 2023 23:05), Max: 36.9 (17 Oct 2023 23:05)  T(F): 98.4 (17 Oct 2023 23:05), Max: 98.4 (17 Oct 2023 23:05)  HR: 88 (17 Oct 2023 23:05) (74 - 88)  BP: 148/78 (17 Oct 2023 23:05) (145/69 - 171/75)  BP(mean): --  RR: 17 (17 Oct 2023 23:05) (16 - 20)  SpO2: 99% (17 Oct 2023 23:05) (99% - 100%)    Parameters below as of 17 Oct 2023 23:05  Patient On (Oxygen Delivery Method): room air        PHYSICAL EXAM:   Gen: NAD   Cardiovascular: Clinically well perfused  Respiratory: Breathing comfortably on RA  Abd: Soft with firm mass above umbilicus, nontender, no rebound/guarding  Pelvic: On speculum, atrophic vagina with cuff intact; On bimanual cuff intact, large mass 24 wk size  Extremities: 2+ pitting edema of R LE up to thigh, mild TTP  Neuro: AOx3      LABS:                        10.6   5.02  )-----------( 266      ( 17 Oct 2023 18:47 )             33.9     10-    140  |  104  |  28<H>  ----------------------------<  97  4.6   |  24  |  1.49<H>    Ca    10.0      17 Oct 2023 18:47  Phos  3.0     10-17  Mg     2.20     10-17    TPro  9.0<H>  /  Alb  4.7  /  TBili  0.3  /  DBili  x   /  AST  24  /  ALT  7   /  AlkPhos  67  10-    PT/INR - ( 17 Oct 2023 18:47 )   PT: 11.2 sec;   INR: 0.99 ratio         PTT - ( 17 Oct 2023 18:47 )  PTT:31.4 sec  Urinalysis Basic - ( 17 Oct 2023 21:51 )    Color: Yellow / Appearance: Clear / S.022 / pH: x  Gluc: x / Ketone: Negative mg/dL  / Bili: Negative / Urobili: 0.2 mg/dL   Blood: x / Protein: Negative mg/dL / Nitrite: Negative   Leuk Esterase: Negative / RBC: 0 /HPF / WBC 0 /HPF   Sq Epi: x / Non Sq Epi: 0 /HPF / Bacteria: Negative /HPF        RADIOLOGY & ADDITIONAL STUDIES:  < from: CT Abdomen and Pelvis w/ IV Cont (10.17.23 @ 19:12) >  ACC: [id number] EXAM:  CT ABDOMEN AND PELVIS IC   ORDERED BY: MARGARITA BOTELLO     PROCEDURE DATE:  10/17/2023          INTERPRETATION:  CLINICAL INFORMATION: Bladder diverticulum. Reported   history of salpingoopherectomy and hysterectomy    COMPARISON: 2023    CONTRAST/COMPLICATIONS:  IV Contrast: Omnipaque 350  90 cc administered   10 cc discarded  Oral Contrast: NONE  Complications: None reported at time of study completion    PROCEDURE:  CT of the Abdomen and Pelvis was performed.  Sagittal and coronal reformats were performed.    FINDINGS:  LOWER CHEST: Within normal limits.    LIVER: Within normal limits.  BILE DUCTS: Normal caliber.  GALLBLADDER: Within normal limits.  SPLEEN: Within normal limits.  PANCREAS: Within normal limits.  ADRENALS: Within normal limits.  KIDNEYS/URETERS: Similar severe right hydroureteronephrosis to the level   of the distal ureter secondary to compression by the large pelvic fluid   structure. right renal cortical thinning. No left hydronephrosis.  Bilateral cysts and other hypoattenuating foci too small to accurately   characterize.    BLADDER/REPRODUCTIVE ORGANS: 17.5 cm x 9.6 cm x 10.6 cm fluid distended,   thick-walled structure posterior to the bladder (previously 16.5 cm x 9.0   cm x 10.7 cm).    BOWEL: No bowel obstruction.  PERITONEUM: No ascites.  VESSELS: Atherosclerotic changes.  RETROPERITONEUM/LYMPH NODES: Lymphadenopathy as follows:  Right pelvic sidewall measuring 3.8 cm x 1.9 cm (301:78).  Left pelvic sidewall measuring 2.3 cm x 1.5 cm (301:73).  Left periaortic measuring 1.5 cm (301:44).    ABDOMINAL WALL: Within normal limits.  BONES: Degenerative changes. Grade 2 anterolisthesis of L4 on L5, similar   to prior.    IMPRESSION:  Increase in size of thick-walled, fluiddistended pelvic structure.   Pelvic and periaortic lymphadenopathy, concerning for pelvic malignancy.   Taken with reported history of hysterectomy, this is concerning for a   gynecologic malignancy. Pelvic MRI would be helpful for further   evaluation.    Similar severe right hydroureteronephrosis to the level of the distal   ureter secondary to compression by the large pelvic fluid structure.    Findings were discussed with Dr. Carlos Alberto Farr 10/17/2023 8:09 PM by   Dr. Eaton with read back confirmation.    --- End of Report ---    < end of copied text >   GYN ONC Consult Note    79yo postmenopausal P5 with PMSHx HTN, HLD, DM, ?fibroids s/p TVH (+/- BSO, patient unsure), presenting with right LE edema and pain with ambulation x3 months. Edema first started in July. Per chart, patient was evaluated in 2023 and DVT studies were negative at that time. Today patient also mentions current outpatient workup for pelvic "mass." Notably, patient was admitted to the hospital - s/p mechanical fall at home. At that time patient was also diagnosed with renal dysfunction and pelvic (gyn vs bladder) mass. During admission patient had creatinine 1.54 (unknown baseline) and urinary retention. Urology was consulted. Patient has nephrologist in [place] who she followed with for ?mass. Kidney ultrasound  showed moderate right hydronephrosis and a mildly distended bladder with a large right diverticulum along with debris in the collecting system. After discharge patient followed with urology outpatient.    UROLOGY course below:  : Non-Con CT  REPRO: Uterus is not clearly identified and may have been surgically removed  RETROPERITONEUM/LYMPH NODES: No lymphadenopathy.  IMPRESSION: 12 cm cystic structure in the pelvis which appears to be a right bladder diverticulum which is located superior to the urinary bladder and compresses the urinary bladder and appears to obstruct the right ureter causing right hydronephrosis and right hydroureter.    : Renal lasix scan on  with minimal flow to and function of the right kidney. Function is so poor that it is not possible to determine the likelihood of obstruction. Normal flow to and function of the left kidney with no evidence of obstruction. Differential renal function: Right kidney: 4%; Left kidney: 96%. Results reviewed with patient. CT reviewed redemonstrating 12cm diverticulum obstructing the right kidney causing loss of function over time. Vaginal exam with no signs of malignancy or mechanical obstruction. Otherwise patient feels comfortable, has been voiding well, no complaints.    GYn consulted for pelvic pass re-demonstrated on CT in ED. Patient reports she was told about a bladder "pouch"/diverticulum, but that the ED today notified her the CT shows a pelvic mass. She endorses constipation since a few months ago, about 1-2 bowel movements per week (last bowel movement last week). Endorses decreased appetite, but no nausea/vomiting. Reports 15 lb weight loss over last 6 months. Denies fevers/chills, abdominal pain, CP, SOB. Denies urinary complaints at this time, no retention, frequency, dysuria, hematuria.    Ob/Gyn: Does not have; referred to Ob/Gyn by Urology  ObHx:  x5  GYNHx: s/p TVH  at [facility] d/t ?fibroids (patient reports benign pathology), unsure if s/p BSO as well  PMHx: HTN, HLD, DM2, renal dysfunction, thyroid goiter  PSHx: s/p L breast lumpectomy (benign per patient), TVH  Meds: Simvastatin, HCTZ, Metformin, unsure of others  All: Denies  Family Hx: Denies hx cancer    Health Maintenance:   patient reports last colonoscopy 8 years ago  patient reports last mammogram       Vital Signs Last 24 Hrs  T(C): 36.9 (17 Oct 2023 23:05), Max: 36.9 (17 Oct 2023 23:05)  T(F): 98.4 (17 Oct 2023 23:05), Max: 98.4 (17 Oct 2023 23:05)  HR: 88 (17 Oct 2023 23:05) (74 - 88)  BP: 148/78 (17 Oct 2023 23:05) (145/69 - 171/75)  BP(mean): --  RR: 17 (17 Oct 2023 23:05) (16 - 20)  SpO2: 99% (17 Oct 2023 23:05) (99% - 100%)    Parameters below as of 17 Oct 2023 23:05  Patient On (Oxygen Delivery Method): room air        PHYSICAL EXAM:   Gen: NAD   Cardiovascular: Clinically well perfused  Respiratory: Breathing comfortably on RA  Abd: Soft with firm mass above umbilicus, nontender, no rebound/guarding  Pelvic: On speculum, atrophic vagina with cuff intact; On bimanual cuff intact, large mass 24 wk size  Extremities: 2+ pitting edema of R LE up to thigh, mild TTP  Neuro: AOx3      LABS:                        10.6   5.02  )-----------( 266      ( 17 Oct 2023 18:47 )             33.9     10-    140  |  104  |  28<H>  ----------------------------<  97  4.6   |  24  |  1.49<H>    Ca    10.0      17 Oct 2023 18:47  Phos  3.0     10-17  Mg     2.20     10-17    TPro  9.0<H>  /  Alb  4.7  /  TBili  0.3  /  DBili  x   /  AST  24  /  ALT  7   /  AlkPhos  67  10-    PT/INR - ( 17 Oct 2023 18:47 )   PT: 11.2 sec;   INR: 0.99 ratio         PTT - ( 17 Oct 2023 18:47 )  PTT:31.4 sec  Urinalysis Basic - ( 17 Oct 2023 21:51 )    Color: Yellow / Appearance: Clear / S.022 / pH: x  Gluc: x / Ketone: Negative mg/dL  / Bili: Negative / Urobili: 0.2 mg/dL   Blood: x / Protein: Negative mg/dL / Nitrite: Negative   Leuk Esterase: Negative / RBC: 0 /HPF / WBC 0 /HPF   Sq Epi: x / Non Sq Epi: 0 /HPF / Bacteria: Negative /HPF        RADIOLOGY & ADDITIONAL STUDIES:  < from: CT Abdomen and Pelvis w/ IV Cont (10.17.23 @ 19:12) >  ACC: [id number] EXAM:  CT ABDOMEN AND PELVIS IC   ORDERED BY: MARGARITA BOTELLO     PROCEDURE DATE:  10/17/2023          INTERPRETATION:  CLINICAL INFORMATION: Bladder diverticulum. Reported   history of salpingoopherectomy and hysterectomy    COMPARISON: 2023    CONTRAST/COMPLICATIONS:  IV Contrast: Omnipaque 350  90 cc administered   10 cc discarded  Oral Contrast: NONE  Complications: None reported at time of study completion    PROCEDURE:  CT of the Abdomen and Pelvis was performed.  Sagittal and coronal reformats were performed.    FINDINGS:  LOWER CHEST: Within normal limits.    LIVER: Within normal limits.  BILE DUCTS: Normal caliber.  GALLBLADDER: Within normal limits.  SPLEEN: Within normal limits.  PANCREAS: Within normal limits.  ADRENALS: Within normal limits.  KIDNEYS/URETERS: Similar severe right hydroureteronephrosis to the level   of the distal ureter secondary to compression by the large pelvic fluid   structure. right renal cortical thinning. No left hydronephrosis.  Bilateral cysts and other hypoattenuating foci too small to accurately   characterize.    BLADDER/REPRODUCTIVE ORGANS: 17.5 cm x 9.6 cm x 10.6 cm fluid distended,   thick-walled structure posterior to the bladder (previously 16.5 cm x 9.0   cm x 10.7 cm).    BOWEL: No bowel obstruction.  PERITONEUM: No ascites.  VESSELS: Atherosclerotic changes.  RETROPERITONEUM/LYMPH NODES: Lymphadenopathy as follows:  Right pelvic sidewall measuring 3.8 cm x 1.9 cm (301:78).  Left pelvic sidewall measuring 2.3 cm x 1.5 cm (301:73).  Left periaortic measuring 1.5 cm (301:44).    ABDOMINAL WALL: Within normal limits.  BONES: Degenerative changes. Grade 2 anterolisthesis of L4 on L5, similar   to prior.    IMPRESSION:  Increase in size of thick-walled, fluiddistended pelvic structure.   Pelvic and periaortic lymphadenopathy, concerning for pelvic malignancy.   Taken with reported history of hysterectomy, this is concerning for a   gynecologic malignancy. Pelvic MRI would be helpful for further   evaluation.    Similar severe right hydroureteronephrosis to the level of the distal   ureter secondary to compression by the large pelvic fluid structure.    Findings were discussed with Dr. Carlos Alberto Farr 10/17/2023 8:09 PM by   Dr. Eaton with read back confirmation.    --- End of Report ---    < end of copied text >   GYN ONC Consult Note    79yo postmenopausal P5 with PMSHx HTN, HLD, DM, ?fibroids s/p TVH (+/- BSO, patient unsure), presenting with right LE edema and pain with ambulation x3 months. Edema first started in July. Per chart, patient was evaluated in 2023 and DVT studies were negative at that time. Today patient also mentions current outpatient workup for pelvic "mass." Notably, patient was admitted to the hospital - s/p mechanical fall at home. At that time patient was also diagnosed with renal dysfunction and pelvic (gyn vs bladder) mass. During admission patient had creatinine 1.54 (unknown baseline) and urinary retention. Urology was consulted. Patient has nephrologist in [place] who she followed with for ?mass. Kidney ultrasound  showed moderate right hydronephrosis and a mildly distended bladder with a large right diverticulum along with debris in the collecting system. After discharge patient followed with urology outpatient.    UROLOGY course below:  : Non-Con CT  REPRO: Uterus is not clearly identified and may have been surgically removed  RETROPERITONEUM/LYMPH NODES: No lymphadenopathy.  IMPRESSION: 12 cm cystic structure in the pelvis which appears to be a right bladder diverticulum which is located superior to the urinary bladder and compresses the urinary bladder and appears to obstruct the right ureter causing right hydronephrosis and right hydroureter.    : Renal lasix scan on  with minimal flow to and function of the right kidney. Function is so poor that it is not possible to determine the likelihood of obstruction. Normal flow to and function of the left kidney with no evidence of obstruction. Differential renal function: Right kidney: 4%; Left kidney: 96%. Results reviewed with patient. CT reviewed redemonstrating 12cm diverticulum obstructing the right kidney causing loss of function over time. Vaginal exam with no signs of malignancy or mechanical obstruction. Otherwise patient feels comfortable, has been voiding well, no complaints.    GYn consulted for pelvic pass re-demonstrated on CT in ED. Patient reports she was told about a bladder "pouch"/diverticulum, but that the ED today notified her the CT shows a pelvic mass. She endorses constipation since a few months ago, about 1-2 bowel movements per week (last bowel movement last week). Endorses decreased appetite, but no nausea/vomiting. Reports 15 lb weight loss over last 6 months. Denies fevers/chills, abdominal pain, CP, SOB. Denies urinary complaints at this time, no retention, frequency, dysuria, hematuria.    Ob/Gyn: Does not have; referred to Ob/Gyn by Urology  ObHx:  x5  GYNHx: s/p TVH  at [facility] d/t ?fibroids (patient reports benign pathology), unsure if s/p BSO as well  PMHx: HTN, HLD, DM2, renal dysfunction, thyroid goiter  PSHx: s/p L breast lumpectomy (benign per patient), TVH  Meds: Simvastatin, HCTZ, Metformin, unsure of others  All: Denies  Family Hx: Denies hx cancer    Health Maintenance:   patient reports last colonoscopy 8 years ago  patient reports last mammogram       Vital Signs Last 24 Hrs  T(C): 36.9 (17 Oct 2023 23:05), Max: 36.9 (17 Oct 2023 23:05)  T(F): 98.4 (17 Oct 2023 23:05), Max: 98.4 (17 Oct 2023 23:05)  HR: 88 (17 Oct 2023 23:05) (74 - 88)  BP: 148/78 (17 Oct 2023 23:05) (145/69 - 171/75)  BP(mean): --  RR: 17 (17 Oct 2023 23:05) (16 - 20)  SpO2: 99% (17 Oct 2023 23:05) (99% - 100%)    Parameters below as of 17 Oct 2023 23:05  Patient On (Oxygen Delivery Method): room air        PHYSICAL EXAM:   Gen: NAD   Cardiovascular: Clinically well perfused  Respiratory: Breathing comfortably on RA  Abd: Soft with firm mass above umbilicus, nontender, no rebound/guarding  Pelvic: On speculum, atrophic vagina with cuff intact; On bimanual cuff intact, large mass 24 wk size  Extremities: 2+ pitting edema of R LE up to thigh, mild TTP  Neuro: AOx3      LABS:                        10.6   5.02  )-----------( 266      ( 17 Oct 2023 18:47 )             33.9     10-    140  |  104  |  28<H>  ----------------------------<  97  4.6   |  24  |  1.49<H>    Ca    10.0      17 Oct 2023 18:47  Phos  3.0     10-17  Mg     2.20     10-17    TPro  9.0<H>  /  Alb  4.7  /  TBili  0.3  /  DBili  x   /  AST  24  /  ALT  7   /  AlkPhos  67  10-    PT/INR - ( 17 Oct 2023 18:47 )   PT: 11.2 sec;   INR: 0.99 ratio         PTT - ( 17 Oct 2023 18:47 )  PTT:31.4 sec  Urinalysis Basic - ( 17 Oct 2023 21:51 )    Color: Yellow / Appearance: Clear / S.022 / pH: x  Gluc: x / Ketone: Negative mg/dL  / Bili: Negative / Urobili: 0.2 mg/dL   Blood: x / Protein: Negative mg/dL / Nitrite: Negative   Leuk Esterase: Negative / RBC: 0 /HPF / WBC 0 /HPF   Sq Epi: x / Non Sq Epi: 0 /HPF / Bacteria: Negative /HPF        RADIOLOGY & ADDITIONAL STUDIES:  < from: CT Abdomen and Pelvis w/ IV Cont (10.17.23 @ 19:12) >  ACC: [id number] EXAM:  CT ABDOMEN AND PELVIS IC   ORDERED BY: MARGARITA BOTELLO     PROCEDURE DATE:  10/17/2023          INTERPRETATION:  CLINICAL INFORMATION: Bladder diverticulum. Reported   history of salpingoopherectomy and hysterectomy    COMPARISON: 2023    CONTRAST/COMPLICATIONS:  IV Contrast: Omnipaque 350  90 cc administered   10 cc discarded  Oral Contrast: NONE  Complications: None reported at time of study completion    PROCEDURE:  CT of the Abdomen and Pelvis was performed.  Sagittal and coronal reformats were performed.    FINDINGS:  LOWER CHEST: Within normal limits.    LIVER: Within normal limits.  BILE DUCTS: Normal caliber.  GALLBLADDER: Within normal limits.  SPLEEN: Within normal limits.  PANCREAS: Within normal limits.  ADRENALS: Within normal limits.  KIDNEYS/URETERS: Similar severe right hydroureteronephrosis to the level   of the distal ureter secondary to compression by the large pelvic fluid   structure. right renal cortical thinning. No left hydronephrosis.  Bilateral cysts and other hypoattenuating foci too small to accurately   characterize.    BLADDER/REPRODUCTIVE ORGANS: 17.5 cm x 9.6 cm x 10.6 cm fluid distended,   thick-walled structure posterior to the bladder (previously 16.5 cm x 9.0   cm x 10.7 cm).    BOWEL: No bowel obstruction.  PERITONEUM: No ascites.  VESSELS: Atherosclerotic changes.  RETROPERITONEUM/LYMPH NODES: Lymphadenopathy as follows:  Right pelvic sidewall measuring 3.8 cm x 1.9 cm (301:78).  Left pelvic sidewall measuring 2.3 cm x 1.5 cm (301:73).  Left periaortic measuring 1.5 cm (301:44).    ABDOMINAL WALL: Within normal limits.  BONES: Degenerative changes. Grade 2 anterolisthesis of L4 on L5, similar   to prior.    IMPRESSION:  Increase in size of thick-walled, fluiddistended pelvic structure.   Pelvic and periaortic lymphadenopathy, concerning for pelvic malignancy.   Taken with reported history of hysterectomy, this is concerning for a   gynecologic malignancy. Pelvic MRI would be helpful for further   evaluation.    Similar severe right hydroureteronephrosis to the level of the distal   ureter secondary to compression by the large pelvic fluid structure.    Findings were discussed with Dr. Carlos Alberto Farr 10/17/2023 8:09 PM by   Dr. Eaton with read back confirmation.    --- End of Report ---    < end of copied text >   GYN ONC Consult Note    77yo postmenopausal P5 with PMSHx HTN, HLD, DM, ?fibroids s/p TVH (+/- BSO, patient unsure), presenting with right LE edema and pain with ambulation x3 months. Edema first started in July. Per chart, patient was evaluated in 2023 and DVT studies were negative at that time. Today patient also mentions current outpatient workup for pelvic "mass." Notably, patient was admitted to the hospital - s/p mechanical fall at home. At that time patient was also diagnosed with renal dysfunction and pelvic (gyn vs bladder) mass. During admission patient had creatinine 1.54 (unknown baseline) and urinary retention. Urology was consulted. Patient has nephrologist in [place] who she followed with for ?mass. Kidney ultrasound  showed moderate right hydronephrosis and a mildly distended bladder with a large right diverticulum along with debris in the collecting system. After discharge patient followed with urology outpatient.    UROLOGY course below:  : Non-Con CT  REPRO: Uterus is not clearly identified and may have been surgically removed  RETROPERITONEUM/LYMPH NODES: No lymphadenopathy.  IMPRESSION: 12 cm cystic structure in the pelvis which appears to be a right bladder diverticulum which is located superior to the urinary bladder and compresses the urinary bladder and appears to obstruct the right ureter causing right hydronephrosis and right hydroureter.    : Renal lasix scan on  with minimal flow to and function of the right kidney. Function is so poor that it is not possible to determine the likelihood of obstruction. Normal flow to and function of the left kidney with no evidence of obstruction. Differential renal function: Right kidney: 4%; Left kidney: 96%. Results reviewed with patient. CT reviewed redemonstrating 12cm diverticulum obstructing the right kidney causing loss of function over time. Vaginal exam with no signs of malignancy or mechanical obstruction. Otherwise patient feels comfortable, has been voiding well, no complaints.      GYn consulted for pelvic pass re-demonstrated on CT in ED today. Patient reports she was told about a bladder "pouch"/diverticulum, but that the ED today notified her the CT shows a pelvic mass. She endorses constipation since a few months ago, about 1-2 bowel movements per week (last bowel movement last week). Endorses decreased appetite, but no nausea/vomiting. Reports 15 lb weight loss over last 6 months. Denies fevers/chills, abdominal pain, CP, SOB. Denies urinary complaints at this time, no retention, frequency, dysuria, hematuria.    Ob/Gyn: Does not have; referred to Ob/Gyn by Urology  ObHx:  x5  GYNHx: s/p TVH  at [facility] d/t ?fibroids (patient reports benign pathology), unsure if s/p BSO as well  PMHx: HTN, HLD, DM2, renal dysfunction, thyroid goiter  PSHx: s/p L breast lumpectomy (benign per patient), TVH  Meds: Simvastatin, HCTZ, Metformin, unsure of others  All: Denies  Family Hx: Denies hx cancer    Health Maintenance:   patient reports last colonoscopy 8 years ago  patient reports last mammogram       Vital Signs Last 24 Hrs  T(C): 36.9 (17 Oct 2023 23:05), Max: 36.9 (17 Oct 2023 23:05)  T(F): 98.4 (17 Oct 2023 23:05), Max: 98.4 (17 Oct 2023 23:05)  HR: 88 (17 Oct 2023 23:05) (74 - 88)  BP: 148/78 (17 Oct 2023 23:05) (145/69 - 171/75)  BP(mean): --  RR: 17 (17 Oct 2023 23:05) (16 - 20)  SpO2: 99% (17 Oct 2023 23:05) (99% - 100%)    Parameters below as of 17 Oct 2023 23:05  Patient On (Oxygen Delivery Method): room air        PHYSICAL EXAM:   Gen: NAD   Cardiovascular: Clinically well perfused  Respiratory: Breathing comfortably on RA  Abd: Soft with firm mass above umbilicus, nontender, no rebound/guarding  Pelvic: On speculum, atrophic vagina with cuff intact; On bimanual cuff intact, large mass 24 wk size  Extremities: 2+ pitting edema of R LE up to thigh, mild TTP  Neuro: AOx3      LABS:                        10.6   5.02  )-----------( 266      ( 17 Oct 2023 18:47 )             33.9     10-    140  |  104  |  28<H>  ----------------------------<  97  4.6   |  24  |  1.49<H>    Ca    10.0      17 Oct 2023 18:47  Phos  3.0     10-17  Mg     2.20     10-17    TPro  9.0<H>  /  Alb  4.7  /  TBili  0.3  /  DBili  x   /  AST  24  /  ALT  7   /  AlkPhos  67  10-    PT/INR - ( 17 Oct 2023 18:47 )   PT: 11.2 sec;   INR: 0.99 ratio         PTT - ( 17 Oct 2023 18:47 )  PTT:31.4 sec  Urinalysis Basic - ( 17 Oct 2023 21:51 )    Color: Yellow / Appearance: Clear / S.022 / pH: x  Gluc: x / Ketone: Negative mg/dL  / Bili: Negative / Urobili: 0.2 mg/dL   Blood: x / Protein: Negative mg/dL / Nitrite: Negative   Leuk Esterase: Negative / RBC: 0 /HPF / WBC 0 /HPF   Sq Epi: x / Non Sq Epi: 0 /HPF / Bacteria: Negative /HPF        RADIOLOGY & ADDITIONAL STUDIES:  < from: CT Abdomen and Pelvis w/ IV Cont (10.17.23 @ 19:12) >  ACC: [id number] EXAM:  CT ABDOMEN AND PELVIS IC   ORDERED BY: MARGARITA BOTELLO     PROCEDURE DATE:  10/17/2023          INTERPRETATION:  CLINICAL INFORMATION: Bladder diverticulum. Reported   history of salpingoopherectomy and hysterectomy    COMPARISON: 2023    CONTRAST/COMPLICATIONS:  IV Contrast: Omnipaque 350  90 cc administered   10 cc discarded  Oral Contrast: NONE  Complications: None reported at time of study completion    PROCEDURE:  CT of the Abdomen and Pelvis was performed.  Sagittal and coronal reformats were performed.    FINDINGS:  LOWER CHEST: Within normal limits.    LIVER: Within normal limits.  BILE DUCTS: Normal caliber.  GALLBLADDER: Within normal limits.  SPLEEN: Within normal limits.  PANCREAS: Within normal limits.  ADRENALS: Within normal limits.  KIDNEYS/URETERS: Similar severe right hydroureteronephrosis to the level   of the distal ureter secondary to compression by the large pelvic fluid   structure. right renal cortical thinning. No left hydronephrosis.  Bilateral cysts and other hypoattenuating foci too small to accurately   characterize.    BLADDER/REPRODUCTIVE ORGANS: 17.5 cm x 9.6 cm x 10.6 cm fluid distended,   thick-walled structure posterior to the bladder (previously 16.5 cm x 9.0   cm x 10.7 cm).    BOWEL: No bowel obstruction.  PERITONEUM: No ascites.  VESSELS: Atherosclerotic changes.  RETROPERITONEUM/LYMPH NODES: Lymphadenopathy as follows:  Right pelvic sidewall measuring 3.8 cm x 1.9 cm (301:78).  Left pelvic sidewall measuring 2.3 cm x 1.5 cm (301:73).  Left periaortic measuring 1.5 cm (301:44).    ABDOMINAL WALL: Within normal limits.  BONES: Degenerative changes. Grade 2 anterolisthesis of L4 on L5, similar   to prior.    IMPRESSION:  Increase in size of thick-walled, fluiddistended pelvic structure.   Pelvic and periaortic lymphadenopathy, concerning for pelvic malignancy.   Taken with reported history of hysterectomy, this is concerning for a   gynecologic malignancy. Pelvic MRI would be helpful for further   evaluation.    Similar severe right hydroureteronephrosis to the level of the distal   ureter secondary to compression by the large pelvic fluid structure.    Findings were discussed with Dr. Carlos Alberto Farr 10/17/2023 8:09 PM by   Dr. Eaton with read back confirmation.    --- End of Report ---    < end of copied text >   GYN ONC Consult Note    77yo postmenopausal P5 with PMSHx HTN, HLD, DM, ?fibroids s/p TVH (+/- BSO, patient unsure), presenting with right LE edema and pain with ambulation x3 months. Edema first started in July. Per chart, patient was evaluated in 2023 and DVT studies were negative at that time. Today patient also mentions current outpatient workup for pelvic "mass." Notably, patient was admitted to the hospital - s/p mechanical fall at home. At that time patient was also diagnosed with renal dysfunction and pelvic (gyn vs bladder) mass. During admission patient had creatinine 1.54 (unknown baseline) and urinary retention. Urology was consulted. Patient has nephrologist in [place] who she followed with for ?mass. Kidney ultrasound  showed moderate right hydronephrosis and a mildly distended bladder with a large right diverticulum along with debris in the collecting system. After discharge patient followed with urology outpatient.    UROLOGY course below:  : Non-Con CT  REPRO: Uterus is not clearly identified and may have been surgically removed  RETROPERITONEUM/LYMPH NODES: No lymphadenopathy.  IMPRESSION: 12 cm cystic structure in the pelvis which appears to be a right bladder diverticulum which is located superior to the urinary bladder and compresses the urinary bladder and appears to obstruct the right ureter causing right hydronephrosis and right hydroureter.    : Renal lasix scan on  with minimal flow to and function of the right kidney. Function is so poor that it is not possible to determine the likelihood of obstruction. Normal flow to and function of the left kidney with no evidence of obstruction. Differential renal function: Right kidney: 4%; Left kidney: 96%. Results reviewed with patient. CT reviewed redemonstrating 12cm diverticulum obstructing the right kidney causing loss of function over time. Vaginal exam with no signs of malignancy or mechanical obstruction. Otherwise patient feels comfortable, has been voiding well, no complaints.      GYn consulted for pelvic pass re-demonstrated on CT in ED today. Patient reports she was told about a bladder "pouch"/diverticulum, but that the ED today notified her the CT shows a pelvic mass. She endorses constipation since a few months ago, about 1-2 bowel movements per week (last bowel movement last week). Endorses decreased appetite, but no nausea/vomiting. Reports 15 lb weight loss over last 6 months. Denies fevers/chills, abdominal pain, CP, SOB. Denies urinary complaints at this time, no retention, frequency, dysuria, hematuria.    Ob/Gyn: Does not have; referred to Ob/Gyn by Urology  ObHx:  x5  GYNHx: s/p TVH  at [facility] d/t ?fibroids (patient reports benign pathology), unsure if s/p BSO as well  PMHx: HTN, HLD, DM2, renal dysfunction, thyroid goiter  PSHx: s/p L breast lumpectomy (benign per patient), TVH  Meds: Simvastatin, HCTZ, Metformin, unsure of others  All: Denies  Family Hx: Denies hx cancer    Health Maintenance:   patient reports last colonoscopy 8 years ago  patient reports last mammogram       Vital Signs Last 24 Hrs  T(C): 36.9 (17 Oct 2023 23:05), Max: 36.9 (17 Oct 2023 23:05)  T(F): 98.4 (17 Oct 2023 23:05), Max: 98.4 (17 Oct 2023 23:05)  HR: 88 (17 Oct 2023 23:05) (74 - 88)  BP: 148/78 (17 Oct 2023 23:05) (145/69 - 171/75)  BP(mean): --  RR: 17 (17 Oct 2023 23:05) (16 - 20)  SpO2: 99% (17 Oct 2023 23:05) (99% - 100%)    Parameters below as of 17 Oct 2023 23:05  Patient On (Oxygen Delivery Method): room air        PHYSICAL EXAM:   Gen: NAD   Cardiovascular: Clinically well perfused  Respiratory: Breathing comfortably on RA  Abd: Soft with firm mass above umbilicus, nontender, no rebound/guarding  Pelvic: On speculum, atrophic vagina with cuff intact; On bimanual cuff intact, large mass 24 wk size  Extremities: 2+ pitting edema of R LE up to thigh, mild TTP  Neuro: AOx3      LABS:                        10.6   5.02  )-----------( 266      ( 17 Oct 2023 18:47 )             33.9     10-    140  |  104  |  28<H>  ----------------------------<  97  4.6   |  24  |  1.49<H>    Ca    10.0      17 Oct 2023 18:47  Phos  3.0     10-17  Mg     2.20     10-17    TPro  9.0<H>  /  Alb  4.7  /  TBili  0.3  /  DBili  x   /  AST  24  /  ALT  7   /  AlkPhos  67  10-    PT/INR - ( 17 Oct 2023 18:47 )   PT: 11.2 sec;   INR: 0.99 ratio         PTT - ( 17 Oct 2023 18:47 )  PTT:31.4 sec  Urinalysis Basic - ( 17 Oct 2023 21:51 )    Color: Yellow / Appearance: Clear / S.022 / pH: x  Gluc: x / Ketone: Negative mg/dL  / Bili: Negative / Urobili: 0.2 mg/dL   Blood: x / Protein: Negative mg/dL / Nitrite: Negative   Leuk Esterase: Negative / RBC: 0 /HPF / WBC 0 /HPF   Sq Epi: x / Non Sq Epi: 0 /HPF / Bacteria: Negative /HPF        RADIOLOGY & ADDITIONAL STUDIES:  < from: CT Abdomen and Pelvis w/ IV Cont (10.17.23 @ 19:12) >  ACC: [id number] EXAM:  CT ABDOMEN AND PELVIS IC   ORDERED BY: MARGARITA BOTELLO     PROCEDURE DATE:  10/17/2023          INTERPRETATION:  CLINICAL INFORMATION: Bladder diverticulum. Reported   history of salpingoopherectomy and hysterectomy    COMPARISON: 2023    CONTRAST/COMPLICATIONS:  IV Contrast: Omnipaque 350  90 cc administered   10 cc discarded  Oral Contrast: NONE  Complications: None reported at time of study completion    PROCEDURE:  CT of the Abdomen and Pelvis was performed.  Sagittal and coronal reformats were performed.    FINDINGS:  LOWER CHEST: Within normal limits.    LIVER: Within normal limits.  BILE DUCTS: Normal caliber.  GALLBLADDER: Within normal limits.  SPLEEN: Within normal limits.  PANCREAS: Within normal limits.  ADRENALS: Within normal limits.  KIDNEYS/URETERS: Similar severe right hydroureteronephrosis to the level   of the distal ureter secondary to compression by the large pelvic fluid   structure. right renal cortical thinning. No left hydronephrosis.  Bilateral cysts and other hypoattenuating foci too small to accurately   characterize.    BLADDER/REPRODUCTIVE ORGANS: 17.5 cm x 9.6 cm x 10.6 cm fluid distended,   thick-walled structure posterior to the bladder (previously 16.5 cm x 9.0   cm x 10.7 cm).    BOWEL: No bowel obstruction.  PERITONEUM: No ascites.  VESSELS: Atherosclerotic changes.  RETROPERITONEUM/LYMPH NODES: Lymphadenopathy as follows:  Right pelvic sidewall measuring 3.8 cm x 1.9 cm (301:78).  Left pelvic sidewall measuring 2.3 cm x 1.5 cm (301:73).  Left periaortic measuring 1.5 cm (301:44).    ABDOMINAL WALL: Within normal limits.  BONES: Degenerative changes. Grade 2 anterolisthesis of L4 on L5, similar   to prior.    IMPRESSION:  Increase in size of thick-walled, fluiddistended pelvic structure.   Pelvic and periaortic lymphadenopathy, concerning for pelvic malignancy.   Taken with reported history of hysterectomy, this is concerning for a   gynecologic malignancy. Pelvic MRI would be helpful for further   evaluation.    Similar severe right hydroureteronephrosis to the level of the distal   ureter secondary to compression by the large pelvic fluid structure.    Findings were discussed with Dr. Carlos Alberto Farr 10/17/2023 8:09 PM by   Dr. Eaton with read back confirmation.    --- End of Report ---    < end of copied text >   GYN ONC Consult Note    79yo postmenopausal P5 with PMSHx HTN, HLD, DM, ?fibroids s/p TVH (+/- BSO, patient unsure), presenting with right LE edema and pain with ambulation x3 months. Edema first started in July. Per chart, patient was evaluated in 2023 and DVT studies were negative at that time. Today patient also mentions current outpatient workup for pelvic "mass." Notably, patient was admitted to the hospital - s/p mechanical fall at home. At that time patient was also diagnosed with renal dysfunction and pelvic (gyn vs bladder) mass. During admission patient had creatinine 1.54 (unknown baseline) and urinary retention. Urology was consulted. Patient has nephrologist in [place] who she followed with for ?mass. Kidney ultrasound  showed moderate right hydronephrosis and a mildly distended bladder with a large right diverticulum along with debris in the collecting system. After discharge patient followed with urology outpatient.    UROLOGY course below:  : Non-Con CT  REPRO: Uterus is not clearly identified and may have been surgically removed  RETROPERITONEUM/LYMPH NODES: No lymphadenopathy.  IMPRESSION: 12 cm cystic structure in the pelvis which appears to be a right bladder diverticulum which is located superior to the urinary bladder and compresses the urinary bladder and appears to obstruct the right ureter causing right hydronephrosis and right hydroureter.    : Renal lasix scan on  with minimal flow to and function of the right kidney. Function is so poor that it is not possible to determine the likelihood of obstruction. Normal flow to and function of the left kidney with no evidence of obstruction. Differential renal function: Right kidney: 4%; Left kidney: 96%. Results reviewed with patient. CT reviewed redemonstrating 12cm diverticulum obstructing the right kidney causing loss of function over time. Vaginal exam with no signs of malignancy or mechanical obstruction. Otherwise patient feels comfortable, has been voiding well, no complaints.      GYn consulted for pelvic pass re-demonstrated on CT in ED today. Patient reports she was told about a bladder "pouch"/diverticulum, but that the ED today notified her the CT shows a pelvic mass. She endorses constipation since a few months ago, about 1-2 bowel movements per week (last bowel movement last week). Endorses decreased appetite, but no nausea/vomiting. Reports 15 lb weight loss over last 6 months. Denies fevers/chills, abdominal pain, CP, SOB. Denies urinary complaints at this time, no retention, frequency, dysuria, hematuria.    Ob/Gyn: Does not have; referred to Ob/Gyn by Urology  ObHx:  x5  GYNHx: s/p TVH  at [facility] d/t ?fibroids (patient reports benign pathology), unsure if s/p BSO as well  PMHx: HTN, HLD, DM2, renal dysfunction, thyroid goiter  PSHx: s/p L breast lumpectomy (benign per patient), TVH  Meds: Simvastatin, HCTZ, Metformin, unsure of others  All: Denies  Family Hx: Denies hx cancer    Health Maintenance:   patient reports last colonoscopy 8 years ago  patient reports last mammogram       Vital Signs Last 24 Hrs  T(C): 36.9 (17 Oct 2023 23:05), Max: 36.9 (17 Oct 2023 23:05)  T(F): 98.4 (17 Oct 2023 23:05), Max: 98.4 (17 Oct 2023 23:05)  HR: 88 (17 Oct 2023 23:05) (74 - 88)  BP: 148/78 (17 Oct 2023 23:05) (145/69 - 171/75)  BP(mean): --  RR: 17 (17 Oct 2023 23:05) (16 - 20)  SpO2: 99% (17 Oct 2023 23:05) (99% - 100%)    Parameters below as of 17 Oct 2023 23:05  Patient On (Oxygen Delivery Method): room air        PHYSICAL EXAM:   Gen: NAD   Cardiovascular: Clinically well perfused  Respiratory: Breathing comfortably on RA  Abd: Soft with firm mass above umbilicus, nontender, no rebound/guarding  Pelvic: On speculum, atrophic vagina with cuff intact; On bimanual cuff intact, large mass 24 wk size  Extremities: 2+ pitting edema of R LE up to thigh, mild TTP  Neuro: AOx3      LABS:                        10.6   5.02  )-----------( 266      ( 17 Oct 2023 18:47 )             33.9     10-    140  |  104  |  28<H>  ----------------------------<  97  4.6   |  24  |  1.49<H>    Ca    10.0      17 Oct 2023 18:47  Phos  3.0     10-17  Mg     2.20     10-17    TPro  9.0<H>  /  Alb  4.7  /  TBili  0.3  /  DBili  x   /  AST  24  /  ALT  7   /  AlkPhos  67  10-    PT/INR - ( 17 Oct 2023 18:47 )   PT: 11.2 sec;   INR: 0.99 ratio         PTT - ( 17 Oct 2023 18:47 )  PTT:31.4 sec  Urinalysis Basic - ( 17 Oct 2023 21:51 )    Color: Yellow / Appearance: Clear / S.022 / pH: x  Gluc: x / Ketone: Negative mg/dL  / Bili: Negative / Urobili: 0.2 mg/dL   Blood: x / Protein: Negative mg/dL / Nitrite: Negative   Leuk Esterase: Negative / RBC: 0 /HPF / WBC 0 /HPF   Sq Epi: x / Non Sq Epi: 0 /HPF / Bacteria: Negative /HPF        RADIOLOGY & ADDITIONAL STUDIES:  < from: CT Abdomen and Pelvis w/ IV Cont (10.17.23 @ 19:12) >  ACC: [id number] EXAM:  CT ABDOMEN AND PELVIS IC   ORDERED BY: MARGARITA BOTELLO     PROCEDURE DATE:  10/17/2023          INTERPRETATION:  CLINICAL INFORMATION: Bladder diverticulum. Reported   history of salpingoopherectomy and hysterectomy    COMPARISON: 2023    CONTRAST/COMPLICATIONS:  IV Contrast: Omnipaque 350  90 cc administered   10 cc discarded  Oral Contrast: NONE  Complications: None reported at time of study completion    PROCEDURE:  CT of the Abdomen and Pelvis was performed.  Sagittal and coronal reformats were performed.    FINDINGS:  LOWER CHEST: Within normal limits.    LIVER: Within normal limits.  BILE DUCTS: Normal caliber.  GALLBLADDER: Within normal limits.  SPLEEN: Within normal limits.  PANCREAS: Within normal limits.  ADRENALS: Within normal limits.  KIDNEYS/URETERS: Similar severe right hydroureteronephrosis to the level   of the distal ureter secondary to compression by the large pelvic fluid   structure. right renal cortical thinning. No left hydronephrosis.  Bilateral cysts and other hypoattenuating foci too small to accurately   characterize.    BLADDER/REPRODUCTIVE ORGANS: 17.5 cm x 9.6 cm x 10.6 cm fluid distended,   thick-walled structure posterior to the bladder (previously 16.5 cm x 9.0   cm x 10.7 cm).    BOWEL: No bowel obstruction.  PERITONEUM: No ascites.  VESSELS: Atherosclerotic changes.  RETROPERITONEUM/LYMPH NODES: Lymphadenopathy as follows:  Right pelvic sidewall measuring 3.8 cm x 1.9 cm (301:78).  Left pelvic sidewall measuring 2.3 cm x 1.5 cm (301:73).  Left periaortic measuring 1.5 cm (301:44).    ABDOMINAL WALL: Within normal limits.  BONES: Degenerative changes. Grade 2 anterolisthesis of L4 on L5, similar   to prior.    IMPRESSION:  Increase in size of thick-walled, fluiddistended pelvic structure.   Pelvic and periaortic lymphadenopathy, concerning for pelvic malignancy.   Taken with reported history of hysterectomy, this is concerning for a   gynecologic malignancy. Pelvic MRI would be helpful for further   evaluation.    Similar severe right hydroureteronephrosis to the level of the distal   ureter secondary to compression by the large pelvic fluid structure.    Findings were discussed with Dr. Carlos Alberto Farr 10/17/2023 8:09 PM by   Dr. Eaton with read back confirmation.    --- End of Report ---    < end of copied text >   GYN ONC Consult Note    77yo postmenopausal P5 with PMSHx HTN, HLD, DM, ?fibroids s/p TVH (+/- BSO, patient unsure), presenting with right LE edema and pain with ambulation x3 months. Edema first started in July. Per chart, patient was evaluated in 2023 and DVT studies were negative at that time.  While in the ED, patient also mentions current outpatient workup for pelvic "mass" found incidentally earlier this year when she was admitted to the hospital 23-23 s/p mechanical fall at home. At that time patient was found to have renal dysfunction (elevated Cr, retention) and pelvic (gyn vs bladder) mass. Kidney ultrasound  showed moderate right hydronephrosis and a mildly distended bladder with a large right diverticulum along with debris in the collecting system. After discharge patient followed with urology outpatient - see course below.    UROLOGY course below:  : Non-Con CT  REPRO: Uterus is not clearly identified and may have been surgically removed  RETROPERITONEUM/LYMPH NODES: No lymphadenopathy.  IMPRESSION: 12 cm cystic structure in the pelvis which appears to be a right bladder diverticulum which is located superior to the urinary bladder and compresses the urinary bladder and appears to obstruct the right ureter causing right hydronephrosis and right hydroureter.    : Renal lasix scan on  with minimal flow to and function of the right kidney. Function is so poor that it is not possible to determine the likelihood of obstruction. Normal flow to and function of the left kidney with no evidence of obstruction. Differential renal function: Right kidney: 4%; Left kidney: 96%. Results reviewed with patient. CT reviewed redemonstrating 12cm diverticulum obstructing the right kidney causing loss of function over time. Vaginal exam with no signs of malignancy or mechanical obstruction. Otherwise patient feels comfortable, has been voiding well, no complaints.      GYN consulted for pelvic pass re-demonstrated on CT in ED today. Patient reports she was told about a bladder "pouch"/diverticulum, but that the ED today notified her the CT shows a pelvic mass. She endorses constipation starting a few months ago, about 1-2 bowel movements per week (last bowel movement last week). Endorses decreased appetite, but no nausea/vomiting. Reports 15 lb weight loss over last 6 months. Denies fevers/chills, abdominal pain, CP, SOB. Denies urinary complaints at this time, no retention, frequency, dysuria, hematuria.    Ob/Gyn: Does not have; referred to Ob/Gyn by Urology  ObHx:  x5  GYNHx: s/p TVH  at [facility] d/t ?fibroids (patient reports benign pathology), unsure if s/p BSO as well  PMHx: HTN, HLD, DM2, renal dysfunction, thyroid goiter  PSHx: s/p L breast lumpectomy (benign per patient), TVH  Meds: Simvastatin, HCTZ, Metformin, unsure of others  All: Denies  Family Hx: Denies hx cancer    Health Maintenance:   patient reports last colonoscopy 8 years ago  patient reports last mammogram       Vital Signs Last 24 Hrs  T(C): 36.9 (17 Oct 2023 23:05), Max: 36.9 (17 Oct 2023 23:05)  T(F): 98.4 (17 Oct 2023 23:05), Max: 98.4 (17 Oct 2023 23:05)  HR: 88 (17 Oct 2023 23:05) (74 - 88)  BP: 148/78 (17 Oct 2023 23:05) (145/69 - 171/75)  BP(mean): --  RR: 17 (17 Oct 2023 23:05) (16 - 20)  SpO2: 99% (17 Oct 2023 23:05) (99% - 100%)    Parameters below as of 17 Oct 2023 23:05  Patient On (Oxygen Delivery Method): room air        PHYSICAL EXAM:   Gen: NAD   Cardiovascular: Clinically well perfused  Respiratory: Breathing comfortably on RA  Abd: Soft with firm mass above umbilicus, nontender, no rebound/guarding  Pelvic: On speculum, atrophic vagina with cuff intact; On bimanual cuff intact, large mass 24 wk size  Extremities: 2+ pitting edema of R LE up to thigh, mild TTP  Neuro: AOx3      LABS:                        10.6   5.02  )-----------( 266      ( 17 Oct 2023 18:47 )             33.9     10    140  |  104  |  28<H>  ----------------------------<  97  4.6   |  24  |  1.49<H>    Ca    10.0      17 Oct 2023 18:47  Phos  3.0     10-  Mg     2.20     10-    TPro  9.0<H>  /  Alb  4.7  /  TBili  0.3  /  DBili  x   /  AST  24  /  ALT  7   /  AlkPhos  67  10-    PT/INR - ( 17 Oct 2023 18:47 )   PT: 11.2 sec;   INR: 0.99 ratio         PTT - ( 17 Oct 2023 18:47 )  PTT:31.4 sec  Urinalysis Basic - ( 17 Oct 2023 21:51 )    Color: Yellow / Appearance: Clear / S.022 / pH: x  Gluc: x / Ketone: Negative mg/dL  / Bili: Negative / Urobili: 0.2 mg/dL   Blood: x / Protein: Negative mg/dL / Nitrite: Negative   Leuk Esterase: Negative / RBC: 0 /HPF / WBC 0 /HPF   Sq Epi: x / Non Sq Epi: 0 /HPF / Bacteria: Negative /HPF        RADIOLOGY & ADDITIONAL STUDIES:  < from: CT Abdomen and Pelvis w/ IV Cont (10.17.23 @ 19:12) >  ACC: [id number] EXAM:  CT ABDOMEN AND PELVIS IC   ORDERED BY: MARGARITA BOTELLO     PROCEDURE DATE:  10/17/2023          INTERPRETATION:  CLINICAL INFORMATION: Bladder diverticulum. Reported   history of salpingoopherectomy and hysterectomy    COMPARISON: 2023    CONTRAST/COMPLICATIONS:  IV Contrast: Omnipaque 350  90 cc administered   10 cc discarded  Oral Contrast: NONE  Complications: None reported at time of study completion    PROCEDURE:  CT of the Abdomen and Pelvis was performed.  Sagittal and coronal reformats were performed.    FINDINGS:  LOWER CHEST: Within normal limits.    LIVER: Within normal limits.  BILE DUCTS: Normal caliber.  GALLBLADDER: Within normal limits.  SPLEEN: Within normal limits.  PANCREAS: Within normal limits.  ADRENALS: Within normal limits.  KIDNEYS/URETERS: Similar severe right hydroureteronephrosis to the level   of the distal ureter secondary to compression by the large pelvic fluid   structure. right renal cortical thinning. No left hydronephrosis.  Bilateral cysts and other hypoattenuating foci too small to accurately   characterize.    BLADDER/REPRODUCTIVE ORGANS: 17.5 cm x 9.6 cm x 10.6 cm fluid distended,   thick-walled structure posterior to the bladder (previously 16.5 cm x 9.0   cm x 10.7 cm).    BOWEL: No bowel obstruction.  PERITONEUM: No ascites.  VESSELS: Atherosclerotic changes.  RETROPERITONEUM/LYMPH NODES: Lymphadenopathy as follows:  Right pelvic sidewall measuring 3.8 cm x 1.9 cm (301:78).  Left pelvic sidewall measuring 2.3 cm x 1.5 cm (301:73).  Left periaortic measuring 1.5 cm (301:44).    ABDOMINAL WALL: Within normal limits.  BONES: Degenerative changes. Grade 2 anterolisthesis of L4 on L5, similar   to prior.    IMPRESSION:  Increase in size of thick-walled, fluiddistended pelvic structure.   Pelvic and periaortic lymphadenopathy, concerning for pelvic malignancy.   Taken with reported history of hysterectomy, this is concerning for a   gynecologic malignancy. Pelvic MRI would be helpful for further   evaluation.    Similar severe right hydroureteronephrosis to the level of the distal   ureter secondary to compression by the large pelvic fluid structure.    Findings were discussed with Dr. Carlos Alberto Farr 10/17/2023 8:09 PM by   Dr. Eaton with read back confirmation.    --- End of Report ---    < end of copied text >   GYN ONC Consult Note    77yo postmenopausal P5 with PMSHx HTN, HLD, DM, ?fibroids s/p TVH (+/- BSO, patient unsure), presenting with right LE edema and pain with ambulation x3 months. Edema first started in July. Per chart, patient was evaluated in 2023 and DVT studies were negative at that time.  While in the ED, patient also mentions current outpatient workup for pelvic "mass" found incidentally earlier this year when she was admitted to the hospital 23-23 s/p mechanical fall at home. At that time patient was found to have renal dysfunction (elevated Cr, retention) and pelvic (gyn vs bladder) mass. Kidney ultrasound  showed moderate right hydronephrosis and a mildly distended bladder with a large right diverticulum along with debris in the collecting system. After discharge patient followed with urology outpatient - see course below.    UROLOGY course below:  : Non-Con CT  REPRO: Uterus is not clearly identified and may have been surgically removed  RETROPERITONEUM/LYMPH NODES: No lymphadenopathy.  IMPRESSION: 12 cm cystic structure in the pelvis which appears to be a right bladder diverticulum which is located superior to the urinary bladder and compresses the urinary bladder and appears to obstruct the right ureter causing right hydronephrosis and right hydroureter.    : Renal lasix scan on  with minimal flow to and function of the right kidney. Function is so poor that it is not possible to determine the likelihood of obstruction. Normal flow to and function of the left kidney with no evidence of obstruction. Differential renal function: Right kidney: 4%; Left kidney: 96%. Results reviewed with patient. CT reviewed redemonstrating 12cm diverticulum obstructing the right kidney causing loss of function over time. Vaginal exam with no signs of malignancy or mechanical obstruction. Otherwise patient feels comfortable, has been voiding well, no complaints.      GYN consulted for pelvic pass re-demonstrated on CT in ED today. Patient reports she was told about a bladder "pouch"/diverticulum, but that the ED today notified her the CT shows a pelvic mass. She endorses constipation starting a few months ago, about 1-2 bowel movements per week (last bowel movement last week). Endorses decreased appetite, but no nausea/vomiting. Reports 15 lb weight loss over last 6 months. Denies fevers/chills, abdominal pain, CP, SOB. Denies urinary complaints at this time, no retention, frequency, dysuria, hematuria.    Ob/Gyn: Does not have; referred to Ob/Gyn by Urology  ObHx:  x5  GYNHx: s/p TVH  at [facility] d/t ?fibroids (patient reports benign pathology), unsure if s/p BSO as well  PMHx: HTN, HLD, DM2, renal dysfunction, thyroid goiter  PSHx: s/p L breast lumpectomy (benign per patient), TVH  Meds: Simvastatin, HCTZ, Metformin, unsure of others  All: Denies  Family Hx: Denies hx cancer    Health Maintenance:   patient reports last colonoscopy 8 years ago  patient reports last mammogram       Vital Signs Last 24 Hrs  T(C): 36.9 (17 Oct 2023 23:05), Max: 36.9 (17 Oct 2023 23:05)  T(F): 98.4 (17 Oct 2023 23:05), Max: 98.4 (17 Oct 2023 23:05)  HR: 88 (17 Oct 2023 23:05) (74 - 88)  BP: 148/78 (17 Oct 2023 23:05) (145/69 - 171/75)  BP(mean): --  RR: 17 (17 Oct 2023 23:05) (16 - 20)  SpO2: 99% (17 Oct 2023 23:05) (99% - 100%)    Parameters below as of 17 Oct 2023 23:05  Patient On (Oxygen Delivery Method): room air        PHYSICAL EXAM:   Gen: NAD   Cardiovascular: Clinically well perfused  Respiratory: Breathing comfortably on RA  Abd: Soft with firm mass above umbilicus, nontender, no rebound/guarding  Pelvic: On speculum, atrophic vagina with cuff intact; On bimanual cuff intact, large mass 24 wk size  Extremities: 2+ pitting edema of R LE up to thigh, mild TTP  Neuro: AOx3      LABS:                        10.6   5.02  )-----------( 266      ( 17 Oct 2023 18:47 )             33.9     10    140  |  104  |  28<H>  ----------------------------<  97  4.6   |  24  |  1.49<H>    Ca    10.0      17 Oct 2023 18:47  Phos  3.0     10-  Mg     2.20     10-    TPro  9.0<H>  /  Alb  4.7  /  TBili  0.3  /  DBili  x   /  AST  24  /  ALT  7   /  AlkPhos  67  10-    PT/INR - ( 17 Oct 2023 18:47 )   PT: 11.2 sec;   INR: 0.99 ratio         PTT - ( 17 Oct 2023 18:47 )  PTT:31.4 sec  Urinalysis Basic - ( 17 Oct 2023 21:51 )    Color: Yellow / Appearance: Clear / S.022 / pH: x  Gluc: x / Ketone: Negative mg/dL  / Bili: Negative / Urobili: 0.2 mg/dL   Blood: x / Protein: Negative mg/dL / Nitrite: Negative   Leuk Esterase: Negative / RBC: 0 /HPF / WBC 0 /HPF   Sq Epi: x / Non Sq Epi: 0 /HPF / Bacteria: Negative /HPF        RADIOLOGY & ADDITIONAL STUDIES:  < from: CT Abdomen and Pelvis w/ IV Cont (10.17.23 @ 19:12) >  ACC: [id number] EXAM:  CT ABDOMEN AND PELVIS IC   ORDERED BY: MARGARITA BOTELLO     PROCEDURE DATE:  10/17/2023          INTERPRETATION:  CLINICAL INFORMATION: Bladder diverticulum. Reported   history of salpingoopherectomy and hysterectomy    COMPARISON: 2023    CONTRAST/COMPLICATIONS:  IV Contrast: Omnipaque 350  90 cc administered   10 cc discarded  Oral Contrast: NONE  Complications: None reported at time of study completion    PROCEDURE:  CT of the Abdomen and Pelvis was performed.  Sagittal and coronal reformats were performed.    FINDINGS:  LOWER CHEST: Within normal limits.    LIVER: Within normal limits.  BILE DUCTS: Normal caliber.  GALLBLADDER: Within normal limits.  SPLEEN: Within normal limits.  PANCREAS: Within normal limits.  ADRENALS: Within normal limits.  KIDNEYS/URETERS: Similar severe right hydroureteronephrosis to the level   of the distal ureter secondary to compression by the large pelvic fluid   structure. right renal cortical thinning. No left hydronephrosis.  Bilateral cysts and other hypoattenuating foci too small to accurately   characterize.    BLADDER/REPRODUCTIVE ORGANS: 17.5 cm x 9.6 cm x 10.6 cm fluid distended,   thick-walled structure posterior to the bladder (previously 16.5 cm x 9.0   cm x 10.7 cm).    BOWEL: No bowel obstruction.  PERITONEUM: No ascites.  VESSELS: Atherosclerotic changes.  RETROPERITONEUM/LYMPH NODES: Lymphadenopathy as follows:  Right pelvic sidewall measuring 3.8 cm x 1.9 cm (301:78).  Left pelvic sidewall measuring 2.3 cm x 1.5 cm (301:73).  Left periaortic measuring 1.5 cm (301:44).    ABDOMINAL WALL: Within normal limits.  BONES: Degenerative changes. Grade 2 anterolisthesis of L4 on L5, similar   to prior.    IMPRESSION:  Increase in size of thick-walled, fluiddistended pelvic structure.   Pelvic and periaortic lymphadenopathy, concerning for pelvic malignancy.   Taken with reported history of hysterectomy, this is concerning for a   gynecologic malignancy. Pelvic MRI would be helpful for further   evaluation.    Similar severe right hydroureteronephrosis to the level of the distal   ureter secondary to compression by the large pelvic fluid structure.    Findings were discussed with Dr. Carlos Alberto Farr 10/17/2023 8:09 PM by   Dr. Eaton with read back confirmation.    --- End of Report ---    < end of copied text >   GYN ONC Consult Note    77yo postmenopausal P5 with PMSHx HTN, HLD, DM, ?fibroids s/p TVH (+/- BSO, patient unsure), presenting with right LE edema and pain with ambulation x3 months. Edema first started in July. Per chart, patient was evaluated in 2023 and DVT studies were negative at that time.  While in the ED, patient also mentions current outpatient workup for pelvic "mass" found incidentally earlier this year when she was admitted to the hospital 23-23 s/p mechanical fall at home. At that time patient was found to have renal dysfunction (elevated Cr, retention) and pelvic (gyn vs bladder) mass. Kidney ultrasound  showed moderate right hydronephrosis and a mildly distended bladder with a large right diverticulum along with debris in the collecting system. After discharge patient followed with urology outpatient - see course below.    UROLOGY course below:  : Non-Con CT  REPRO: Uterus is not clearly identified and may have been surgically removed  RETROPERITONEUM/LYMPH NODES: No lymphadenopathy.  IMPRESSION: 12 cm cystic structure in the pelvis which appears to be a right bladder diverticulum which is located superior to the urinary bladder and compresses the urinary bladder and appears to obstruct the right ureter causing right hydronephrosis and right hydroureter.    : Renal lasix scan on  with minimal flow to and function of the right kidney. Function is so poor that it is not possible to determine the likelihood of obstruction. Normal flow to and function of the left kidney with no evidence of obstruction. Differential renal function: Right kidney: 4%; Left kidney: 96%. Results reviewed with patient. CT reviewed redemonstrating 12cm diverticulum obstructing the right kidney causing loss of function over time. Vaginal exam with no signs of malignancy or mechanical obstruction. Otherwise patient feels comfortable, has been voiding well, no complaints.      GYN consulted for pelvic pass re-demonstrated on CT in ED today. Patient reports she was told about a bladder "pouch"/diverticulum, but that the ED today notified her the CT shows a pelvic mass. She endorses constipation starting a few months ago, about 1-2 bowel movements per week (last bowel movement last week). Endorses decreased appetite, but no nausea/vomiting. Reports 15 lb weight loss over last 6 months. Denies fevers/chills, abdominal pain, CP, SOB. Denies urinary complaints at this time, no retention, frequency, dysuria, hematuria.    Ob/Gyn: Does not have; referred to Ob/Gyn by Urology  ObHx:  x5  GYNHx: s/p TVH  at [facility] d/t ?fibroids (patient reports benign pathology), unsure if s/p BSO as well  PMHx: HTN, HLD, DM2, renal dysfunction, thyroid goiter  PSHx: s/p L breast lumpectomy (benign per patient), TVH  Meds: Simvastatin, HCTZ, Metformin, unsure of others  All: Denies  Family Hx: Denies hx cancer    Health Maintenance:   patient reports last colonoscopy 8 years ago  patient reports last mammogram       Vital Signs Last 24 Hrs  T(C): 36.9 (17 Oct 2023 23:05), Max: 36.9 (17 Oct 2023 23:05)  T(F): 98.4 (17 Oct 2023 23:05), Max: 98.4 (17 Oct 2023 23:05)  HR: 88 (17 Oct 2023 23:05) (74 - 88)  BP: 148/78 (17 Oct 2023 23:05) (145/69 - 171/75)  BP(mean): --  RR: 17 (17 Oct 2023 23:05) (16 - 20)  SpO2: 99% (17 Oct 2023 23:05) (99% - 100%)    Parameters below as of 17 Oct 2023 23:05  Patient On (Oxygen Delivery Method): room air        PHYSICAL EXAM:   Gen: NAD   Cardiovascular: Clinically well perfused  Respiratory: Breathing comfortably on RA  Abd: Soft with firm mass above umbilicus, nontender, no rebound/guarding  Pelvic: On speculum, atrophic vagina with cuff intact; On bimanual cuff intact, large mass 24 wk size  Extremities: 2+ pitting edema of R LE up to thigh, mild TTP  Neuro: AOx3      LABS:                        10.6   5.02  )-----------( 266      ( 17 Oct 2023 18:47 )             33.9     10    140  |  104  |  28<H>  ----------------------------<  97  4.6   |  24  |  1.49<H>    Ca    10.0      17 Oct 2023 18:47  Phos  3.0     10-  Mg     2.20     10-    TPro  9.0<H>  /  Alb  4.7  /  TBili  0.3  /  DBili  x   /  AST  24  /  ALT  7   /  AlkPhos  67  10-    PT/INR - ( 17 Oct 2023 18:47 )   PT: 11.2 sec;   INR: 0.99 ratio         PTT - ( 17 Oct 2023 18:47 )  PTT:31.4 sec  Urinalysis Basic - ( 17 Oct 2023 21:51 )    Color: Yellow / Appearance: Clear / S.022 / pH: x  Gluc: x / Ketone: Negative mg/dL  / Bili: Negative / Urobili: 0.2 mg/dL   Blood: x / Protein: Negative mg/dL / Nitrite: Negative   Leuk Esterase: Negative / RBC: 0 /HPF / WBC 0 /HPF   Sq Epi: x / Non Sq Epi: 0 /HPF / Bacteria: Negative /HPF        RADIOLOGY & ADDITIONAL STUDIES:  < from: CT Abdomen and Pelvis w/ IV Cont (10.17.23 @ 19:12) >  ACC: [id number] EXAM:  CT ABDOMEN AND PELVIS IC   ORDERED BY: MARGARITA BOTELLO     PROCEDURE DATE:  10/17/2023          INTERPRETATION:  CLINICAL INFORMATION: Bladder diverticulum. Reported   history of salpingoopherectomy and hysterectomy    COMPARISON: 2023    CONTRAST/COMPLICATIONS:  IV Contrast: Omnipaque 350  90 cc administered   10 cc discarded  Oral Contrast: NONE  Complications: None reported at time of study completion    PROCEDURE:  CT of the Abdomen and Pelvis was performed.  Sagittal and coronal reformats were performed.    FINDINGS:  LOWER CHEST: Within normal limits.    LIVER: Within normal limits.  BILE DUCTS: Normal caliber.  GALLBLADDER: Within normal limits.  SPLEEN: Within normal limits.  PANCREAS: Within normal limits.  ADRENALS: Within normal limits.  KIDNEYS/URETERS: Similar severe right hydroureteronephrosis to the level   of the distal ureter secondary to compression by the large pelvic fluid   structure. right renal cortical thinning. No left hydronephrosis.  Bilateral cysts and other hypoattenuating foci too small to accurately   characterize.    BLADDER/REPRODUCTIVE ORGANS: 17.5 cm x 9.6 cm x 10.6 cm fluid distended,   thick-walled structure posterior to the bladder (previously 16.5 cm x 9.0   cm x 10.7 cm).    BOWEL: No bowel obstruction.  PERITONEUM: No ascites.  VESSELS: Atherosclerotic changes.  RETROPERITONEUM/LYMPH NODES: Lymphadenopathy as follows:  Right pelvic sidewall measuring 3.8 cm x 1.9 cm (301:78).  Left pelvic sidewall measuring 2.3 cm x 1.5 cm (301:73).  Left periaortic measuring 1.5 cm (301:44).    ABDOMINAL WALL: Within normal limits.  BONES: Degenerative changes. Grade 2 anterolisthesis of L4 on L5, similar   to prior.    IMPRESSION:  Increase in size of thick-walled, fluiddistended pelvic structure.   Pelvic and periaortic lymphadenopathy, concerning for pelvic malignancy.   Taken with reported history of hysterectomy, this is concerning for a   gynecologic malignancy. Pelvic MRI would be helpful for further   evaluation.    Similar severe right hydroureteronephrosis to the level of the distal   ureter secondary to compression by the large pelvic fluid structure.    Findings were discussed with Dr. Carlos Alberto Farr 10/17/2023 8:09 PM by   Dr. Eaton with read back confirmation.    --- End of Report ---    < end of copied text >

## 2023-10-18 NOTE — CONSULT NOTE ADULT - ASSESSMENT
79yo postmenopausal P5 with PMSHx HTN, HLD, DM, ?fibroids s/p TVH (+/- BSO, patient unsure), presenting with right LE edema and pain with ambulation x3 months in the setting of early satiety, weight loss, constipation, and increase in size of pelvic mass with new lymphadenopathy. Findings are suggestive of malignancy. Patient is hemodynamically stable. Her Cr is elevated, however per chart review this appears her new baseline.     Recommendations  - CT chest (non-con)  - Tumor markers: CEA, CA 19-9, CA-125    GYN Onc will continue to follow    D/w Dr. Marianna Lieberman PGY2 77yo postmenopausal P5 with PMSHx HTN, HLD, DM, ?fibroids s/p TVH (+/- BSO, patient unsure), presenting with right LE edema and pain with ambulation x3 months in the setting of early satiety, weight loss, constipation, and increase in size of pelvic mass with new lymphadenopathy. Findings are suggestive of malignancy. Patient is hemodynamically stable. Her Cr is elevated, however per chart review this appears her new baseline.     Recommendations  - CT chest (non-con)  - Tumor markers: CEA, CA 19-9, CA-125    GYN Onc will continue to follow    D/w Dr. Marianna Lieberman PGY2 79yo postmenopausal P5 with PMSHx HTN, HLD, DM, ?fibroids s/p TVH (+/- BSO, patient unsure), presenting with right LE edema and pain with ambulation x3 months in the setting of early satiety, weight loss, constipation, and increase in size of pelvic mass with new lymphadenopathy. Discussed CT with radiology who states the mass is most likely gyn in origin (not urologic) given no communication with bladder seen on CT w/ IV contrast. Per radiology, prior imaging did not utilize contrast and therefore mass may have been thought to be arising from bladder. Findings are suggestive of gyn malignancy. Discussed results of CT with patient and the concern for malignancy given clinical findings. Patient confirmed understanding and all questions were answered to apparent satisfaction. She is hemodynamically stable. Her Cr is elevated, however per chart review this appears her new baseline.    Recommendations  - CT chest (non-con)  - Tumor markers: CEA, CA 19-9, CA-125    D/w Dr. Cabral, Fellow  SERGIO Lieberman PGY2      Addendum  - CT chest reviewed  - Lab contacted by ED provider - tumor markers to result in approximately 48 hours  - Patient is cleared for discharge from GYN ONC perspective with close follow-up for outpatient work up  - Nurse navigator contacted for Gyn Onc and will reach out to patient to schedule appointment; patient is aware  - Patient added to Gyn Onc follow-up list for monitoring of tumor markers    D/w Dr. Cabral, Fellow  SERGIO Lieberman PGY2 77yo postmenopausal P5 with PMSHx HTN, HLD, DM, ?fibroids s/p TVH (+/- BSO, patient unsure), presenting with right LE edema and pain with ambulation x3 months in the setting of early satiety, weight loss, constipation, and increase in size of pelvic mass with new lymphadenopathy. Discussed CT with radiology who states the mass is most likely gyn in origin (not urologic) given no communication with bladder seen on CT w/ IV contrast. Per radiology, prior imaging did not utilize contrast and therefore mass may have been thought to be arising from bladder. Findings are suggestive of gyn malignancy. Discussed results of CT with patient and the concern for malignancy given clinical findings. Patient confirmed understanding and all questions were answered to apparent satisfaction. She is hemodynamically stable. Her Cr is elevated, however per chart review this appears her new baseline.    Recommendations  - CT chest (non-con)  - Tumor markers: CEA, CA 19-9, CA-125    D/w Dr. Cabral, Fellow  SERGIO Lieberman PGY2      Addendum  - CT chest reviewed  - Lab contacted by ED provider - tumor markers to result in approximately 48 hours  - Patient is cleared for discharge from GYN ONC perspective with close follow-up for outpatient work up  - Nurse navigator contacted for Gyn Onc and will reach out to patient to schedule appointment; patient is aware  - Patient added to Gyn Onc follow-up list for monitoring of tumor markers    D/w Dr. Cabral, Fellow  SERGIO Lieberman PGY2

## 2023-10-18 NOTE — ED ADULT NURSE REASSESSMENT NOTE - NS ED NURSE REASSESS COMMENT FT1
KAMAR RN. Patient A&Ox4, resting in stretcher, respirations even and unlabored. Patient denies any complaints at this time. Patient and family member aware of plan for admission to hospital.

## 2023-10-19 LAB
CULTURE RESULTS: SIGNIFICANT CHANGE UP
CULTURE RESULTS: SIGNIFICANT CHANGE UP
SPECIMEN SOURCE: SIGNIFICANT CHANGE UP
SPECIMEN SOURCE: SIGNIFICANT CHANGE UP

## 2023-10-19 NOTE — ED POST DISCHARGE NOTE - RESULT SUMMARY
ZOË Shelton: CEA and CA 19–9 both elevated.  Patient seen by GYN oncology yesterday, states they will follow-up the tumor markers.  Courtesy call made to the GYN/ONC PA at [id number] to inform them that the results are back.

## 2023-10-23 ENCOUNTER — NON-APPOINTMENT (OUTPATIENT)
Age: 78
End: 2023-10-23

## 2023-11-01 ENCOUNTER — APPOINTMENT (OUTPATIENT)
Dept: GYNECOLOGIC ONCOLOGY | Facility: HOSPITAL | Age: 78
End: 2023-11-01
Payer: COMMERCIAL

## 2023-11-01 ENCOUNTER — OUTPATIENT (OUTPATIENT)
Dept: OUTPATIENT SERVICES | Facility: HOSPITAL | Age: 78
LOS: 1 days | End: 2023-11-01

## 2023-11-01 VITALS
HEART RATE: 75 BPM | WEIGHT: 137 LBS | DIASTOLIC BLOOD PRESSURE: 71 MMHG | HEIGHT: 61 IN | BODY MASS INDEX: 25.86 KG/M2 | SYSTOLIC BLOOD PRESSURE: 151 MMHG | TEMPERATURE: 97.7 F

## 2023-11-01 DIAGNOSIS — Z98.890 OTHER SPECIFIED POSTPROCEDURAL STATES: Chronic | ICD-10-CM

## 2023-11-01 DIAGNOSIS — Z90.710 ACQUIRED ABSENCE OF BOTH CERVIX AND UTERUS: Chronic | ICD-10-CM

## 2023-11-01 PROCEDURE — 99204 OFFICE O/P NEW MOD 45 MIN: CPT

## 2023-11-02 DIAGNOSIS — N28.9 DISORDER OF KIDNEY AND URETER, UNSPECIFIED: ICD-10-CM

## 2023-11-02 DIAGNOSIS — N13.30 UNSPECIFIED HYDRONEPHROSIS: ICD-10-CM

## 2023-11-02 DIAGNOSIS — R59.1 GENERALIZED ENLARGED LYMPH NODES: ICD-10-CM

## 2023-11-02 DIAGNOSIS — R19.00 INTRA-ABDOMINAL AND PELVIC SWELLING, MASS AND LUMP, UNSPECIFIED SITE: ICD-10-CM

## 2023-11-09 ENCOUNTER — OUTPATIENT (OUTPATIENT)
Dept: OUTPATIENT SERVICES | Facility: HOSPITAL | Age: 78
LOS: 1 days | End: 2023-11-09
Payer: SELF-PAY

## 2023-11-09 ENCOUNTER — RESULT REVIEW (OUTPATIENT)
Age: 78
End: 2023-11-09

## 2023-11-09 ENCOUNTER — APPOINTMENT (OUTPATIENT)
Dept: CT IMAGING | Facility: HOSPITAL | Age: 78
End: 2023-11-09

## 2023-11-09 DIAGNOSIS — R19.00 INTRA-ABDOMINAL AND PELVIC SWELLING, MASS AND LUMP, UNSPECIFIED SITE: ICD-10-CM

## 2023-11-09 DIAGNOSIS — Z90.710 ACQUIRED ABSENCE OF BOTH CERVIX AND UTERUS: Chronic | ICD-10-CM

## 2023-11-09 DIAGNOSIS — Z98.890 OTHER SPECIFIED POSTPROCEDURAL STATES: Chronic | ICD-10-CM

## 2023-11-09 DIAGNOSIS — Z00.00 ENCOUNTER FOR GENERAL ADULT MEDICAL EXAMINATION WITHOUT ABNORMAL FINDINGS: ICD-10-CM

## 2023-11-09 PROCEDURE — 88173 CYTOPATH EVAL FNA REPORT: CPT

## 2023-11-09 PROCEDURE — 88173 CYTOPATH EVAL FNA REPORT: CPT | Mod: 26

## 2023-11-09 PROCEDURE — 88305 TISSUE EXAM BY PATHOLOGIST: CPT

## 2023-11-09 PROCEDURE — 38505 NEEDLE BIOPSY LYMPH NODES: CPT

## 2023-11-09 PROCEDURE — 88342 IMHCHEM/IMCYTCHM 1ST ANTB: CPT

## 2023-11-09 PROCEDURE — 77012 CT SCAN FOR NEEDLE BIOPSY: CPT

## 2023-11-09 PROCEDURE — 88342 IMHCHEM/IMCYTCHM 1ST ANTB: CPT | Mod: 26

## 2023-11-09 PROCEDURE — 88341 IMHCHEM/IMCYTCHM EA ADD ANTB: CPT

## 2023-11-09 PROCEDURE — 88305 TISSUE EXAM BY PATHOLOGIST: CPT | Mod: 26

## 2023-11-09 PROCEDURE — 88341 IMHCHEM/IMCYTCHM EA ADD ANTB: CPT | Mod: 26

## 2023-11-09 PROCEDURE — 77012 CT SCAN FOR NEEDLE BIOPSY: CPT | Mod: 26

## 2023-11-14 LAB
NON-GYNECOLOGICAL CYTOLOGY STUDY: SIGNIFICANT CHANGE UP
NON-GYNECOLOGICAL CYTOLOGY STUDY: SIGNIFICANT CHANGE UP

## 2023-11-28 ENCOUNTER — APPOINTMENT (OUTPATIENT)
Dept: INTERNAL MEDICINE | Facility: CLINIC | Age: 78
End: 2023-11-28
Payer: COMMERCIAL

## 2023-11-28 ENCOUNTER — OUTPATIENT (OUTPATIENT)
Dept: OUTPATIENT SERVICES | Facility: HOSPITAL | Age: 78
LOS: 1 days | End: 2023-11-28

## 2023-11-28 VITALS
SYSTOLIC BLOOD PRESSURE: 124 MMHG | DIASTOLIC BLOOD PRESSURE: 86 MMHG | WEIGHT: 139.6 LBS | HEIGHT: 61 IN | OXYGEN SATURATION: 99 % | BODY MASS INDEX: 26.36 KG/M2 | HEART RATE: 87 BPM

## 2023-11-28 DIAGNOSIS — Z90.710 ACQUIRED ABSENCE OF BOTH CERVIX AND UTERUS: Chronic | ICD-10-CM

## 2023-11-28 DIAGNOSIS — E11.9 TYPE 2 DIABETES MELLITUS W/OUT COMPLICATIONS: ICD-10-CM

## 2023-11-28 DIAGNOSIS — Z98.890 OTHER SPECIFIED POSTPROCEDURAL STATES: Chronic | ICD-10-CM

## 2023-11-28 PROCEDURE — 99214 OFFICE O/P EST MOD 30 MIN: CPT | Mod: GC

## 2023-11-29 ENCOUNTER — NON-APPOINTMENT (OUTPATIENT)
Age: 78
End: 2023-11-29

## 2023-11-29 LAB
ALBUMIN SERPL ELPH-MCNC: 4.4 G/DL
ALP BLD-CCNC: 68 U/L
ALT SERPL-CCNC: 8 U/L
ANION GAP SERPL CALC-SCNC: 11 MMOL/L
AST SERPL-CCNC: 21 U/L
BASOPHILS # BLD AUTO: 0.09 K/UL
BASOPHILS NFR BLD AUTO: 1.6 %
BILIRUB SERPL-MCNC: 0.3 MG/DL
BUN SERPL-MCNC: 24 MG/DL
CALCIUM SERPL-MCNC: 9.6 MG/DL
CHLORIDE SERPL-SCNC: 104 MMOL/L
CO2 SERPL-SCNC: 22 MMOL/L
CREAT SERPL-MCNC: 1.49 MG/DL
EGFR: 36 ML/MIN/1.73M2
EOSINOPHIL # BLD AUTO: 0.12 K/UL
EOSINOPHIL NFR BLD AUTO: 2.2 %
ESTIMATED AVERAGE GLUCOSE: 94 MG/DL
GLUCOSE SERPL-MCNC: 88 MG/DL
HBA1C MFR BLD HPLC: 4.9 %
HCT VFR BLD CALC: 33.8 %
HGB BLD-MCNC: 10.7 G/DL
IMM GRANULOCYTES NFR BLD AUTO: 0.2 %
LYMPHOCYTES # BLD AUTO: 2.19 K/UL
LYMPHOCYTES NFR BLD AUTO: 39.7 %
MAN DIFF?: NORMAL
MCHC RBC-ENTMCNC: 30.1 PG
MCHC RBC-ENTMCNC: 31.7 GM/DL
MCV RBC AUTO: 95.2 FL
MONOCYTES # BLD AUTO: 0.72 K/UL
MONOCYTES NFR BLD AUTO: 13 %
NEUTROPHILS # BLD AUTO: 2.39 K/UL
NEUTROPHILS NFR BLD AUTO: 43.3 %
PLATELET # BLD AUTO: 260 K/UL
POTASSIUM SERPL-SCNC: 4.5 MMOL/L
PROT SERPL-MCNC: 8.7 G/DL
RBC # BLD: 3.55 M/UL
RBC # FLD: 12 %
SODIUM SERPL-SCNC: 138 MMOL/L
WBC # FLD AUTO: 5.52 K/UL

## 2023-12-01 ENCOUNTER — APPOINTMENT (OUTPATIENT)
Dept: UROLOGY | Facility: CLINIC | Age: 78
End: 2023-12-01

## 2023-12-01 ENCOUNTER — OUTPATIENT (OUTPATIENT)
Dept: OUTPATIENT SERVICES | Facility: HOSPITAL | Age: 78
LOS: 1 days | End: 2023-12-01
Payer: MEDICARE

## 2023-12-01 DIAGNOSIS — Z90.710 ACQUIRED ABSENCE OF BOTH CERVIX AND UTERUS: Chronic | ICD-10-CM

## 2023-12-01 DIAGNOSIS — R35.0 FREQUENCY OF MICTURITION: ICD-10-CM

## 2023-12-01 DIAGNOSIS — Z98.890 OTHER SPECIFIED POSTPROCEDURAL STATES: Chronic | ICD-10-CM

## 2023-12-01 PROCEDURE — G0463: CPT

## 2023-12-04 ENCOUNTER — RESULT REVIEW (OUTPATIENT)
Age: 78
End: 2023-12-04

## 2023-12-04 DIAGNOSIS — N32.3 DIVERTICULUM OF BLADDER: ICD-10-CM

## 2023-12-04 DIAGNOSIS — R89.6 ABNORMAL CYTOLOGICAL FINDINGS IN SPECIMENS FROM OTHER ORGANS, SYSTEMS AND TISSUES: ICD-10-CM

## 2023-12-04 DIAGNOSIS — I10 ESSENTIAL (PRIMARY) HYPERTENSION: ICD-10-CM

## 2023-12-04 DIAGNOSIS — M79.89 OTHER SPECIFIED SOFT TISSUE DISORDERS: ICD-10-CM

## 2023-12-04 DIAGNOSIS — E11.9 TYPE 2 DIABETES MELLITUS WITHOUT COMPLICATIONS: ICD-10-CM

## 2023-12-04 DIAGNOSIS — R19.00 INTRA-ABDOMINAL AND PELVIC SWELLING, MASS AND LUMP, UNSPECIFIED SITE: ICD-10-CM

## 2023-12-08 DIAGNOSIS — M79.89 OTHER SPECIFIED SOFT TISSUE DISORDERS: ICD-10-CM

## 2023-12-08 DIAGNOSIS — R89.6 ABNORMAL CYTOLOGICAL FINDINGS IN SPECIMENS FROM OTHER ORGANS, SYSTEMS AND TISSUES: ICD-10-CM

## 2023-12-08 DIAGNOSIS — N32.3 DIVERTICULUM OF BLADDER: ICD-10-CM

## 2023-12-15 ENCOUNTER — OUTPATIENT (OUTPATIENT)
Dept: OUTPATIENT SERVICES | Facility: HOSPITAL | Age: 78
LOS: 1 days | End: 2023-12-15
Payer: SELF-PAY

## 2023-12-15 VITALS
TEMPERATURE: 98 F | HEIGHT: 61.81 IN | SYSTOLIC BLOOD PRESSURE: 132 MMHG | RESPIRATION RATE: 16 BRPM | DIASTOLIC BLOOD PRESSURE: 78 MMHG | HEART RATE: 78 BPM | OXYGEN SATURATION: 98 % | WEIGHT: 134.92 LBS

## 2023-12-15 DIAGNOSIS — N32.3 DIVERTICULUM OF BLADDER: Chronic | ICD-10-CM

## 2023-12-15 DIAGNOSIS — Z98.890 OTHER SPECIFIED POSTPROCEDURAL STATES: Chronic | ICD-10-CM

## 2023-12-15 DIAGNOSIS — Z01.818 ENCOUNTER FOR OTHER PREPROCEDURAL EXAMINATION: ICD-10-CM

## 2023-12-15 DIAGNOSIS — Z90.710 ACQUIRED ABSENCE OF BOTH CERVIX AND UTERUS: Chronic | ICD-10-CM

## 2023-12-15 DIAGNOSIS — C80.1 MALIGNANT (PRIMARY) NEOPLASM, UNSPECIFIED: ICD-10-CM

## 2023-12-15 DIAGNOSIS — E11.9 TYPE 2 DIABETES MELLITUS WITHOUT COMPLICATIONS: ICD-10-CM

## 2023-12-15 LAB
A1C WITH ESTIMATED AVERAGE GLUCOSE RESULT: 4.8 % — SIGNIFICANT CHANGE UP (ref 4–5.6)
A1C WITH ESTIMATED AVERAGE GLUCOSE RESULT: 4.8 % — SIGNIFICANT CHANGE UP (ref 4–5.6)
ANION GAP SERPL CALC-SCNC: 8 MMOL/L — SIGNIFICANT CHANGE UP (ref 5–17)
ANION GAP SERPL CALC-SCNC: 8 MMOL/L — SIGNIFICANT CHANGE UP (ref 5–17)
BUN SERPL-MCNC: 21 MG/DL — SIGNIFICANT CHANGE UP (ref 7–23)
BUN SERPL-MCNC: 21 MG/DL — SIGNIFICANT CHANGE UP (ref 7–23)
CALCIUM SERPL-MCNC: 9.7 MG/DL — SIGNIFICANT CHANGE UP (ref 8.4–10.5)
CALCIUM SERPL-MCNC: 9.7 MG/DL — SIGNIFICANT CHANGE UP (ref 8.4–10.5)
CHLORIDE SERPL-SCNC: 104 MMOL/L — SIGNIFICANT CHANGE UP (ref 96–108)
CHLORIDE SERPL-SCNC: 104 MMOL/L — SIGNIFICANT CHANGE UP (ref 96–108)
CO2 SERPL-SCNC: 26 MMOL/L — SIGNIFICANT CHANGE UP (ref 22–31)
CO2 SERPL-SCNC: 26 MMOL/L — SIGNIFICANT CHANGE UP (ref 22–31)
CREAT SERPL-MCNC: 1.64 MG/DL — HIGH (ref 0.5–1.3)
CREAT SERPL-MCNC: 1.64 MG/DL — HIGH (ref 0.5–1.3)
EGFR: 32 ML/MIN/1.73M2 — LOW
EGFR: 32 ML/MIN/1.73M2 — LOW
ESTIMATED AVERAGE GLUCOSE: 91 MG/DL — SIGNIFICANT CHANGE UP (ref 68–114)
ESTIMATED AVERAGE GLUCOSE: 91 MG/DL — SIGNIFICANT CHANGE UP (ref 68–114)
GLUCOSE SERPL-MCNC: 102 MG/DL — HIGH (ref 70–99)
GLUCOSE SERPL-MCNC: 102 MG/DL — HIGH (ref 70–99)
HCT VFR BLD CALC: 35 % — SIGNIFICANT CHANGE UP (ref 34.5–45)
HCT VFR BLD CALC: 35 % — SIGNIFICANT CHANGE UP (ref 34.5–45)
HGB BLD-MCNC: 10.8 G/DL — LOW (ref 11.5–15.5)
HGB BLD-MCNC: 10.8 G/DL — LOW (ref 11.5–15.5)
MCHC RBC-ENTMCNC: 30.4 PG — SIGNIFICANT CHANGE UP (ref 27–34)
MCHC RBC-ENTMCNC: 30.4 PG — SIGNIFICANT CHANGE UP (ref 27–34)
MCHC RBC-ENTMCNC: 30.9 GM/DL — LOW (ref 32–36)
MCHC RBC-ENTMCNC: 30.9 GM/DL — LOW (ref 32–36)
MCV RBC AUTO: 98.6 FL — SIGNIFICANT CHANGE UP (ref 80–100)
MCV RBC AUTO: 98.6 FL — SIGNIFICANT CHANGE UP (ref 80–100)
NRBC # BLD: 0 /100 WBCS — SIGNIFICANT CHANGE UP (ref 0–0)
NRBC # BLD: 0 /100 WBCS — SIGNIFICANT CHANGE UP (ref 0–0)
PLATELET # BLD AUTO: 237 K/UL — SIGNIFICANT CHANGE UP (ref 150–400)
PLATELET # BLD AUTO: 237 K/UL — SIGNIFICANT CHANGE UP (ref 150–400)
POTASSIUM SERPL-MCNC: 4.7 MMOL/L — SIGNIFICANT CHANGE UP (ref 3.5–5.3)
POTASSIUM SERPL-MCNC: 4.7 MMOL/L — SIGNIFICANT CHANGE UP (ref 3.5–5.3)
POTASSIUM SERPL-SCNC: 4.7 MMOL/L — SIGNIFICANT CHANGE UP (ref 3.5–5.3)
POTASSIUM SERPL-SCNC: 4.7 MMOL/L — SIGNIFICANT CHANGE UP (ref 3.5–5.3)
RBC # BLD: 3.55 M/UL — LOW (ref 3.8–5.2)
RBC # BLD: 3.55 M/UL — LOW (ref 3.8–5.2)
RBC # FLD: 12.3 % — SIGNIFICANT CHANGE UP (ref 10.3–14.5)
RBC # FLD: 12.3 % — SIGNIFICANT CHANGE UP (ref 10.3–14.5)
SODIUM SERPL-SCNC: 138 MMOL/L — SIGNIFICANT CHANGE UP (ref 135–145)
SODIUM SERPL-SCNC: 138 MMOL/L — SIGNIFICANT CHANGE UP (ref 135–145)
WBC # BLD: 4.89 K/UL — SIGNIFICANT CHANGE UP (ref 3.8–10.5)
WBC # BLD: 4.89 K/UL — SIGNIFICANT CHANGE UP (ref 3.8–10.5)
WBC # FLD AUTO: 4.89 K/UL — SIGNIFICANT CHANGE UP (ref 3.8–10.5)
WBC # FLD AUTO: 4.89 K/UL — SIGNIFICANT CHANGE UP (ref 3.8–10.5)

## 2023-12-15 PROCEDURE — 80048 BASIC METABOLIC PNL TOTAL CA: CPT

## 2023-12-15 PROCEDURE — 83036 HEMOGLOBIN GLYCOSYLATED A1C: CPT

## 2023-12-15 PROCEDURE — 87086 URINE CULTURE/COLONY COUNT: CPT

## 2023-12-15 PROCEDURE — G0463: CPT

## 2023-12-15 PROCEDURE — 85027 COMPLETE CBC AUTOMATED: CPT

## 2023-12-15 RX ORDER — SIMVASTATIN 20 MG/1
1 TABLET, FILM COATED ORAL
Refills: 0 | DISCHARGE

## 2023-12-15 NOTE — H&P PST ADULT - HISTORY OF PRESENT ILLNESS
79 y/o F with PMHx significant for DM2 (no meds), CKD, RLE edema, Thyroid goiter, Uterine Fibroids s/p KEI (2014) reportedly had  findings of pelvic mass during hospitalization (07/2023)  w/ subsequent cystoscopy performed demonstrating bladder diverticulum. She is now scheduled for Cystoscopy, Bladder Biopsy, Right Ureteroscopy w/ Biopsy with Dr. Sarah on 01/05/2024.

## 2023-12-15 NOTE — H&P PST ADULT - ASSESSMENT
DASI score: 5.2 METS  DASI activity: walking with cane at her own pace  Loose teeth or denture: Complete upper and lower dentures   Mallampati: Class 2

## 2023-12-15 NOTE — H&P PST ADULT - ADMIT DATE
Caller would like to discuss an/a Order for [employer]. Writer advised caller of callback within 24-72 hours.    Patient Name: Clarisse Jackson  Caller Name: patient   Name of Facility:    Callback Number: [phone]  Best Availability: anytime   Can A Detailed Message Be left? yes  Fax Number:    Additional Info:  Patient has a walker however is still in fear of falling again.  Patient would like for the provider to put in an order for the walker above and send it to [facility].  Please advise.   Did you confirm the message with the caller?: yes    Thank you,  Amanda Sanderson     15-Dec-2023

## 2023-12-15 NOTE — H&P PST ADULT - NSICDXPASTMEDICALHX_GEN_ALL_CORE_FT
PAST MEDICAL HISTORY:  CKD (chronic kidney disease)     Edema of right lower leg     Essential hypertension     Hyperlipidemia     T2DM (type 2 diabetes mellitus)     Thyroid goiter

## 2023-12-15 NOTE — H&P PST ADULT - PROBLEM SELECTOR PLAN 1
Scheduled for Cystoscopy, Bladder Biopsy, Right Ureteroscopy with Biopsy   PST instructions provided. PST instructions provided. Patient verbalized understanding of instructions.    Patient reports her blood pressure and cholesterol medications were stopped by pcp.

## 2023-12-15 NOTE — H&P PST ADULT - NSICDXPASTSURGICALHX_GEN_ALL_CORE_FT
PAST SURGICAL HISTORY:  Bladder diverticulum     H/O lumpectomy     H/O: hysterectomy     S/P cystoscopy

## 2023-12-15 NOTE — H&P PST ADULT - NS HP PST LATEX ALLERGY
Telephone Encounter by Lara Howard DO at 07/24/18 08:19 AM     Author:  Lara Howard DO Service:  (none) Author Type:  Physician     Filed:  07/24/18 08:19 AM Encounter Date:  7/20/2018 Status:  Signed     :  Lara Howard DO (Physician)            Noted, thx[FH1.1M]        Revision History        User Key Date/Time User Provider Type Action    > FH1.1 07/24/18 08:19 AM Lara Howard DO Physician Sign    M - Manual             No

## 2023-12-22 ENCOUNTER — APPOINTMENT (OUTPATIENT)
Dept: INTERNAL MEDICINE | Facility: CLINIC | Age: 78
End: 2023-12-22
Payer: COMMERCIAL

## 2023-12-22 ENCOUNTER — OUTPATIENT (OUTPATIENT)
Dept: OUTPATIENT SERVICES | Facility: HOSPITAL | Age: 78
LOS: 1 days | End: 2023-12-22

## 2023-12-22 VITALS
HEART RATE: 72 BPM | DIASTOLIC BLOOD PRESSURE: 70 MMHG | RESPIRATION RATE: 16 BRPM | HEIGHT: 61 IN | OXYGEN SATURATION: 99 % | SYSTOLIC BLOOD PRESSURE: 140 MMHG | BODY MASS INDEX: 25.34 KG/M2 | WEIGHT: 134.25 LBS

## 2023-12-22 DIAGNOSIS — Z98.890 OTHER SPECIFIED POSTPROCEDURAL STATES: Chronic | ICD-10-CM

## 2023-12-22 DIAGNOSIS — I10 ESSENTIAL (PRIMARY) HYPERTENSION: ICD-10-CM

## 2023-12-22 DIAGNOSIS — N32.3 DIVERTICULUM OF BLADDER: Chronic | ICD-10-CM

## 2023-12-22 DIAGNOSIS — Z90.710 ACQUIRED ABSENCE OF BOTH CERVIX AND UTERUS: Chronic | ICD-10-CM

## 2023-12-22 PROBLEM — E11.9 TYPE 2 DIABETES MELLITUS WITHOUT COMPLICATIONS: Chronic | Status: ACTIVE | Noted: 2023-12-15

## 2023-12-22 PROBLEM — R60.0 LOCALIZED EDEMA: Chronic | Status: ACTIVE | Noted: 2023-12-15

## 2023-12-22 PROBLEM — N18.9 CHRONIC KIDNEY DISEASE, UNSPECIFIED: Chronic | Status: ACTIVE | Noted: 2023-12-15

## 2023-12-22 PROCEDURE — 99213 OFFICE O/P EST LOW 20 MIN: CPT | Mod: GE

## 2023-12-22 NOTE — ASSESSMENT
[Patient Optimized for Surgery] : Patient optimized for surgery [No Further Testing Recommended] : no further testing recommended [As per surgery] : as per surgery [High Risk Surgery - Intraperitoneal, Intrathoracic or Supringuinal Vascular Procedures] : High Risk Surgery - Intraperitoneal, Intrathoracic or Supringuinal Vascular Procedures - Yes (1) [Ischemic Heart Disease] : Ischemic Heart Disease - No (0) [Congestive Heart Failure] : Congestive Heart Failure - No (0) [Prior Cerebrovascular Accident or TIA] : Prior Cerebrovascular Accident or TIA - No (0) [Creatinine >= 2mg/dL (1 Point)] : Creatinine >= 2mg/dL - No (0) [Insulin-dependent Diabetic (1 Point)] : Insulin-dependent Diabetic - No (0) [1] : 1 , RCRI Class: II, Risk of Post-Op Cardiac Complications: 6.0%, 95% CI for Risk Estimate: 4.9% - 7.4% [FreeTextEntry4] :  Recent EKG and echo without concerning findings, no cardiopulmonary symptoms. RCRI 1 point, Class II Risk. 4-6 METS. Patient not on any daily medications currently. Patient is optimized from medical perspective for "cystoscopy and random bladder biopsies, left retrograde pyelogram and possible ureteroscopy and biopsy" on 01/05/24 for evaluation of pelvic mass. [FreeTextEntry5] : N/A [FreeTextEntry6] : N/A [FreeTextEntry7] : N/A

## 2023-12-22 NOTE — RESULTS/DATA
[] : results reviewed [de-identified] : Stable mild anemia [de-identified] : Cr 1.64 which is around b/l and likely 2/2 compression from bladder mass [de-identified] : Reviewed EKG Aug 2023, WNL

## 2023-12-22 NOTE — END OF VISIT
[] : Resident [FreeTextEntry3] : BP is in acceptable rang without meds, may improved after surgery if this is impacted renal function and fluid retention.

## 2023-12-22 NOTE — PHYSICAL EXAM
[No Respiratory Distress] : no respiratory distress  [Normal Rate] : normal rate  [Regular Rhythm] : with a regular rhythm [de-identified] : swelling of RLE > LLE

## 2023-12-22 NOTE — HISTORY OF PRESENT ILLNESS
[No Pertinent Cardiac History] : no history of aortic stenosis, atrial fibrillation, coronary artery disease, recent myocardial infarction, or implantable device/pacemaker [No Pertinent Pulmonary History] : no history of asthma, COPD, sleep apnea, or smoking [No Adverse Anesthesia Reaction] : no adverse anesthesia reaction in self or family member [Diabetes] : diabetes [(Patient denies any chest pain, claudication, dyspnea on exertion, orthopnea, palpitations or syncope)] : Patient denies any chest pain, claudication, dyspnea on exertion, orthopnea, palpitations or syncope [Moderate (4-6 METs)] : Moderate (4-6 METs) [Chronic Anticoagulation] : no chronic anticoagulation [Chronic Kidney Disease] : no chronic kidney disease [FreeTextEntry1] : Cystoscopy with Biopsy [FreeTextEntry2] : 01/05/24 [FreeTextEntry3] : Tyrel [FreeTextEntry4] : 78F with PMHx of bladder diverticulum, T2DM, pelvic mass with concern for SCC vs UCC, RLE edema, here for pre-operative visit for cystoscopy and random bladder biopsies, left retrograde pyelogram and possible ureteroscopy and biopsy. Patient has not had any chest pain, SOB, NUNN, does report difficulty ambulating but this is attributed to swelling in RLE from bladder mass. [FreeTextEntry7] : EKG and Echo 8/2023 - WNL

## 2023-12-26 DIAGNOSIS — Z01.818 ENCOUNTER FOR OTHER PREPROCEDURAL EXAMINATION: ICD-10-CM

## 2023-12-26 DIAGNOSIS — I10 ESSENTIAL (PRIMARY) HYPERTENSION: ICD-10-CM

## 2023-12-26 DIAGNOSIS — R19.00 INTRA-ABDOMINAL AND PELVIC SWELLING, MASS AND LUMP, UNSPECIFIED SITE: ICD-10-CM

## 2024-01-04 ENCOUNTER — TRANSCRIPTION ENCOUNTER (OUTPATIENT)
Age: 79
End: 2024-01-04

## 2024-01-05 ENCOUNTER — RESULT REVIEW (OUTPATIENT)
Age: 79
End: 2024-01-05

## 2024-01-05 ENCOUNTER — OUTPATIENT (OUTPATIENT)
Dept: OUTPATIENT SERVICES | Facility: HOSPITAL | Age: 79
LOS: 1 days | Discharge: HOME | End: 2024-01-05
Payer: SELF-PAY

## 2024-01-05 ENCOUNTER — APPOINTMENT (OUTPATIENT)
Dept: UROLOGY | Facility: HOSPITAL | Age: 79
End: 2024-01-05

## 2024-01-05 ENCOUNTER — TRANSCRIPTION ENCOUNTER (OUTPATIENT)
Age: 79
End: 2024-01-05

## 2024-01-05 VITALS
RESPIRATION RATE: 16 BRPM | HEART RATE: 78 BPM | SYSTOLIC BLOOD PRESSURE: 171 MMHG | HEIGHT: 61 IN | DIASTOLIC BLOOD PRESSURE: 77 MMHG | TEMPERATURE: 98 F | OXYGEN SATURATION: 99 % | WEIGHT: 134.26 LBS

## 2024-01-05 VITALS — HEART RATE: 82 BPM | DIASTOLIC BLOOD PRESSURE: 73 MMHG | SYSTOLIC BLOOD PRESSURE: 150 MMHG

## 2024-01-05 DIAGNOSIS — N32.3 DIVERTICULUM OF BLADDER: Chronic | ICD-10-CM

## 2024-01-05 DIAGNOSIS — Z98.890 OTHER SPECIFIED POSTPROCEDURAL STATES: Chronic | ICD-10-CM

## 2024-01-05 DIAGNOSIS — Z01.818 ENCOUNTER FOR OTHER PREPROCEDURAL EXAMINATION: ICD-10-CM

## 2024-01-05 DIAGNOSIS — C80.1 MALIGNANT (PRIMARY) NEOPLASM, UNSPECIFIED: ICD-10-CM

## 2024-01-05 DIAGNOSIS — Z90.710 ACQUIRED ABSENCE OF BOTH CERVIX AND UTERUS: Chronic | ICD-10-CM

## 2024-01-05 LAB
GLUCOSE BLDC GLUCOMTR-MCNC: 130 MG/DL — HIGH (ref 70–99)
GLUCOSE BLDC GLUCOMTR-MCNC: 130 MG/DL — HIGH (ref 70–99)
GLUCOSE BLDC GLUCOMTR-MCNC: 77 MG/DL — SIGNIFICANT CHANGE UP (ref 70–99)
GLUCOSE BLDC GLUCOMTR-MCNC: 77 MG/DL — SIGNIFICANT CHANGE UP (ref 70–99)

## 2024-01-05 PROCEDURE — C1769: CPT

## 2024-01-05 PROCEDURE — 52351 CYSTOURETERO & OR PYELOSCOPE: CPT | Mod: RT

## 2024-01-05 PROCEDURE — 74420 UROGRAPHY RTRGR +-KUB: CPT | Mod: 26

## 2024-01-05 PROCEDURE — 76000 FLUOROSCOPY <1 HR PHYS/QHP: CPT

## 2024-01-05 PROCEDURE — 82962 GLUCOSE BLOOD TEST: CPT

## 2024-01-05 PROCEDURE — 57420 EXAM OF VAGINA W/SCOPE: CPT

## 2024-01-05 PROCEDURE — 88112 CYTOPATH CELL ENHANCE TECH: CPT

## 2024-01-05 PROCEDURE — 52351 CYSTOURETERO & OR PYELOSCOPE: CPT

## 2024-01-05 PROCEDURE — 88112 CYTOPATH CELL ENHANCE TECH: CPT | Mod: 26

## 2024-01-05 PROCEDURE — C1758: CPT

## 2024-01-05 DEVICE — URETERAL CATH OPEN END 5FR 70CM
Type: IMPLANTABLE DEVICE | Site: RIGHT | Status: NON-FUNCTIONAL
Removed: 2024-01-05

## 2024-01-05 DEVICE — GUIDEWIRE SENSOR DUAL-FLEX NITINOL STRAIGHT .035" X 150CM
Type: IMPLANTABLE DEVICE | Site: RIGHT | Status: NON-FUNCTIONAL
Removed: 2024-01-05

## 2024-01-05 DEVICE — GWIRE ANGLE 0.035INX3CM
Type: IMPLANTABLE DEVICE | Site: RIGHT | Status: NON-FUNCTIONAL
Removed: 2024-01-05

## 2024-01-05 RX ORDER — CEPHALEXIN 500 MG
1 CAPSULE ORAL
Qty: 20 | Refills: 0
Start: 2024-01-05 | End: 2024-01-09

## 2024-01-05 RX ORDER — ONDANSETRON 8 MG/1
4 TABLET, FILM COATED ORAL ONCE
Refills: 0 | Status: DISCONTINUED | OUTPATIENT
Start: 2024-01-05 | End: 2024-01-05

## 2024-01-05 RX ORDER — ACETAMINOPHEN 500 MG
1000 TABLET ORAL EVERY 6 HOURS
Refills: 0 | Status: DISCONTINUED | OUTPATIENT
Start: 2024-01-05 | End: 2024-01-19

## 2024-01-05 RX ORDER — SODIUM CHLORIDE 9 MG/ML
1000 INJECTION, SOLUTION INTRAVENOUS
Refills: 0 | Status: DISCONTINUED | OUTPATIENT
Start: 2024-01-05 | End: 2024-01-19

## 2024-01-05 RX ORDER — SODIUM CHLORIDE 9 MG/ML
3 INJECTION INTRAMUSCULAR; INTRAVENOUS; SUBCUTANEOUS EVERY 8 HOURS
Refills: 0 | Status: DISCONTINUED | OUTPATIENT
Start: 2024-01-05 | End: 2024-01-05

## 2024-01-05 RX ORDER — SODIUM CHLORIDE 9 MG/ML
1000 INJECTION, SOLUTION INTRAVENOUS
Refills: 0 | Status: DISCONTINUED | OUTPATIENT
Start: 2024-01-05 | End: 2024-01-05

## 2024-01-05 RX ORDER — LIDOCAINE HCL 20 MG/ML
0.2 VIAL (ML) INJECTION ONCE
Refills: 0 | Status: DISCONTINUED | OUTPATIENT
Start: 2024-01-05 | End: 2024-01-05

## 2024-01-05 RX ORDER — CEFAZOLIN SODIUM 1 G
2000 VIAL (EA) INJECTION ONCE
Refills: 0 | Status: COMPLETED | OUTPATIENT
Start: 2024-01-05 | End: 2024-01-05

## 2024-01-05 RX ORDER — HYDROMORPHONE HYDROCHLORIDE 2 MG/ML
0.5 INJECTION INTRAMUSCULAR; INTRAVENOUS; SUBCUTANEOUS
Refills: 0 | Status: DISCONTINUED | OUTPATIENT
Start: 2024-01-05 | End: 2024-01-05

## 2024-01-05 RX ADMIN — SODIUM CHLORIDE 100 MILLILITER(S): 9 INJECTION, SOLUTION INTRAVENOUS at 13:30

## 2024-01-05 NOTE — ASU PATIENT PROFILE, ADULT - FALL HARM RISK - UNIVERSAL INTERVENTIONS
Bed in lowest position, wheels locked, appropriate side rails in place/Call bell, personal items and telephone in reach/Instruct patient to call for assistance before getting out of bed or chair/Non-slip footwear when patient is out of bed/[place] to call system/Physically safe environment - no spills, clutter or unnecessary equipment/Purposeful Proactive Rounding/Room/bathroom lighting operational, light cord in reach Bed in lowest position, wheels locked, appropriate side rails in place/Call bell, personal items and telephone in reach/Instruct patient to call for assistance before getting out of bed or chair/Non-slip footwear when patient is out of bed/[place] to call system/Physically safe environment - no spills, clutter or unnecessary equipment/Purposeful Proactive Rounding/Room/bathroom lighting operational, light cord in reach

## 2024-01-05 NOTE — ASU DISCHARGE PLAN (ADULT/PEDIATRIC) - PROVIDER TOKENS
PROVIDER:[TOKEN:[[id number]],FOLLOWUP:[1 week]] PROVIDER:[TOKEN:[[id number]],FOLLOWUP:[1 week]]

## 2024-01-05 NOTE — ASU PATIENT PROFILE, ADULT - FALL HARM RISK - RISK INTERVENTIONS
Assistance OOB with selected safe patient handling equipment/Assistance with ambulation/Communicate Fall Risk and Risk Factors to all staff, patient, and family/Discuss with provider need for PT consult/Monitor gait and stability/Provide patient with walking aids - walker, cane, crutches/Reinforce activity limits and safety measures with patient and family/Visual Cue: Yellow wristband/Bed in lowest position, wheels locked, appropriate side rails in place/Call bell, personal items and telephone in reach/Instruct patient to call for assistance before getting out of bed or chair/Non-slip footwear when patient is out of bed/[place] to call system/Physically safe environment - no spills, clutter or unnecessary equipment/Purposeful Proactive Rounding/Room/bathroom lighting operational, light cord in reach Assistance OOB with selected safe patient handling equipment/Assistance with ambulation/Communicate Fall Risk and Risk Factors to all staff, patient, and family/Discuss with provider need for PT consult/Monitor gait and stability/Provide patient with walking aids - walker, cane, crutches/Reinforce activity limits and safety measures with patient and family/Visual Cue: Yellow wristband/Bed in lowest position, wheels locked, appropriate side rails in place/Call bell, personal items and telephone in reach/Instruct patient to call for assistance before getting out of bed or chair/Non-slip footwear when patient is out of bed/[place] to call system/Physically safe environment - no spills, clutter or unnecessary equipment/Purposeful Proactive Rounding/Room/bathroom lighting operational, light cord in reach

## 2024-01-05 NOTE — ASU DISCHARGE PLAN (ADULT/PEDIATRIC) - NS MD DC FALL RISK RISK
For information on Fall & Injury Prevention, visit: [email or web address] OR  [email or web address]-[email or web address]-injury OR  https://www.cdc.gov/steadi/patient.html For information on Fall & Injury Prevention, visit: https://www.[facility]/news/fall-prevention-protects-and-maintains-health-and-mobility OR  https://www.[facility]/news/fall-prevention-tips-to-avoid-injury OR  https://www.cdc.gov/steadi/patient.html

## 2024-01-05 NOTE — ASU DISCHARGE PLAN (ADULT/PEDIATRIC) - CARE PROVIDER_API CALL
Kai Sarah  Urology  [address] [place] [address], [address]  [place] [address]  Phone: [phone]  Fax: [phone]  Follow Up Time: 1 week   Kai Sarah  Urology  [address] [place] [address], [address]  [place] [address]  Phone: [phone]  Fax: [phone]  Follow Up Time: 1 week

## 2024-01-05 NOTE — ASU PATIENT PROFILE, ADULT - CAREGIVER
Additional Notes: Pt just recently had a fibroblast procedure around the eyes and Is swollen today \\nAvoided javier orbital area photos taken Detail Level: Simple Render Risk Assessment In Note?: no No

## 2024-01-05 NOTE — ASU DISCHARGE PLAN (ADULT/PEDIATRIC) - ASU DC SPECIAL INSTRUCTIONSFT
Discharge Instructions: Ureteroscopy    · Stent: You do not have a stent for this procedure.    · General: It is common to have blood in the urine after your procedure. It may be pink or even red; inform your doctor if you have a significant amount of clot in the urine or if you are unable to void at all. The urine may clear and then become bloody again especially as you are more physically active.    · Bathing: You may shower or bathe.    · Diet: You may resume your regular diet and regular medication regimen.    · Pain: You may take Tylenol (acetaminophen) 650-975mg and/or Motrin (ibuprofen) 400-600mg, available over the counter, for pain every 6 hours as needed. Do not exceed 4000 milligrams of Tylenol (acetaminophen) daily. You may alternate these medications such that you take either one every 3 hours.    · Antibiotics: You have been given a prescription for an antibiotic, please take this medication as instructed and be sure to complete entire course.    · Stool softeners: Do not allow yourself to become constipated as this may increase your bother from the stent and/or straining may cause bleeding. Take stool softeners (ex. Colace) or a laxative (ex. Senekot, ExLax), available over the counter, if needed.    · Activity: No heavy lifting or strenuous exercise until you are evaluated at your post-operative appointment. Otherwise, you may return to your usual level of activity.    · Anticoagulation: If you are taking any blood thinning medications, please discuss with your urologist prior to restarting these medications unless otherwise specified.    · Follow-up: If you did not already schedule your post-operative appointment, please call your urologist to schedule a follow-up appointment.    · Call your urologist if: You have any bleeding that does not stop, inability to void >8 hours, fever over 100.4 F, chills, persistent nausea/vomiting, or if your pain is not controlled on your discharge pain medications.

## 2024-01-05 NOTE — ASU DISCHARGE PLAN (ADULT/PEDIATRIC) - NURSING INSTRUCTIONS
******************************************************************************************  Next dose of TYLENOL may be taken at or after 6pm PM if needed. DO NOT take any additional products containing TYLENOL or ACETAMINOPHEN, such as VICODIN, PERCOCET, NORCO, EXCEDRIN, and any over-the-counter cold medications until this time. DO NOT CONSUME MORE THAN 6608-0337 MG of TYLENOL (acetaminophen) in a 24-hour period. ******************************************************************************************  Next dose of TYLENOL may be taken at or after 6pm PM if needed. DO NOT take any additional products containing TYLENOL or ACETAMINOPHEN, such as VICODIN, PERCOCET, NORCO, EXCEDRIN, and any over-the-counter cold medications until this time. DO NOT CONSUME MORE THAN 0476-6166 MG of TYLENOL (acetaminophen) in a 24-hour period.

## 2024-01-17 ENCOUNTER — NON-APPOINTMENT (OUTPATIENT)
Age: 79
End: 2024-01-17

## 2024-01-19 ENCOUNTER — OUTPATIENT (OUTPATIENT)
Dept: OUTPATIENT SERVICES | Facility: HOSPITAL | Age: 79
LOS: 1 days | End: 2024-01-19
Payer: MEDICARE

## 2024-01-19 ENCOUNTER — APPOINTMENT (OUTPATIENT)
Dept: UROLOGY | Facility: CLINIC | Age: 79
End: 2024-01-19
Payer: MEDICARE

## 2024-01-19 DIAGNOSIS — Z90.710 ACQUIRED ABSENCE OF BOTH CERVIX AND UTERUS: Chronic | ICD-10-CM

## 2024-01-19 DIAGNOSIS — N32.3 DIVERTICULUM OF BLADDER: Chronic | ICD-10-CM

## 2024-01-19 DIAGNOSIS — Z98.890 OTHER SPECIFIED POSTPROCEDURAL STATES: Chronic | ICD-10-CM

## 2024-01-19 DIAGNOSIS — R19.00 INTRA-ABDOMINAL AND PELVIC SWELLING, MASS AND LUMP, UNSPECIFIED SITE: ICD-10-CM

## 2024-01-19 DIAGNOSIS — R35.0 FREQUENCY OF MICTURITION: ICD-10-CM

## 2024-01-19 PROCEDURE — G0463: CPT

## 2024-01-19 PROCEDURE — 99214 OFFICE O/P EST MOD 30 MIN: CPT

## 2024-01-19 NOTE — ASSESSMENT
[FreeTextEntry1] : 77 yo F with PMhx of salpingoopherectomy and hysterectomy, DMII, HTN, HLD, recently hospitalized after falling, seen by urology for urinary retention. CT showing a 12 cm diverticulum causing right hydroureteronephrosis, right kidney looks atrophic on CT scan. Renal lasix scan with right kidney with 4% function. Now presenting with right lymph node positive for possible for SCC vs urothelial carcinoma. CT in october with evidence of new pelvic and paraortic lymphadenopathy. Underwent cystoscopy, cystogram without signs of bladder diverticulum or other abnormalities. From discussion at  tumor board, mass likely gynecologic in origin.  - Pelvic MRI scheduled for January 30 - will discuss results with patient - Patient to follow up with gynecology following MRI

## 2024-01-19 NOTE — HISTORY OF PRESENT ILLNESS
[FreeTextEntry1] : 79 yo F with PMhx of salpingoopherectomy and hysterectomy, DMII, HTN, HLD, recently hospitalized after falling, seen by urology for urinary retention. Ayala was placed in the hospital and was left prior to discharge where she did not pass TOV and is following up with us here today. US during her hospitalization was significant for right hydronephrosis and a large right bladder diverticulum, creatinine was downtrending after ayala placement. She denies history of retention, dysuria, fevers, chills. Has never had trouble voiding before her fall.  TOV was attempted in clinic. Patient spontaneously voided but unfortunately void was not recorded. Bladder scan following the unrecorded void showed 600cc of urine in her bladder, she was subsequently recatheterized with only 50cc of urine return. The ayala was irrigated, not significant for obstruction. The patient was not in discomfort and felt like her bladder was empty.  8/4/23: Patient returns after CT renal stone hunt significant for a 12cm diverticulum causing right hydroureteronephrosis. Patient is comfortable, not endorsing issues.  8/12: Renal lasix scan on 8/8 with minimal flow to and function of the right kidney. Function is so poor that it is not possible to determine the likelihood of obstruction. Normal flow to and function of the left kidney with no evidence of obstruction. Differential renal function: Right kidney: 4%; Left kidney: 96%. Results reviewed with patient. CT reviewed redemonstrating 12cm diverticulum obstructing the right kidney causing loss of function over time. Vaginal exam with no signs of malignancy or mechanical obstruction. Otherwise patient feels comfortable, has been voiding well, no complaints.  12/1: Patient returns for follow-up. Underwent cystoscopy on 9.29 where she was noted to have a large 12cm bladder diverticulum already known (first seen on CT in July) . No tumors were noted in the bladder at this time. She had been doing well until about mid october where she presented to the ER for new right lower extremity swelling. She had a CT done at that time which still demonstrated the bladder diverticulum but now noted new pelvic and periaortic lymphadenopathy concerning for malignancy that was not seen on her CT back in July. On 11/9 she underwent a CT guided lymph node biopsy and was positive for stains consistent with either squamous cell carcinoma or urothelial carcinoma. Patient now presents today for further evaluation. States she has been doing well. Voiding without difficulty. no urinary tract infections. No gross hematuria or flank pain. Only complaint is the continued right lower extremity swelling.  1/19/24: Patient presents for follow up. Underwent cystoscopy, cystogram, ureteroscopy, vaginoscopy with Dr. Sarah in [place]. No evidence of bladder diverticulum, and no connection to mass visualized in collecting system. Patient discussed at  tumor board, and believed to be gynecologic in origin. Pelvic MRI ordered and discussed with patient on January 10 - scheduled for January 30. Feels well after surgery without urinary symptoms. Denies gross hematuria, flank pain, weight loss. Reports R LE since September which has been stable. Elevates leg when sitting down and when asleep.

## 2024-01-30 ENCOUNTER — RESULT REVIEW (OUTPATIENT)
Age: 79
End: 2024-01-30

## 2024-01-30 ENCOUNTER — APPOINTMENT (OUTPATIENT)
Dept: MRI IMAGING | Facility: IMAGING CENTER | Age: 79
End: 2024-01-30
Payer: MEDICARE

## 2024-01-30 ENCOUNTER — OUTPATIENT (OUTPATIENT)
Dept: OUTPATIENT SERVICES | Facility: HOSPITAL | Age: 79
LOS: 1 days | End: 2024-01-30
Payer: MEDICARE

## 2024-01-30 DIAGNOSIS — R19.00 INTRA-ABDOMINAL AND PELVIC SWELLING, MASS AND LUMP, UNSPECIFIED SITE: ICD-10-CM

## 2024-01-30 DIAGNOSIS — Z98.890 OTHER SPECIFIED POSTPROCEDURAL STATES: Chronic | ICD-10-CM

## 2024-01-30 DIAGNOSIS — Z90.710 ACQUIRED ABSENCE OF BOTH CERVIX AND UTERUS: Chronic | ICD-10-CM

## 2024-01-30 DIAGNOSIS — N32.3 DIVERTICULUM OF BLADDER: Chronic | ICD-10-CM

## 2024-01-30 PROCEDURE — 72197 MRI PELVIS W/O & W/DYE: CPT

## 2024-01-30 PROCEDURE — A9585: CPT

## 2024-01-30 PROCEDURE — 72197 MRI PELVIS W/O & W/DYE: CPT | Mod: 26,MH

## 2024-02-16 ENCOUNTER — NON-APPOINTMENT (OUTPATIENT)
Age: 79
End: 2024-02-16

## 2024-02-20 ENCOUNTER — NON-APPOINTMENT (OUTPATIENT)
Age: 79
End: 2024-02-20

## 2024-02-21 ENCOUNTER — RESULT REVIEW (OUTPATIENT)
Age: 79
End: 2024-02-21

## 2024-02-21 ENCOUNTER — APPOINTMENT (OUTPATIENT)
Dept: GYNECOLOGIC ONCOLOGY | Facility: HOSPITAL | Age: 79
End: 2024-02-21
Payer: MEDICARE

## 2024-02-21 ENCOUNTER — OUTPATIENT (OUTPATIENT)
Dept: OUTPATIENT SERVICES | Facility: HOSPITAL | Age: 79
LOS: 1 days | End: 2024-02-21

## 2024-02-21 VITALS
DIASTOLIC BLOOD PRESSURE: 69 MMHG | HEIGHT: 61 IN | TEMPERATURE: 97.1 F | SYSTOLIC BLOOD PRESSURE: 152 MMHG | WEIGHT: 140 LBS | BODY MASS INDEX: 26.43 KG/M2 | HEART RATE: 79 BPM

## 2024-02-21 DIAGNOSIS — Z98.890 OTHER SPECIFIED POSTPROCEDURAL STATES: Chronic | ICD-10-CM

## 2024-02-21 DIAGNOSIS — R39.15 URGENCY OF URINATION: ICD-10-CM

## 2024-02-21 LAB
ALBUMIN SERPL ELPH-MCNC: 4.6 G/DL — SIGNIFICANT CHANGE UP (ref 3.3–5)
ALP SERPL-CCNC: 77 U/L — SIGNIFICANT CHANGE UP (ref 40–120)
ALT FLD-CCNC: 8 U/L — SIGNIFICANT CHANGE UP (ref 4–33)
ANION GAP SERPL CALC-SCNC: 11 MMOL/L — SIGNIFICANT CHANGE UP (ref 7–14)
APPEARANCE UR: CLEAR — SIGNIFICANT CHANGE UP
AST SERPL-CCNC: 25 U/L — SIGNIFICANT CHANGE UP (ref 4–32)
BACTERIA # UR AUTO: NEGATIVE /HPF — SIGNIFICANT CHANGE UP
BILIRUB SERPL-MCNC: 0.4 MG/DL — SIGNIFICANT CHANGE UP (ref 0.2–1.2)
BILIRUB UR-MCNC: NEGATIVE — SIGNIFICANT CHANGE UP
BUN SERPL-MCNC: 25 MG/DL — HIGH (ref 7–23)
CALCIUM SERPL-MCNC: 9.9 MG/DL — SIGNIFICANT CHANGE UP (ref 8.4–10.5)
CAST: 0 /LPF — SIGNIFICANT CHANGE UP (ref 0–4)
CHLORIDE SERPL-SCNC: 104 MMOL/L — SIGNIFICANT CHANGE UP (ref 98–107)
CO2 SERPL-SCNC: 25 MMOL/L — SIGNIFICANT CHANGE UP (ref 22–31)
COLOR SPEC: YELLOW — SIGNIFICANT CHANGE UP
CREAT SERPL-MCNC: 1.51 MG/DL — HIGH (ref 0.5–1.3)
DIFF PNL FLD: NEGATIVE — SIGNIFICANT CHANGE UP
EGFR: 35 ML/MIN/1.73M2 — LOW
GLUCOSE SERPL-MCNC: 95 MG/DL — SIGNIFICANT CHANGE UP (ref 70–99)
GLUCOSE UR QL: NEGATIVE MG/DL — SIGNIFICANT CHANGE UP
HCT VFR BLD CALC: 35.1 % — SIGNIFICANT CHANGE UP (ref 34.5–45)
HGB BLD-MCNC: 11 G/DL — LOW (ref 11.5–15.5)
KETONES UR-MCNC: NEGATIVE MG/DL — SIGNIFICANT CHANGE UP
LEUKOCYTE ESTERASE UR-ACNC: ABNORMAL
MCHC RBC-ENTMCNC: 30.6 PG — SIGNIFICANT CHANGE UP (ref 27–34)
MCHC RBC-ENTMCNC: 31.3 GM/DL — LOW (ref 32–36)
MCV RBC AUTO: 97.5 FL — SIGNIFICANT CHANGE UP (ref 80–100)
NITRITE UR-MCNC: NEGATIVE — SIGNIFICANT CHANGE UP
NRBC # BLD: 0 /100 WBCS — SIGNIFICANT CHANGE UP (ref 0–0)
NRBC # FLD: 0 K/UL — SIGNIFICANT CHANGE UP (ref 0–0)
PH UR: 6 — SIGNIFICANT CHANGE UP (ref 5–8)
PLATELET # BLD AUTO: 231 K/UL — SIGNIFICANT CHANGE UP (ref 150–400)
POTASSIUM SERPL-MCNC: 4.5 MMOL/L — SIGNIFICANT CHANGE UP (ref 3.5–5.3)
POTASSIUM SERPL-SCNC: 4.5 MMOL/L — SIGNIFICANT CHANGE UP (ref 3.5–5.3)
PROT SERPL-MCNC: 9.3 G/DL — HIGH (ref 6–8.3)
PROT UR-MCNC: 30 MG/DL
RBC # BLD: 3.6 M/UL — LOW (ref 3.8–5.2)
RBC # FLD: 12.9 % — SIGNIFICANT CHANGE UP (ref 10.3–14.5)
RBC CASTS # UR COMP ASSIST: 0 /HPF — SIGNIFICANT CHANGE UP (ref 0–4)
SODIUM SERPL-SCNC: 140 MMOL/L — SIGNIFICANT CHANGE UP (ref 135–145)
SP GR SPEC: 1.02 — SIGNIFICANT CHANGE UP (ref 1–1.03)
SQUAMOUS # UR AUTO: 0 /HPF — SIGNIFICANT CHANGE UP (ref 0–5)
UROBILINOGEN FLD QL: 0.2 MG/DL — SIGNIFICANT CHANGE UP (ref 0.2–1)
WBC # BLD: 5.34 K/UL — SIGNIFICANT CHANGE UP (ref 3.8–10.5)
WBC # FLD AUTO: 5.34 K/UL — SIGNIFICANT CHANGE UP (ref 3.8–10.5)
WBC UR QL: 1 /HPF — SIGNIFICANT CHANGE UP (ref 0–5)

## 2024-02-21 PROCEDURE — 99213 OFFICE O/P EST LOW 20 MIN: CPT | Mod: GC

## 2024-02-22 ENCOUNTER — APPOINTMENT (OUTPATIENT)
Dept: RADIATION ONCOLOGY | Facility: CLINIC | Age: 79
End: 2024-02-22
Payer: MEDICARE

## 2024-02-22 VITALS
HEART RATE: 78 BPM | TEMPERATURE: 97.9 F | SYSTOLIC BLOOD PRESSURE: 181 MMHG | RESPIRATION RATE: 16 BRPM | OXYGEN SATURATION: 97 % | HEIGHT: 61 IN | DIASTOLIC BLOOD PRESSURE: 85 MMHG | WEIGHT: 138.01 LBS | BODY MASS INDEX: 26.06 KG/M2

## 2024-02-22 DIAGNOSIS — R39.15 URGENCY OF URINATION: ICD-10-CM

## 2024-02-22 DIAGNOSIS — R19.00 INTRA-ABDOMINAL AND PELVIC SWELLING, MASS AND LUMP, UNSPECIFIED SITE: ICD-10-CM

## 2024-02-22 DIAGNOSIS — C77.9 SECONDARY AND UNSPECIFIED MALIGNANT NEOPLASM OF LYMPH NODE, UNSPECIFIED: ICD-10-CM

## 2024-02-22 DIAGNOSIS — N13.5 CROSSING VESSEL AND STRICTURE OF URETER WITHOUT HYDRONEPHROSIS: ICD-10-CM

## 2024-02-22 LAB
CULTURE RESULTS: SIGNIFICANT CHANGE UP
SPECIMEN SOURCE: SIGNIFICANT CHANGE UP

## 2024-02-22 PROCEDURE — 99204 OFFICE O/P NEW MOD 45 MIN: CPT

## 2024-02-22 RX ORDER — ROSUVASTATIN CALCIUM 5 MG/1
5 TABLET, FILM COATED ORAL DAILY
Qty: 90 | Refills: 3 | Status: DISCONTINUED | COMMUNITY
Start: 2023-07-12 | End: 2024-02-22

## 2024-02-22 NOTE — REVIEW OF SYSTEMS
[Lower Ext Edema] : lower extremity edema [Constipation] : constipation [Joint Pain] : joint pain [Negative] : Allergic/Immunologic

## 2024-03-06 NOTE — VITALS
[Least Pain Intensity: 1/10] : 1/10 [Maximal Pain Intensity: 7/10] : 7/10 [Pain Location: ___] : Pain Location: [unfilled] [Pain Duration: ___] : Pain duration: [unfilled] [NoTreatment Scheduled] : no treatment scheduled [90: Able to carry normal activity; minor signs or symptoms of disease.] : 90: Able to carry normal activity; minor signs or symptoms of disease.  [Date: ____________] : Patient's last distress assessment performed on [unfilled]. [1 - Distress Level] : Distress Level: 1 [70: Cares for self; unalbe to carry on normal activity or do active work.] : 70: Cares for self; unable to carry on normal activity or do active work.

## 2024-03-06 NOTE — DISEASE MANAGEMENT
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

## 2024-03-06 NOTE — PHYSICAL EXAM
[] : no respiratory distress [Normal] : oriented to person, place and time, the affect was normal, the mood was normal and not anxious [Abdomen Soft] : soft [Normal External Genitalia] : normal external genitalia  [de-identified] : normal vagina no cerix clearly identified [de-identified] : bilateral lower lef edema rt>left

## 2024-03-06 NOTE — HISTORY OF PRESENT ILLNESS
[FreeTextEntry1] : This is a 78 year old female with pelvic mass.   Pt presented to ED s/p mechanical fall on 6/29/23. At this time, patient was noted to have an IRENE and urinary retention, with pelvic mass noted on imaging. Urology workup concerning for 12cm diverticulum and non-functioning left kidney. CT A/P on 7/25/23: 12 cm cystic structure in the pelvis which appears to be a right bladder diverticulum which is located superior to the urinary bladder and compresses the urinary bladder and appears to obstruct the right ureter causing right hydronephrosis and right hydroureter.  In October, 2023, pt noted swelling of RLE and was referred to the ED.  CT A/P on 10/17/23 demonstrated 17cm pelvic mass. Increase in size of thick-walled, fluid distended pelvic structure. Pelvic and periaortic lymphadenopathy, concerning for pelvic malignancy.   Pt underwent CT guided right pelvic lymph node biopsy on 11/9/23; pathology: SCC vs urothelial carcinoma.   On 1/5/24, pt underwent cysto, R ureteroscopy, retrograde pyelogram, vaginoscopy. No evidence of bladder diverticulum, and no connection to mass visualized in collecting system. Urine negative for high grade urothelial carcinoma. Patient discussed at  tumor board, believed to be gynecologic in origin.  Pelvic MR on 1/30/24: S/p hysterectomy per history. Complex solid and cystic structure 19cm w/ rind of soft tissue measuring up to 1.7cm in thickness w/ enhancement and diffusion restriction abutting/contiguous with vaginal cuff. Previously measuring 17cm. Multiple enlarged centrally necrotic pelvic LAD. Stable severe R hydronephrosis 2/2 mass effect.  Pt discussed at GYN [facility] Tumor Board on 1/17/24. 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 Plan for pelvic RT, possible cis/RT pending kidney function/tolerability.  She presents today to discuss radiation treatment options. She is accompanied by her granddaughter. She is generally feeling well, main complaint is right knee pain and shoulder pain. She has no abdominal or pelvic pain, no vaginal bleeding, no urinary complaints, constipation managed with laxatives.    PMHx: DM Type 2, thyroid goiter, HTN, HLD, Renal Dysfunction Surgical Hx: L Breast Lumpectomy, Total Vaginal Hysterectomy (?fibroids) No significant family history

## 2024-03-15 ENCOUNTER — OUTPATIENT (OUTPATIENT)
Dept: OUTPATIENT SERVICES | Facility: HOSPITAL | Age: 79
LOS: 1 days | End: 2024-03-15
Payer: MEDICARE

## 2024-03-15 ENCOUNTER — APPOINTMENT (OUTPATIENT)
Dept: ULTRASOUND IMAGING | Facility: IMAGING CENTER | Age: 79
End: 2024-03-15
Payer: MEDICARE

## 2024-03-15 DIAGNOSIS — Z98.890 OTHER SPECIFIED POSTPROCEDURAL STATES: Chronic | ICD-10-CM

## 2024-03-15 DIAGNOSIS — N32.3 DIVERTICULUM OF BLADDER: Chronic | ICD-10-CM

## 2024-03-15 DIAGNOSIS — M79.89 OTHER SPECIFIED SOFT TISSUE DISORDERS: ICD-10-CM

## 2024-03-15 DIAGNOSIS — Z90.710 ACQUIRED ABSENCE OF BOTH CERVIX AND UTERUS: Chronic | ICD-10-CM

## 2024-03-15 PROCEDURE — 93970 EXTREMITY STUDY: CPT

## 2024-03-15 PROCEDURE — 93970 EXTREMITY STUDY: CPT | Mod: 26

## 2024-03-21 NOTE — ED ADULT TRIAGE NOTE - MODE OF ARRIVAL
Dear Stormy Hermosillo,     It was our pleasure to care for you here at [facility].  It is our hope that we were always able to exceed the expected standards for your care during your stay.  You were hospitalized due to pulmonary embolism and DVT.  You were cared for on the medince floor by Isaura Rutledge MD under the service of Nicolas Boone MD with the [facility] who covers for your primary care physician (PCP), Mateo Mahoney DO, while you were hospitalized.  If you have any questions or concerns related to this hospitalization, you may contact us at .  For follow up as well as any medication refills, we recommend that you follow up with your primary care physician.  A registered nurse will reach out to you by phone within a few days after your discharge to answer any additional questions that you may have after going home.  However, at this time we provide for you here, the most important instructions / recommendations at discharge:     Notable Medication Adjustments -   START Eliquis 10 mg twice daily until Wednesday, March 27, then decrease to 5 mg twice a day thereafter  START metoprolol 25 mg daily  Testing Required after Discharge -   none  ** Please contact your PCP to request testing orders for any of the testing recommended here **  Important follow up information -   Follow-up with your PCP in 1 week  Follow-up with cardiology as an outpatient  Follow up with orthopedics as an outpatient   Other Instructions -   Take all medications as indicated  If any new or worsening symptoms, contact your PCP or go to the nearest emergency department  Please review this entire after visit summary as additional general instructions including medication list, appointments, activity, diet, any pertinent wound care, and other additional recommendations from your care team that may be provided for you.      Sincerely,     Isaura Rutledge MD     [facility]

## 2024-03-22 ENCOUNTER — NON-APPOINTMENT (OUTPATIENT)
Age: 79
End: 2024-03-22

## 2024-03-29 ENCOUNTER — TRANSCRIPTION ENCOUNTER (OUTPATIENT)
Age: 79
End: 2024-03-29

## 2024-05-10 ENCOUNTER — APPOINTMENT (OUTPATIENT)
Dept: INTERNAL MEDICINE | Facility: CLINIC | Age: 79
End: 2024-05-10
Payer: MEDICARE

## 2024-05-10 ENCOUNTER — OUTPATIENT (OUTPATIENT)
Dept: OUTPATIENT SERVICES | Facility: HOSPITAL | Age: 79
LOS: 1 days | End: 2024-05-10

## 2024-05-10 VITALS
HEIGHT: 61 IN | SYSTOLIC BLOOD PRESSURE: 144 MMHG | OXYGEN SATURATION: 97 % | DIASTOLIC BLOOD PRESSURE: 90 MMHG | WEIGHT: 128 LBS | HEART RATE: 75 BPM | BODY MASS INDEX: 24.17 KG/M2

## 2024-05-10 DIAGNOSIS — Z90.710 ACQUIRED ABSENCE OF BOTH CERVIX AND UTERUS: Chronic | ICD-10-CM

## 2024-05-10 DIAGNOSIS — Z01.818 ENCOUNTER FOR OTHER PREPROCEDURAL EXAMINATION: ICD-10-CM

## 2024-05-10 DIAGNOSIS — M79.89 OTHER SPECIFIED SOFT TISSUE DISORDERS: ICD-10-CM

## 2024-05-10 DIAGNOSIS — Z98.890 OTHER SPECIFIED POSTPROCEDURAL STATES: Chronic | ICD-10-CM

## 2024-05-10 DIAGNOSIS — R60.0 LOCALIZED EDEMA: ICD-10-CM

## 2024-05-10 DIAGNOSIS — N32.3 DIVERTICULUM OF BLADDER: Chronic | ICD-10-CM

## 2024-05-10 DIAGNOSIS — M25.511 PAIN IN RIGHT SHOULDER: ICD-10-CM

## 2024-05-10 PROCEDURE — G2211 COMPLEX E/M VISIT ADD ON: CPT

## 2024-05-10 PROCEDURE — 99204 OFFICE O/P NEW MOD 45 MIN: CPT | Mod: GC

## 2024-05-10 RX ORDER — ASPIRIN 81 MG/1
81 TABLET, CHEWABLE ORAL
Refills: 0 | Status: DISCONTINUED | COMMUNITY
Start: 2024-02-22 | End: 2024-05-10

## 2024-05-10 RX ORDER — LIDOCAINE 40 MG/G
4 PATCH TOPICAL
Qty: 1 | Refills: 0 | Status: ACTIVE | COMMUNITY
Start: 2023-07-11 | End: 1900-01-01

## 2024-05-10 RX ORDER — DICLOFENAC SODIUM 1% 10 MG/G
1 GEL TOPICAL DAILY
Qty: 1 | Refills: 2 | Status: ACTIVE | COMMUNITY
Start: 2023-08-18 | End: 1900-01-01

## 2024-05-10 RX ORDER — ENALAPRIL MALEATE 10 MG/1
10 TABLET ORAL
Refills: 0 | Status: DISCONTINUED | COMMUNITY
Start: 2024-02-22 | End: 2024-05-10

## 2024-05-10 RX ORDER — TAMSULOSIN HYDROCHLORIDE 0.4 MG/1
0.4 CAPSULE ORAL
Refills: 0 | Status: DISCONTINUED | COMMUNITY
End: 2024-05-10

## 2024-05-10 RX ORDER — HYDROCHLOROTHIAZIDE 25 MG/1
25 TABLET ORAL
Refills: 0 | Status: DISCONTINUED | COMMUNITY
Start: 2024-02-22 | End: 2024-05-10

## 2024-05-10 RX ORDER — SIMVASTATIN 20 MG/1
20 TABLET, FILM COATED ORAL
Refills: 0 | Status: DISCONTINUED | COMMUNITY
Start: 2024-02-22 | End: 2024-05-10

## 2024-05-10 RX ORDER — METFORMIN HYDROCHLORIDE 500 MG/1
500 TABLET, COATED ORAL
Refills: 0 | Status: DISCONTINUED | COMMUNITY
Start: 2024-02-22 | End: 2024-05-10

## 2024-05-10 NOTE — ASSESSMENT
[FreeTextEntry1] : Case discussed with Dr. Pavon  RTC in 3 months  [facility]  Internal Medicine PGY-1 [employer]

## 2024-05-10 NOTE — REVIEW OF SYSTEMS
[Lower Ext Edema] : lower extremity edema [Constipation] : constipation [Nocturia] : nocturia [Joint Pain] : joint pain [Muscle Pain] : muscle pain [Fever] : no fever [Chills] : no chills [Night Sweats] : no night sweats [Discharge] : no discharge [Vision Problems] : no vision problems [Earache] : no earache [Hearing Loss] : no hearing loss [Sore Throat] : no sore throat [Chest Pain] : no chest pain [Palpitations] : no palpitations [Orthopnea] : no orthopnea [Shortness Of Breath] : no shortness of breath [Wheezing] : no wheezing [Cough] : no cough [Abdominal Pain] : no abdominal pain [Nausea] : no nausea [Diarrhea] : diarrhea [Dysuria] : no dysuria [Incontinence] : no incontinence [Hematuria] : no hematuria [Back Pain] : no back pain [Itching] : no itching [Skin Rash] : no skin rash [Headache] : no headache [Dizziness] : no dizziness

## 2024-05-10 NOTE — HISTORY OF PRESENT ILLNESS
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  #Pelvic Mass  Found to be squamous cell carcinoma likely of gynecologic origin. Patient followed by med/onc and recently saw radiation oncology for consideration of RT therapy. Patient and daughter at bedside states they are still considering whether to pursue radiation therapy. Patient denies any constipation, diarrhea, abdominal pain or urinary issues.  #Right Knee pain and RLE  Patient with unilateral RLE up to the hips. DVT study has been negative. Patient reports improvement with leg elevation. Suspect that the edema is in setting of pelvic mass causing obstruction of lymphatics/vasculature. Patient also reports focal pain to her right knee. She reports taking Tylenol 1000mg BID for the pain with minimal relief. Patient states her knee is painful at nighttime which makes it difficult for her to sleep. Patient has not had any imaging of her knee done. She requests physical therapy referral.

## 2024-05-10 NOTE — PHYSICAL EXAM
[No Acute Distress] : no acute distress [Well Nourished] : well nourished [Well Developed] : well developed [Normal Sclera/Conjunctiva] : normal sclera/conjunctiva [PERRL] : pupils equal round and reactive to light [EOMI] : extraocular movements intact [Normal Outer Ear/Nose] : the outer ears and nose were normal in appearance [No JVD] : no jugular venous distention [Normal Rate] : normal rate  [Regular Rhythm] : with a regular rhythm [No Murmur] : no murmur heard [Soft] : abdomen soft [Non Tender] : non-tender [Non-distended] : non-distended [Grossly Normal Strength/Tone] : grossly normal strength/tone [No Rash] : no rash [de-identified] : Dentures present, bilaterally enlarged thyroid  [de-identified] : Right lower extremity pitting edema present throughout extremity, no left lower edema  [de-identified] : Full ROM of right knee

## 2024-05-13 DIAGNOSIS — R19.00 INTRA-ABDOMINAL AND PELVIC SWELLING, MASS AND LUMP, UNSPECIFIED SITE: ICD-10-CM

## 2024-05-13 DIAGNOSIS — M79.89 OTHER SPECIFIED SOFT TISSUE DISORDERS: ICD-10-CM

## 2024-05-13 RX ORDER — HYDROCODONE BITARTRATE AND ACETAMINOPHEN 5; 300 MG/1; MG/1
5-300 TABLET ORAL
Qty: 20 | Refills: 0 | Status: DISCONTINUED | COMMUNITY
Start: 2024-05-10 | End: 2024-05-13

## 2024-05-13 RX ORDER — ACETAMINOPHEN AND CODEINE PHOSPHATE 300; 15 MG/1; MG/1
300-15 TABLET ORAL
Qty: 12 | Refills: 0 | Status: ACTIVE | COMMUNITY
Start: 2024-05-13 | End: 1900-01-01

## 2024-05-21 ENCOUNTER — NON-APPOINTMENT (OUTPATIENT)
Age: 79
End: 2024-05-21

## 2024-05-27 ENCOUNTER — INPATIENT (INPATIENT)
Facility: HOSPITAL | Age: 79
LOS: 7 days | Discharge: NURSING, REHAB OR LONG-TERM CARE | End: 2024-06-04
Attending: HOSPITALIST | Admitting: HOSPITALIST
Payer: MEDICARE

## 2024-05-27 VITALS
SYSTOLIC BLOOD PRESSURE: 128 MMHG | HEART RATE: 104 BPM | OXYGEN SATURATION: 98 % | HEIGHT: 61 IN | TEMPERATURE: 101 F | DIASTOLIC BLOOD PRESSURE: 70 MMHG | RESPIRATION RATE: 18 BRPM | WEIGHT: 125.66 LBS

## 2024-05-27 DIAGNOSIS — R06.00 DYSPNEA, UNSPECIFIED: ICD-10-CM

## 2024-05-27 DIAGNOSIS — Z98.890 OTHER SPECIFIED POSTPROCEDURAL STATES: Chronic | ICD-10-CM

## 2024-05-27 DIAGNOSIS — N32.3 DIVERTICULUM OF BLADDER: Chronic | ICD-10-CM

## 2024-05-27 DIAGNOSIS — Z90.710 ACQUIRED ABSENCE OF BOTH CERVIX AND UTERUS: Chronic | ICD-10-CM

## 2024-05-27 LAB
ALBUMIN SERPL ELPH-MCNC: 3.3 G/DL — SIGNIFICANT CHANGE UP (ref 3.3–5)
ALP SERPL-CCNC: 61 U/L — SIGNIFICANT CHANGE UP (ref 40–120)
ALT FLD-CCNC: 55 U/L — HIGH (ref 4–33)
ANION GAP SERPL CALC-SCNC: 16 MMOL/L — HIGH (ref 7–14)
APTT BLD: 25.4 SEC — SIGNIFICANT CHANGE UP (ref 24.5–35.6)
AST SERPL-CCNC: 94 U/L — HIGH (ref 4–32)
BASOPHILS # BLD AUTO: 0.04 K/UL — SIGNIFICANT CHANGE UP (ref 0–0.2)
BASOPHILS NFR BLD AUTO: 0.4 % — SIGNIFICANT CHANGE UP (ref 0–2)
BILIRUB SERPL-MCNC: 0.9 MG/DL — SIGNIFICANT CHANGE UP (ref 0.2–1.2)
BLOOD GAS VENOUS COMPREHENSIVE RESULT: SIGNIFICANT CHANGE UP
BUN SERPL-MCNC: 43 MG/DL — HIGH (ref 7–23)
CALCIUM SERPL-MCNC: 9.1 MG/DL — SIGNIFICANT CHANGE UP (ref 8.4–10.5)
CHLORIDE SERPL-SCNC: 99 MMOL/L — SIGNIFICANT CHANGE UP (ref 98–107)
CO2 SERPL-SCNC: 18 MMOL/L — LOW (ref 22–31)
CREAT SERPL-MCNC: 2.02 MG/DL — HIGH (ref 0.5–1.3)
D DIMER BLD IA.RAPID-MCNC: 5084 NG/ML DDU — HIGH
EGFR: 24 ML/MIN/1.73M2 — LOW
EOSINOPHIL # BLD AUTO: 0.02 K/UL — SIGNIFICANT CHANGE UP (ref 0–0.5)
EOSINOPHIL NFR BLD AUTO: 0.2 % — SIGNIFICANT CHANGE UP (ref 0–6)
GLUCOSE SERPL-MCNC: 153 MG/DL — HIGH (ref 70–99)
HCT VFR BLD CALC: 26 % — LOW (ref 34.5–45)
HGB BLD-MCNC: 8.6 G/DL — LOW (ref 11.5–15.5)
IANC: 6.64 K/UL — SIGNIFICANT CHANGE UP (ref 1.8–7.4)
IMM GRANULOCYTES NFR BLD AUTO: 1.9 % — HIGH (ref 0–0.9)
INR BLD: 1.18 RATIO — SIGNIFICANT CHANGE UP (ref 0.85–1.18)
LYMPHOCYTES # BLD AUTO: 1.3 K/UL — SIGNIFICANT CHANGE UP (ref 1–3.3)
LYMPHOCYTES # BLD AUTO: 13.6 % — SIGNIFICANT CHANGE UP (ref 13–44)
MCHC RBC-ENTMCNC: 30.7 PG — SIGNIFICANT CHANGE UP (ref 27–34)
MCHC RBC-ENTMCNC: 33.1 GM/DL — SIGNIFICANT CHANGE UP (ref 32–36)
MCV RBC AUTO: 92.9 FL — SIGNIFICANT CHANGE UP (ref 80–100)
MONOCYTES # BLD AUTO: 1.4 K/UL — HIGH (ref 0–0.9)
MONOCYTES NFR BLD AUTO: 14.6 % — HIGH (ref 2–14)
NEUTROPHILS # BLD AUTO: 6.64 K/UL — SIGNIFICANT CHANGE UP (ref 1.8–7.4)
NEUTROPHILS NFR BLD AUTO: 69.3 % — SIGNIFICANT CHANGE UP (ref 43–77)
NRBC # BLD: 0 /100 WBCS — SIGNIFICANT CHANGE UP (ref 0–0)
NRBC # FLD: 0 K/UL — SIGNIFICANT CHANGE UP (ref 0–0)
NT-PROBNP SERPL-SCNC: 618 PG/ML — HIGH
PLATELET # BLD AUTO: 247 K/UL — SIGNIFICANT CHANGE UP (ref 150–400)
POTASSIUM SERPL-MCNC: 4.2 MMOL/L — SIGNIFICANT CHANGE UP (ref 3.5–5.3)
POTASSIUM SERPL-SCNC: 4.2 MMOL/L — SIGNIFICANT CHANGE UP (ref 3.5–5.3)
PROT SERPL-MCNC: 8.2 G/DL — SIGNIFICANT CHANGE UP (ref 6–8.3)
PROTHROM AB SERPL-ACNC: 13.1 SEC — HIGH (ref 9.5–13)
RBC # BLD: 2.8 M/UL — LOW (ref 3.8–5.2)
RBC # FLD: 13.3 % — SIGNIFICANT CHANGE UP (ref 10.3–14.5)
SODIUM SERPL-SCNC: 133 MMOL/L — LOW (ref 135–145)
T4 FREE SERPL-MCNC: 1.1 NG/DL — SIGNIFICANT CHANGE UP (ref 0.9–1.8)
TROPONIN T, HIGH SENSITIVITY RESULT: 32 NG/L — SIGNIFICANT CHANGE UP
TSH SERPL-MCNC: 0.64 UIU/ML — SIGNIFICANT CHANGE UP (ref 0.27–4.2)
WBC # BLD: 9.58 K/UL — SIGNIFICANT CHANGE UP (ref 3.8–10.5)
WBC # FLD AUTO: 9.58 K/UL — SIGNIFICANT CHANGE UP (ref 3.8–10.5)

## 2024-05-27 PROCEDURE — 99285 EMERGENCY DEPT VISIT HI MDM: CPT | Mod: FS

## 2024-05-27 PROCEDURE — 71046 X-RAY EXAM CHEST 2 VIEWS: CPT | Mod: 26

## 2024-05-27 RX ORDER — HEPARIN SODIUM 5000 [USP'U]/ML
4500 INJECTION INTRAVENOUS; SUBCUTANEOUS EVERY 6 HOURS
Refills: 0 | Status: DISCONTINUED | OUTPATIENT
Start: 2024-05-27 | End: 2024-05-28

## 2024-05-27 RX ORDER — HEPARIN SODIUM 5000 [USP'U]/ML
INJECTION INTRAVENOUS; SUBCUTANEOUS
Qty: 25000 | Refills: 0 | Status: DISCONTINUED | OUTPATIENT
Start: 2024-05-27 | End: 2024-05-28

## 2024-05-27 RX ORDER — HEPARIN SODIUM 5000 [USP'U]/ML
4500 INJECTION INTRAVENOUS; SUBCUTANEOUS ONCE
Refills: 0 | Status: COMPLETED | OUTPATIENT
Start: 2024-05-27 | End: 2024-05-27

## 2024-05-27 RX ORDER — HEPARIN SODIUM 5000 [USP'U]/ML
2000 INJECTION INTRAVENOUS; SUBCUTANEOUS EVERY 6 HOURS
Refills: 0 | Status: DISCONTINUED | OUTPATIENT
Start: 2024-05-27 | End: 2024-05-28

## 2024-05-27 RX ADMIN — HEPARIN SODIUM 4500 UNIT(S): 5000 INJECTION INTRAVENOUS; SUBCUTANEOUS at 23:38

## 2024-05-27 RX ADMIN — HEPARIN SODIUM 1100 UNIT(S)/HR: 5000 INJECTION INTRAVENOUS; SUBCUTANEOUS at 23:38

## 2024-05-27 NOTE — ED PROVIDER NOTE - ATTENDING CONTRIBUTION TO CARE
Brief HPI:  79-year-old female past medical history of CKD, edema of right lower leg, hypertension, hyperlipidemia, diabetes, goiter, pelvic malignancy of unclear etiology (patient without treatment currently) presents for chest pain, shortness of breath starting this morning.  Patient states chest pain is substernal, nonradiating, nonexertional, nonpleuritic, no aggravating or alleviating factors.  Shortness of breath is nonexertional and nonpositional.  Patient reports chronic right lower extremity swelling with negative duplex testing in the past.  No history of DVT or PE.  Denies numbness, tingling, weakness.  Patient states she is having difficulty ambulating due to pain in the right lower extremity.    Vitals:   Reviewed    Exam:    GEN:  Non-toxic appearing, non-distressed, speaking full sentences, non-diaphoretic, AAOx3  HEENT:  NCAT, neck supple, EOMI, PERRLA, sclera anicteric, no conjunctival pallor or injection, no stridor, normal voice, no tonsillar exudate, uvula midline  CV:  regular rhythm and rate, s1/s2 audible, no murmurs, rubs or gallops, peripheral pulses 2+ and symmetric  PULM:  non-labored respirations, lungs clear to auscultation bilaterally, no wheezes, crackles or rales  ABD:  non distended, non-tender, no rebound, no guarding, negative Eason's sign, bowel sounds normal, no cvat  MSK:  no gross deformity, non-tender extremities and joints, range of motion grossly normal appearing, Right lower extremity edema, extremities warm and well perfused   NEURO:  AAOx3, CN II-XII intact, motor 5/5 in upper and lower extremities bilaterally, sensation grossly intact in extremities and trunk, no gait deficit  SKIN:  warm, dry, no rash or vesicles     A/P:  79-year-old female past medical history of CKD, edema of right lower leg, hypertension, hyperlipidemia, diabetes, goiter, pelvic malignancy of unclear etiology (patient without treatment currently) presents for chest pain, shortness of breath starting this morning. Vital signs stable, afebrile.  EKG nonischemic.  Right lower extremity swelling, well-perfused.  Per patient, the right lower extremity edema is longstanding.  Low concern for acute coronary syndrome as chest pain nonexertional, not rating, no nausea or diaphoresis.  Low concern for PE overall but still, however due to active malignancy leg swelling will obtain CTA imaging.  Plan for labs, chest x-ray, CTA, duplex.  Disposition pending.

## 2024-05-27 NOTE — ED PROVIDER NOTE - PROGRESS NOTE DETAILS
ZOË Francisco: Due to pt's GFR pt is unable to have CT contrast, high clinical suspicion for PE, will cover with heparin in the interm and admit to telemetry and obtain a VQ scan. Pt with a low grade oral temp at triage, no acute infectious signs or sx, higher suspicion for low grade temp being from a PE than infectious process. D/W accepting physician Dr. Lacy, will obtain blood cultures, urine studies and monitor off antibiotics in the interm.

## 2024-05-27 NOTE — ED ADULT NURSE NOTE - OBJECTIVE STATEMENT
Roman RN- presents C/O 1 episode of non radiating midsternal this morning. Pt also C/O right lower extremity swelling starting last year worsening the past 2 weeks causing inability to ambulate. NSR on cardiac monitor. No complaints of  headache, nausea, dizziness, vomiting  SOB, fever, chills verbalized. Respirations even and unlabored with equal chest rise bilaterally. ABD is soft, non tender, non distended. 20G IV placed to left AC labs sent. will continue to monitor

## 2024-05-27 NOTE — ED ADULT NURSE NOTE - NSFALLUNIVINTERV_ED_ALL_ED
Bed/Stretcher in lowest position, wheels locked, appropriate side rails in place/Call bell, personal items and telephone in reach/Instruct patient to call for assistance before getting out of bed/chair/stretcher/Non-slip footwear applied when patient is off stretcher/[place] to call system/Physically safe environment - no spills, clutter or unnecessary equipment/Purposeful proactive rounding/Room/bathroom lighting operational, light cord in reach

## 2024-05-27 NOTE — ED PROVIDER NOTE - OBJECTIVE STATEMENT
80 Y/O F CKD, Edema of right lower leg , HTN, HLD, DM2, Goiter states that she had chest tightness this morning with associated shortness of breath. Pt is unsure of the duration of her sx. Pt's daughter also states that she has chronic RLL edema x years worse over the past 2 weeks. Pt normally walks with a walker but as per daughter pt states that she has not been walking in the past 2 weeks and had urinary incontinence x weeks. Pt denies back pain and denies trauma. Pt states that she had "pelvic cancer" unsure if ovarian or uterine but denies undergoing treatment. Pt denies any other sx or acute complaints.

## 2024-05-27 NOTE — ED PROVIDER NOTE - CARE PLAN
Principal Discharge DX:	Dyspnea  Secondary Diagnosis:	Chest pain  Secondary Diagnosis:	Pain in right leg   1

## 2024-05-27 NOTE — ED PROVIDER NOTE - CLINICAL SUMMARY MEDICAL DECISION MAKING FREE TEXT BOX
78 Y/O F CKD, Edema of right lower leg , HTN, HLD, DM2, Goiter states that she had chest tightness this morning with associated shortness of breath. Pt is unsure of the duration of her sx. Pt's daughter also states that she has chronic RLL edema x years worse over the past 2 weeks. Pt normally walks with a walker but as per daughter pt states that she has not been walking in the past 2 weeks and had urinary incontinence x weeks. Plan is CTA to eval for PE, labs to eval for anemia or electrolyte disturbance, U/S to eval for a DVT, CT abdomen to eval for IVC or other great vessel compression from her pelvic CA or thrombus.

## 2024-05-27 NOTE — ED ADULT TRIAGE NOTE - CHIEF COMPLAINT QUOTE
pt living with DM type2, c/o of mild chest discomfort with sob since this am, pt c/o of pain and swelling to RT LE as well,

## 2024-05-27 NOTE — ED PROVIDER NOTE - NEUROLOGICAL, MLM
Alert and oriented, 5/5 strength and intact sensation bilaterally in upper and lower extremity. Normal plantar flexion. RLE straight leg raise is limited by pain.

## 2024-05-27 NOTE — ED ADULT NURSE NOTE - NS ED NURSE LEVEL OF CONSCIOUSNESS ORIENTATION
"Chief Complaint   Patient presents with     RECHECK     lupus      /68  Pulse 78  Ht 1.676 m (5' 6\")  Wt 66.8 kg (147 lb 3.2 oz)  LMP 02/12/2018  SpO2 96%  BMI 23.76 kg/m2  Valdez Francois, ABBI    " Oriented - self; Oriented - place; Oriented - time

## 2024-05-28 DIAGNOSIS — R19.00 INTRA-ABDOMINAL AND PELVIC SWELLING, MASS AND LUMP, UNSPECIFIED SITE: ICD-10-CM

## 2024-05-28 DIAGNOSIS — N17.9 ACUTE KIDNEY FAILURE, UNSPECIFIED: ICD-10-CM

## 2024-05-28 DIAGNOSIS — Z51.5 ENCOUNTER FOR PALLIATIVE CARE: ICD-10-CM

## 2024-05-28 DIAGNOSIS — Z71.89 OTHER SPECIFIED COUNSELING: ICD-10-CM

## 2024-05-28 DIAGNOSIS — I10 ESSENTIAL (PRIMARY) HYPERTENSION: ICD-10-CM

## 2024-05-28 DIAGNOSIS — R09.89 OTHER SPECIFIED SYMPTOMS AND SIGNS INVOLVING THE CIRCULATORY AND RESPIRATORY SYSTEMS: ICD-10-CM

## 2024-05-28 DIAGNOSIS — D64.9 ANEMIA, UNSPECIFIED: ICD-10-CM

## 2024-05-28 DIAGNOSIS — E11.65 TYPE 2 DIABETES MELLITUS WITH HYPERGLYCEMIA: ICD-10-CM

## 2024-05-28 DIAGNOSIS — Z86.39 PERSONAL HISTORY OF OTHER ENDOCRINE, NUTRITIONAL AND METABOLIC DISEASE: ICD-10-CM

## 2024-05-28 DIAGNOSIS — R50.9 FEVER, UNSPECIFIED: ICD-10-CM

## 2024-05-28 DIAGNOSIS — G89.3 NEOPLASM RELATED PAIN (ACUTE) (CHRONIC): ICD-10-CM

## 2024-05-28 DIAGNOSIS — R60.0 LOCALIZED EDEMA: ICD-10-CM

## 2024-05-28 DIAGNOSIS — R07.9 CHEST PAIN, UNSPECIFIED: ICD-10-CM

## 2024-05-28 DIAGNOSIS — Z29.9 ENCOUNTER FOR PROPHYLACTIC MEASURES, UNSPECIFIED: ICD-10-CM

## 2024-05-28 LAB
A1C WITH ESTIMATED AVERAGE GLUCOSE RESULT: 4.9 % — SIGNIFICANT CHANGE UP (ref 4–5.6)
ADD ON TEST-SPECIMEN IN LAB: SIGNIFICANT CHANGE UP
ADD ON TEST-SPECIMEN IN LAB: SIGNIFICANT CHANGE UP
ALT FLD-CCNC: 53 U/L — HIGH (ref 4–33)
ANION GAP SERPL CALC-SCNC: 14 MMOL/L — SIGNIFICANT CHANGE UP (ref 7–14)
APPEARANCE UR: ABNORMAL
APTT BLD: 36.7 SEC — HIGH (ref 24.5–35.6)
APTT BLD: 45 SEC — HIGH (ref 24.5–35.6)
AST SERPL-CCNC: 71 U/L — HIGH (ref 4–32)
BACTERIA # UR AUTO: ABNORMAL /HPF
BILIRUB UR-MCNC: NEGATIVE — SIGNIFICANT CHANGE UP
BUN SERPL-MCNC: 42 MG/DL — HIGH (ref 7–23)
CALCIUM SERPL-MCNC: 9.6 MG/DL — SIGNIFICANT CHANGE UP (ref 8.4–10.5)
CAST: 4 /LPF — SIGNIFICANT CHANGE UP (ref 0–4)
CHLORIDE SERPL-SCNC: 101 MMOL/L — SIGNIFICANT CHANGE UP (ref 98–107)
CK MB BLD-MCNC: 1.5 % — SIGNIFICANT CHANGE UP (ref 0–2.5)
CK MB CFR SERPL CALC: 1 NG/ML — SIGNIFICANT CHANGE UP
CK SERPL-CCNC: 67 U/L — SIGNIFICANT CHANGE UP (ref 25–170)
CO2 SERPL-SCNC: 20 MMOL/L — LOW (ref 22–31)
COLOR SPEC: YELLOW — SIGNIFICANT CHANGE UP
CREAT ?TM UR-MCNC: 135 MG/DL — SIGNIFICANT CHANGE UP
CREAT SERPL-MCNC: 1.81 MG/DL — HIGH (ref 0.5–1.3)
DIFF PNL FLD: ABNORMAL
EGFR: 28 ML/MIN/1.73M2 — LOW
ESTIMATED AVERAGE GLUCOSE: 94 — SIGNIFICANT CHANGE UP
GLUCOSE BLDC GLUCOMTR-MCNC: 112 MG/DL — HIGH (ref 70–99)
GLUCOSE BLDC GLUCOMTR-MCNC: 131 MG/DL — HIGH (ref 70–99)
GLUCOSE BLDC GLUCOMTR-MCNC: 146 MG/DL — HIGH (ref 70–99)
GLUCOSE BLDC GLUCOMTR-MCNC: 212 MG/DL — HIGH (ref 70–99)
GLUCOSE SERPL-MCNC: 119 MG/DL — HIGH (ref 70–99)
GLUCOSE UR QL: NEGATIVE MG/DL — SIGNIFICANT CHANGE UP
HCT VFR BLD CALC: 27.7 % — LOW (ref 34.5–45)
HGB BLD-MCNC: 8.9 G/DL — LOW (ref 11.5–15.5)
IRON SATN MFR SERPL: 15 UG/DL — LOW (ref 30–160)
IRON SATN MFR SERPL: 9 % — LOW (ref 14–50)
KETONES UR-MCNC: NEGATIVE MG/DL — SIGNIFICANT CHANGE UP
LEUKOCYTE ESTERASE UR-ACNC: ABNORMAL
MAGNESIUM SERPL-MCNC: 2.2 MG/DL — SIGNIFICANT CHANGE UP (ref 1.6–2.6)
MCHC RBC-ENTMCNC: 29.9 PG — SIGNIFICANT CHANGE UP (ref 27–34)
MCHC RBC-ENTMCNC: 32.1 GM/DL — SIGNIFICANT CHANGE UP (ref 32–36)
MCV RBC AUTO: 93 FL — SIGNIFICANT CHANGE UP (ref 80–100)
NITRITE UR-MCNC: NEGATIVE — SIGNIFICANT CHANGE UP
NRBC # BLD: 0 /100 WBCS — SIGNIFICANT CHANGE UP (ref 0–0)
NRBC # FLD: 0 K/UL — SIGNIFICANT CHANGE UP (ref 0–0)
OSMOLALITY UR: 673 MOSM/KG — SIGNIFICANT CHANGE UP (ref 50–1200)
PH UR: 5.5 — SIGNIFICANT CHANGE UP (ref 5–8)
PHOSPHATE SERPL-MCNC: 3.3 MG/DL — SIGNIFICANT CHANGE UP (ref 2.5–4.5)
PLATELET # BLD AUTO: 281 K/UL — SIGNIFICANT CHANGE UP (ref 150–400)
POTASSIUM SERPL-MCNC: 4.2 MMOL/L — SIGNIFICANT CHANGE UP (ref 3.5–5.3)
POTASSIUM SERPL-SCNC: 4.2 MMOL/L — SIGNIFICANT CHANGE UP (ref 3.5–5.3)
POTASSIUM UR-SCNC: 48.4 MMOL/L — SIGNIFICANT CHANGE UP
PROT ?TM UR-MCNC: 114 MG/DL — SIGNIFICANT CHANGE UP
PROT UR-MCNC: 100 MG/DL
PROT/CREAT UR-RTO: 0.8 RATIO — HIGH (ref 0–0.2)
RBC # BLD: 2.98 M/UL — LOW (ref 3.8–5.2)
RBC # FLD: 13.2 % — SIGNIFICANT CHANGE UP (ref 10.3–14.5)
RBC CASTS # UR COMP ASSIST: 2 /HPF — SIGNIFICANT CHANGE UP (ref 0–4)
SODIUM SERPL-SCNC: 135 MMOL/L — SIGNIFICANT CHANGE UP (ref 135–145)
SODIUM UR-SCNC: 55 MMOL/L — SIGNIFICANT CHANGE UP
SP GR SPEC: 1.02 — SIGNIFICANT CHANGE UP (ref 1–1.03)
SQUAMOUS # UR AUTO: 5 /HPF — SIGNIFICANT CHANGE UP (ref 0–5)
TIBC SERPL-MCNC: 172 UG/DL — LOW (ref 220–430)
TROPONIN T, HIGH SENSITIVITY RESULT: 27 NG/L — SIGNIFICANT CHANGE UP
UIBC SERPL-MCNC: 157 UG/DL — SIGNIFICANT CHANGE UP (ref 110–370)
UROBILINOGEN FLD QL: 1 MG/DL — SIGNIFICANT CHANGE UP (ref 0.2–1)
UUN UR-MCNC: 1184.6 MG/DL — SIGNIFICANT CHANGE UP
WBC # BLD: 9.3 K/UL — SIGNIFICANT CHANGE UP (ref 3.8–10.5)
WBC # FLD AUTO: 9.3 K/UL — SIGNIFICANT CHANGE UP (ref 3.8–10.5)
WBC UR QL: 31 /HPF — HIGH (ref 0–5)

## 2024-05-28 PROCEDURE — 73562 X-RAY EXAM OF KNEE 3: CPT | Mod: 26,RT

## 2024-05-28 PROCEDURE — 99497 ADVNCD CARE PLAN 30 MIN: CPT | Mod: 25

## 2024-05-28 PROCEDURE — 78582 LUNG VENTILAT&PERFUS IMAGING: CPT | Mod: 26,GC

## 2024-05-28 PROCEDURE — 99223 1ST HOSP IP/OBS HIGH 75: CPT

## 2024-05-28 PROCEDURE — 93970 EXTREMITY STUDY: CPT | Mod: 26

## 2024-05-28 PROCEDURE — 76770 US EXAM ABDO BACK WALL COMP: CPT | Mod: 26

## 2024-05-28 RX ORDER — OXYCODONE AND ACETAMINOPHEN 5; 325 MG/1; MG/1
1 TABLET ORAL EVERY 12 HOURS
Refills: 0 | Status: DISCONTINUED | OUTPATIENT
Start: 2024-05-28 | End: 2024-05-28

## 2024-05-28 RX ORDER — LANOLIN ALCOHOL/MO/W.PET/CERES
3 CREAM (GRAM) TOPICAL AT BEDTIME
Refills: 0 | Status: DISCONTINUED | OUTPATIENT
Start: 2024-05-28 | End: 2024-05-28

## 2024-05-28 RX ORDER — HEPARIN SODIUM 5000 [USP'U]/ML
1400 INJECTION INTRAVENOUS; SUBCUTANEOUS
Qty: 25000 | Refills: 0 | Status: DISCONTINUED | OUTPATIENT
Start: 2024-05-28 | End: 2024-05-28

## 2024-05-28 RX ORDER — HEPARIN SODIUM 5000 [USP'U]/ML
5000 INJECTION INTRAVENOUS; SUBCUTANEOUS EVERY 8 HOURS
Refills: 0 | Status: DISCONTINUED | OUTPATIENT
Start: 2024-05-28 | End: 2024-05-30

## 2024-05-28 RX ORDER — ACETAMINOPHEN 500 MG
650 TABLET ORAL EVERY 6 HOURS
Refills: 0 | Status: DISCONTINUED | OUTPATIENT
Start: 2024-05-28 | End: 2024-06-04

## 2024-05-28 RX ORDER — LANOLIN ALCOHOL/MO/W.PET/CERES
3 CREAM (GRAM) TOPICAL AT BEDTIME
Refills: 0 | Status: DISCONTINUED | OUTPATIENT
Start: 2024-05-28 | End: 2024-06-04

## 2024-05-28 RX ORDER — CEFTRIAXONE 500 MG/1
1000 INJECTION, POWDER, FOR SOLUTION INTRAMUSCULAR; INTRAVENOUS EVERY 24 HOURS
Refills: 0 | Status: DISCONTINUED | OUTPATIENT
Start: 2024-05-28 | End: 2024-05-30

## 2024-05-28 RX ADMIN — Medication 3 MILLIGRAM(S): at 21:15

## 2024-05-28 RX ADMIN — HEPARIN SODIUM 4500 UNIT(S): 5000 INJECTION INTRAVENOUS; SUBCUTANEOUS at 06:50

## 2024-05-28 RX ADMIN — HEPARIN SODIUM 1400 UNIT(S)/HR: 5000 INJECTION INTRAVENOUS; SUBCUTANEOUS at 13:36

## 2024-05-28 RX ADMIN — HEPARIN SODIUM 1300 UNIT(S)/HR: 5000 INJECTION INTRAVENOUS; SUBCUTANEOUS at 06:49

## 2024-05-28 RX ADMIN — CEFTRIAXONE 100 MILLIGRAM(S): 500 INJECTION, POWDER, FOR SOLUTION INTRAMUSCULAR; INTRAVENOUS at 17:32

## 2024-05-28 RX ADMIN — HEPARIN SODIUM 1400 UNIT(S)/HR: 5000 INJECTION INTRAVENOUS; SUBCUTANEOUS at 16:33

## 2024-05-28 RX ADMIN — HEPARIN SODIUM 5000 UNIT(S): 5000 INJECTION INTRAVENOUS; SUBCUTANEOUS at 21:15

## 2024-05-28 NOTE — H&P ADULT - NSHPREVIEWOFSYSTEMS_GEN_ALL_CORE
ROS:    Constitutional: [ ] fevers [ ] chills   HEENT: [ ] postnasal drip [ ] nasal congestion  CV: [x ] chest pain [ ] orthopnea [ ] palpitations   Resp: [ ] cough [ ] shortness of breath [ ] dyspnea    GI: [ ] nausea [ ] vomiting [ ] diarrhea [ ] constipation [ ] abd pain   : [ ] dysuria [ ] nocturia [ ] hematuria [ ] increased urinary frequency  Musculoskeletal: [ ] back pain [ ] myalgias [ ] arthralgias [x] chronic RLE pain  Skin: [ ] rash [ ] itch  Neurological: [ ] headache [ ] dizziness [ ] syncope [ ] weakness [ ] numbness  Psychiatric: [ ] anxiety [ ] depression  Endocrine: [ ] diabetes [ ] thyroid problem  Hematologic/Lymphatic: [ ] anemia [ ] bleeding problem  [x ] All other systems negative

## 2024-05-28 NOTE — CONSULT NOTE ADULT - SUBJECTIVE AND OBJECTIVE BOX
Date of [id number] @ 13:23  HPI:  79F with PMHx CKD (2/2 R hydronephrosis from obstructing pelvic mass), Chronic RLE edema since 9/2023, HTN, HLD, goiter, pelvic mass with mets initially dx 7/2023 with LN bx 11/2023 with SCC vs urothelial carcinoma (no treatment, pending patient/family decision on RT) presenting with chest pain 5/27 AM. The patient was in USOH until 6AM when she suddenly felt substernal needle-like chest pain. This lasted only a few minutes and went away on its own. There was no radiation or exertional component. She denies SOB, LH, dizziness. Of note yesterday she felt some heart fluttering. Per daughter she felt as though she may have a temperature but this was not confirmed at [facility] this week. Patient denies fevers, chills, cough, abdominal pain, vomiting, diarrhea, dysuria, hematuria. Per daughter she has dark concentrated urine and some odor. Over the last week her RLE pain has gotten worse limiting her ambulation. She denies any weakness, numbness, tingling, saddle anesthesia, back pain, trauma. She cannot make it to the bathroom in time due to being in the bed and urinates on herself. Now she uses a diaper. Otherwise denies loss of sensation when urinating. Denies fecal incontinence. Of note patient is following with gyn onc and rad onc regarding her pelvic mass. She is deciding on if she wants RT per notes.       Onc hx as per gyn onc note 2/21/2024 Dr. Gary Goldberg  - Pelvic mass was initially discovered in July when patient presented to the ED s/p fall.  At this time, patient was noted to have an IRENE and unable to void.  - Pt referred to Urology - their workup was concerning for 12cm diverticulum and non-functioning left kidney.  During this time period patient noted swelling of her RLE.  For this swelling patient was referred to the ED where she was found to have 17cm pelvic mass with pelvic and para-aortic lymph nodes.  - s/p Cysto, R ureteroscopy, retrograde pyelogram , vaginoscopy (1/5/24): No evidence of bladder diverticulum, and no connection to mass visualized in collecting system. Patient discussed at  tumor board, and believed to be gynecologic in origin.  - s/p IR biopsy pelvic lymph node: SCC vs urothelial carcinoma  -Pelvic MR(1/30/24): S/p hysterectomy per history. Complex solid and cystic structure 19cm w/ rind of soft tissue measuring up to 1.7cm in thickness w/ enhancement and diffusion restriction abutting/contiguous with vaginal cuff. Previously measuring 17cm. Multiple enlarged centrally necrotic pelvic LAD. Stable severe R hydronephrosis 2/2 mass effect.  ?  - Pt discussed at GYN ONC Tumor Board on 1/17/24. 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 Plan for pelvic RT, possible cis/RT pending kidney function/tolerability.    Per rad onc note Dr. Estrella Ling 2/22/24:  Plan for pelvic RT, possible cis/RT pending kidney function/tolerability. Disease felt not to be curative per rad onc note.    I discussed with patient and her daughter and they are still deciding on whether or not they want radiation.   ?    In ED, patient started on heparin gtt due to elevated D-dimer and cannot get CTA chest with contrast because of poor kidney function. Patient denies any hematochezia, melena, hematemesis or any other sources of bleeding. (28 May 2024 02:12)    Palliative consulted for complex medical decision making and symptom management in the setting of serious illness.        PERTINENT PM/SXH:   Essential hypertension    Hyperlipidemia    Thyroid goiter    Diabetes    T2DM (type 2 diabetes mellitus)    CKD (chronic kidney disease)    Edema of right lower leg    Pelvic mass      H/O: hysterectomy    H/O lumpectomy    Bladder diverticulum    S/P cystoscopy      FAMILY HISTORY:  No pertinent family history in first degree relatives      Family Hx substance abuse [ ]yes [ ]no    ITEMS NOT CHECKED ARE NOT PRESENT    SOCIAL HISTORY:   Significant other/partner[ ]  Children[ ]  Restorationism/Spirituality:  Substance hx:  [ ]   Tobacco hx:  [ ]   Alcohol hx: [ ]   Home Opioid hx:  [ x] I-Stop Reference No: [id number]  Living Situation: [ ]Home  [ ]Long term care  [ ]Rehab [ ]Other    ADVANCE DIRECTIVES:    DNR/MOLST  [ ]  Living Will  [ ]   DECISION MAKER(s):  [ ] Health Care Proxy(s)  [ ] Surrogate(s)  [ ] Guardian           Name(s): Phone Number(s):    BASELINE (I)ADL(s) (prior to admission):  [place]: [ ]Total  [ ] Moderate [ ]Dependent    This report was requested by: Juanita Zuniga | Reference #: [id number]    Practitioner Count: 2  Pharmacy Count: 1  Current Opioid Prescriptions: 0  Current Benzodiazepine Prescriptions: 0  Current Stimulant Prescriptions: 0      Patient Demographic Information (PDI)       PDI	First Name	Last Name	Birth Date	Gender	Street Address	[facility]	Zip Code  ANEUDY Sarah	1945	Female	[address] [facility]	[address]    Prescription Information      PDI Filter:    PDI	My Rx	Current Rx	Drug Type	Rx Written	Rx Dispensed	Drug	Quantity	Days Supply	Prescriber Name	Prescriber ESTRELLITA #	Payment Method	Dispenser  A	N	N	O	05/13/2024	05/19/2024	acetaminophen-cod #2 tablet	12	4	Otoniel Liu MD	[id number]	Medicare	[id number]  A	N	N	O	05/10/2024	05/10/2024	hydrocodone-acetaminophen 5-300 mg tablet	20	10	David Pavon MD	[id number]	Insurance	[id number]    * - Details of Drug Type : O = Opioid, B = Benzodiazepine, S = Stimulant    * - Drugs marked with an asterisk are compound drugs. If the compound drug is made up of more than one controlled substance, then each controlled substance will be a separate row in the table.  Allergies    No Known Allergies    Intolerances    MEDICATIONS  (STANDING):  heparin  Infusion.  Unit(s)/Hr (11 mL/Hr) IV Continuous <Continuous>  melatonin 3 milliGRAM(s) Oral at bedtime    MEDICATIONS  (PRN):  acetaminophen     Tablet .. 650 milliGRAM(s) Oral every 6 hours PRN Temp greater or equal to 38C (100.4F), Mild Pain (1 - 3)    PRESENT SYMPTOMS: [ ]Unable to self-report  [ ] CPOT [ ] PAINADs [ ] RDOS  Source if other than patient:  [ ]Family   [ ]Team     Pain: [ ]yes [ ]no  QOL impact -   Location -                    Aggravating factors -  Quality -  Radiation -  Timing-  Severity (0-10 scale):  Minimal acceptable level/pain goal (0-10 scale):     CPOT:    https://www.sccm.org/getattachment/[email or web address]-1v0f-3i7h-6614e9076p5o/Critical-Care-Pain-Observation-Tool-(CPOT)    PAIN AD Score:   http://geriatrictoolkit.[facility]/cog/painad.pdf (press ctrl +  left click to view)    Dyspnea:                           [ ]Mild [ ]Moderate [ ]Severe    RDOS:  0 to 2  minimal or no respiratory distress   3  mild distress  4 to 6 moderate distress  >7 severe distress  https://homecareinformation.net/handouts/hen/Respiratory_Distress_Observation_Scale.pdf (Ctrl +  left click to view)     Anxiety:                             [ ]Mild [ ]Moderate [ ]Severe  Fatigue:                             [ ]Mild [ ]Moderate [ ]Severe  Nausea:                             [ ]Mild [ ]Moderate [ ]Severe  Loss of appetite:              [ ]Mild [ ]Moderate [ ]Severe  Constipation:                    [ ]Mild [ ]Moderate [ ]Severe    PCSSQ[Palliative Care Spiritual Screening Question]   Severity (0-10): deferred  Score of 4 or > indicate consideration of Chaplaincy referral.  Chaplaincy Referral: [ ] yes [ ] refused [ ] following [ ] Deferred     Caregiver [place]? : [ ] yes [ ] no [ ] Deferred [ ] Declined             Social work referral [ ] Patient & Family Centered Care Referral [ ]     Anticipatory Grief present?:  [ ] yes [ ] no  [ ] Deferred                  Social work referral [ ] Chaplaincy Referral[ ]    Other Symptoms:  [ x]All other review of systems negative   [ ] Unable to obtain ROS due to poor mental status     Palliative Performance Status Version 2:         %    http://npcrc.org/files/news/palliative_performance_scale_ppsv2.pdf    PHYSICAL EXAM:  Vital Signs Last 24 Hrs  T(C): 37.6 (28 May 2024 09:31), Max: 38.1 (27 May 2024 18:41)  T(F): 99.6 (28 May 2024 09:31), Max: 100.6 (27 May 2024 18:41)  HR: 90 (28 May 2024 09:31) (82 - 104)  BP: 122/64 (28 May 2024 09:31) (118/59 - 140/76)  BP(mean): --  RR: 18 (28 May 2024 09:31) (15 - 18)  SpO2: 100% (28 May 2024 09:31) (98% - 100%)    Parameters below as of 28 May 2024 09:31  Patient On (Oxygen Delivery Method): room air     I&O's Summary    GENERAL: [ ]Cachexia    [ x]Alert  [ ]Oriented x   [ ]Lethargic  [ ]Unarousable  [x ]Verbal  [ ]Non-Verbal  Behavioral:   [ ] Anxiety  [ ] Delirium [ ] Agitation [ ] Other  HEENT:  [ ]Normal   [x ]Dry mouth   [ ]ET Tube/Trach  [ ]Oral lesions  PULMONARY:   [ x]Clear [ ]Tachypnea  [ ]Audible excessive secretions   [ ]Rhonchi        [ ]Right [ ]Left [ ]Bilateral  [ ]Crackles        [ ]Right [ ]Left [ ]Bilateral  [ ]Wheezing     [ ]Right [ ]Left [ ]Bilateral  [ ]Diminished breath sounds [ ]right [ ]left [ ]bilateral  CARDIOVASCULAR:    [x ]Regular [ ]Irregular [ ]Tachy  [ ]Bc [ ]Murmur [ ]Other  GASTROINTESTINAL:  [x ]Soft  [ ]Distended   [ ]+BS  [x ]Non tender [ ]Tender  [ ]Other [ ]PEG [ ]OGT/ NGT  Last BM:  GENITOURINARY:  [ x]Normal [ ] Incontinent   [ ]Oliguria/Anuria   [ ]Haro  MUSCULOSKELETAL:   [ ]Normal   [ x]Weakness  [ ]Bed/Wheelchair bound [ ]Edema  NEUROLOGIC:   [ ]No focal deficits  [ x]Cognitive impairment  [ ]Dysphagia [ ]Dysarthria [ ]Paresis [ ]Other   SKIN:   [ ]Normal  [ ]Rash  [ ]Other  [ ]Pressure ulcer(s)       Present on admission [ ]y [ ]n      CRITICAL CARE:  [ ] Shock Present  [ ]Septic [ ]Cardiogenic [ ]Neurologic [ ]Hypovolemic  [ ]  Vasopressors [ ]  Inotropes   [ ]Respiratory failure present [ ]Mechanical ventilation [ ]Non-invasive ventilatory support [ ]High flow    [ ]Acute  [ ]Chronic [ ]Hypoxic  [ ]Hypercarbic [ ]Other  [ ]Other organ failure     LABS:                        8.9    9.30  )-----------( 281      ( 28 May 2024 05:37 )             27.7   05-28    135  |  101  |  42<H>  ----------------------------<  119<H>  4.2   |  20<L>  |  1.81<H>    Ca    9.6      28 May 2024 05:37  Phos  3.3     05-28  Mg     2.20     05-28    TPro  x   /  Alb  x   /  TBili  x   /  DBili  x   /  AST  71<H>  /  ALT  53<H>  /  AlkPhos  x   05-28  PT/INR - ( 27 May 2024 21:10 )   PT: 13.1 sec;   INR: 1.18 ratio         PTT - ( 28 May 2024 12:28 )  PTT:45.0 sec    Urinalysis Basic - ( 28 May 2024 05:37 )    Color: x / Appearance: x / SG: x / pH: x  Gluc: 119 mg/dL / Ketone: x  / Bili: x / Urobili: x   Blood: x / Protein: x / Nitrite: x   Leuk Esterase: x / RBC: x / WBC x   Sq Epi: x / Non Sq Epi: x / Bacteria: x      RADIOLOGY & ADDITIONAL STUDIES:  < from: US Kidney and Bladder (05.28.24 @ 08:52) >  IMPRESSION:  Moderate right hydronephrosis.    Urinary bladder is distended, even after voiding. Please correlate for   urinary retention.    < end of copied text >  < from: Xray Chest 2 Views PA/Lat (05.27.24 @ 21:00) >    FINDINGS:  The heart is borderline in size.  The lungs are clear.  No pleural effusion or pneumothorax.    IMPRESSION:  Clear lungs.    < end of copied text >  < from: US Duplex Venous Lower Ext Complete, Bilateral (05.28.24 @ 00:09) >    IMPRESSION:  No evidence of deep venous thrombosis in either lower extremity.    < end of copied text >    PROTEIN CALORIE MALNUTRITION PRESENT: [ ]mild [ ]moderate [ ]severe [ ]underweight [ ]morbid obesity  https://www.andeal.org/vault/2440/web/files/ONC/Table_Clinical%20Characteristics%20to%20Document%20Malnutrition-White%20JV%20et%20al%202012.pdf    Height (cm): 154.9 (05-27-24 @ 18:41), 154.9 (01-05-24 @ 11:51), 157 (12-15-23 @ 09:11)  Weight (kg): 57 (05-27-24 @ 18:41), 60.9 (01-05-24 @ 11:51), 61.2 (12-15-23 @ 09:11)  BMI (kg/m2): 23.8 (05-27-24 @ 18:41), 25.4 (01-05-24 @ 11:51), 24.8 (12-15-23 @ 09:11)    [ ]PPSV2 < or = to 30% [ ]significant weight loss  [ ]poor nutritional intake  [ ]anasarca[ ]Artificial Nutrition      Other REFERRALS:  [ ]Hospice  [ ]Child Life  [ ]Social Work  [ ]Case management [ ]Holistic Therapy     Goals of Care Document:  Date of [id number] @ 13:23  HPI:  79F with PMHx CKD (2/2 R hydronephrosis from obstructing pelvic mass), Chronic RLE edema since 2023, HTN, HLD, goiter, pelvic mass with mets initially dx 2023 with LN bx 2023 with SCC vs urothelial carcinoma (no treatment, pending patient/family decision on RT) presenting with chest pain 5/27 AM. The patient was in USOH until 6AM when she suddenly felt substernal needle-like chest pain. This lasted only a few minutes and went away on its own. There was no radiation or exertional component. She denies SOB, LH, dizziness. Of note yesterday she felt some heart fluttering. Per daughter she felt as though she may have a temperature but this was not confirmed at [facility] this week. Patient denies fevers, chills, cough, abdominal pain, vomiting, diarrhea, dysuria, hematuria. Per daughter she has dark concentrated urine and some odor. Over the last week her RLE pain has gotten worse limiting her ambulation. She denies any weakness, numbness, tingling, saddle anesthesia, back pain, trauma. She cannot make it to the bathroom in time due to being in the bed and urinates on herself. Now she uses a diaper. Otherwise denies loss of sensation when urinating. Denies fecal incontinence. Of note patient is following with gyn onc and rad onc regarding her pelvic mass. She is deciding on if she wants RT per notes.       Onc hx as per gyn onc note 2024 Dr. Gary Goldberg  - Pelvic mass was initially discovered in July when patient presented to the ED s/p fall.  At this time, patient was noted to have an IRENE and unable to void.  - Pt referred to Urology - their workup was concerning for 12cm diverticulum and non-functioning left kidney.  During this time period patient noted swelling of her RLE.  For this swelling patient was referred to the ED where she was found to have 17cm pelvic mass with pelvic and para-aortic lymph nodes.  - s/p Cysto, R ureteroscopy, retrograde pyelogram , vaginoscopy (24): No evidence of bladder diverticulum, and no connection to mass visualized in collecting system. Patient discussed at  tumor board, and believed to be gynecologic in origin.  - s/p IR biopsy pelvic lymph node: SCC vs urothelial carcinoma  -Pelvic MR(24): S/p hysterectomy per history. Complex solid and cystic structure 19cm w/ rind of soft tissue measuring up to 1.7cm in thickness w/ enhancement and diffusion restriction abutting/contiguous with vaginal cuff. Previously measuring 17cm. Multiple enlarged centrally necrotic pelvic LAD. Stable severe R hydronephrosis 2/2 mass effect.  ?  - Pt discussed at GYN ONC Tumor Board on 24. 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 Plan for pelvic RT, possible cis/RT pending kidney function/tolerability.    Per rad onc note Dr. Estrella Ling 24:  Plan for pelvic RT, possible cis/RT pending kidney function/tolerability. Disease felt not to be curative per rad onc note.    I discussed with patient and her daughter and they are still deciding on whether or not they want radiation.   ?    In ED, patient started on heparin gtt due to elevated D-dimer and cannot get CTA chest with contrast because of poor kidney function. Patient denies any hematochezia, melena, hematemesis or any other sources of bleeding. (28 May 2024 02:12)    Palliative consulted for complex medical decision making and symptom management in the setting of serious illness.  Met with patient and daughter at bedside  Patient endorsed that she's feeling okay, no sob. Has intermittent RLE pain for which she is using tylenol and hydrocodone at home  GOC as documented below.     PERTINENT PM/SXH:   Essential hypertension    Hyperlipidemia    Thyroid goiter    Diabetes    T2DM (type 2 diabetes mellitus)    CKD (chronic kidney disease)    Edema of right lower leg    Pelvic mass      H/O: hysterectomy    H/O lumpectomy    Bladder diverticulum    S/P cystoscopy      FAMILY HISTORY:  No pertinent family history in first degree relatives      Family Hx substance abuse [ ]yes [ ]no    ITEMS NOT CHECKED ARE NOT PRESENT    SOCIAL HISTORY:   Significant other/partner[ ]  Children[ x] 2 sons, 2 daughters, 1 son , lives with isaias   Episcopalian/Spirituality: [employer]ian  Substance hx:  [ ]   Tobacco hx:  [ ]   Alcohol hx: [ ]   Home Opioid hx:  [ x] I-Stop Reference No: [id number]  Living Situation: [x ]Home  [ ]Long term care  [ ]Rehab [ ]Other    ADVANCE DIRECTIVES:    DNR/MOLST  [ ]  Living Will  [ ]   DECISION MAKER(s):  [ ] Health Care Proxy(s)  [ x] Surrogate(s)  [ ] Guardian           Name(s): Phone Number(s): Giselle Loomis - [phone]    BASELINE (I)ADL(s) (prior to admission):  [place]: [ ]Total  [ ] Moderate [ ]Dependent    This report was requested by: Juanita Zuniga | Reference #: [id number]    Practitioner Count: 2  Pharmacy Count: 1  Current Opioid Prescriptions: 0  Current Benzodiazepine Prescriptions: 0  Current Stimulant Prescriptions: 0      Patient Demographic Information (PDI)       PDI	First Name	Last Name	Birth Date	Gender	Street Address	[facility] Code  ANEUDY Sarah	1945	Female	[address] [address]	[address]    Prescription Information      PDI Filter:    PDI	My Rx	Current Rx	Drug Type	Rx Written	Rx Dispensed	Drug	Quantity	Days Supply	Prescriber Name	Prescriber ESTRELLITA #	Payment Method	Dispenser  A	N	N	O	2024	acetaminophen-cod #2 tablet	12	4	Otoniel Liu MD	[id number]	Medicare	[id number]  A	N	N	O	05/10/2024	05/10/2024	hydrocodone-acetaminophen 5-300 mg tablet	20	10	David Pavon MD	[id number]	Insurance	[id number]    * - Details of Drug Type : O = Opioid, B = Benzodiazepine, S = Stimulant    * - Drugs marked with an asterisk are compound drugs. If the compound drug is made up of more than one controlled substance, then each controlled substance will be a separate row in the table.  Allergies    No Known Allergies    Intolerances    MEDICATIONS  (STANDING):  heparin  Infusion.  Unit(s)/Hr (11 mL/Hr) IV Continuous <Continuous>  melatonin 3 milliGRAM(s) Oral at bedtime    MEDICATIONS  (PRN):  acetaminophen     Tablet .. 650 milliGRAM(s) Oral every 6 hours PRN Temp greater or equal to 38C (100.4F), Mild Pain (1 - 3)    PRESENT SYMPTOMS: [ ]Unable to self-report  [ ] CPOT [ ] PAINADs [ ] RDOS  Source if other than patient:  [ ]Family   [ ]Team     Pain: [ ]yes [ ]no  QOL impact -   Location -                    Aggravating factors -  Quality -  Radiation -  Timing-  Severity (0-10 scale):  Minimal acceptable level/pain goal (0-10 scale):     CPOT:    https://www.[facility].org/getattachment/[email or web address]/Critical-Care-Pain-Observation-Tool-(CPOT)    PAIN AD Score:   http://geriatrictoolkit.[facility]/cog/painad.pdf (press ctrl +  left click to view)    Dyspnea:                           [ ]Mild [ ]Moderate [ ]Severe    RDOS:  0 to 2  minimal or no respiratory distress   3  mild distress  4 to 6 moderate distress  >7 severe distress  https://homecareinformation.net/handouts/hen/Respiratory_Distress_Observation_Scale.pdf (Ctrl +  left click to view)     Anxiety:                             [ ]Mild [ ]Moderate [ ]Severe  Fatigue:                             [ ]Mild [ ]Moderate [ ]Severe  Nausea:                             [ ]Mild [ ]Moderate [ ]Severe  Loss of appetite:              [ ]Mild [ ]Moderate [ ]Severe  Constipation:                    [ ]Mild [ ]Moderate [ ]Severe    PCSSQ[Palliative Care Spiritual Screening Question]   Severity (0-10): deferred  Score of 4 or > indicate consideration of Chaplaincy referral.  Chaplaincy Referral: [ ] yes [ ] refused [ ] following [ ] Deferred     Caregiver [place]? : [ ] yes [ ] no [ ] Deferred [ ] Declined             Social work referral [ ] Patient & Family Centered Care Referral [ ]     Anticipatory Grief present?:  [ ] yes [ ] no  [ ] Deferred                  Social work referral [ ] Chaplaincy Referral[ ]    Other Symptoms:  [ x]All other review of systems negative   [ ] Unable to obtain ROS due to poor mental status     Palliative Performance Status Version 2:         %    http://npcrc.org/files/news/palliative_performance_scale_ppsv2.pdf    PHYSICAL EXAM:  Vital Signs Last 24 Hrs  T(C): 37.6 (28 May 2024 09:31), Max: 38.1 (27 May 2024 18:41)  T(F): 99.6 (28 May 2024 09:31), Max: 100.6 (27 May 2024 18:41)  HR: 90 (28 May 2024 09:31) (82 - 104)  BP: 122/64 (28 May 2024 09:31) (118/59 - 140/76)  BP(mean): --  RR: 18 (28 May 2024 09:31) (15 - 18)  SpO2: 100% (28 May 2024 09:31) (98% - 100%)    Parameters below as of 28 May 2024 09:31  Patient On (Oxygen Delivery Method): room air     I&O's Summary    GENERAL: [ ]Cachexia    [ x]Alert  [ ]Oriented x   [ ]Lethargic  [ ]Unarousable  [x ]Verbal  [ ]Non-Verbal  Behavioral:   [ ] Anxiety  [ ] Delirium [ ] Agitation [ ] Other  HEENT:  [ ]Normal   [x ]Dry mouth   [ ]ET Tube/Trach  [ ]Oral lesions  PULMONARY:   [ x]Clear [ ]Tachypnea  [ ]Audible excessive secretions   [ ]Rhonchi        [ ]Right [ ]Left [ ]Bilateral  [ ]Crackles        [ ]Right [ ]Left [ ]Bilateral  [ ]Wheezing     [ ]Right [ ]Left [ ]Bilateral  [ ]Diminished breath sounds [ ]right [ ]left [ ]bilateral  CARDIOVASCULAR:    [x ]Regular [ ]Irregular [ ]Tachy  [ ]Bc [ ]Murmur [ ]Other  GASTROINTESTINAL:  [x ]Soft  [ ]Distended   [ ]+BS  [x ]Non tender [ ]Tender  [ ]Other [ ]PEG [ ]OGT/ NGT  Last BM:  GENITOURINARY:  [ x]Normal [ ] Incontinent   [ ]Oliguria/Anuria   [ ]Haro  MUSCULOSKELETAL:   [ ]Normal   [ x]Weakness  [ ]Bed/Wheelchair bound [ ]Edema  NEUROLOGIC:   [ ]No focal deficits  [ x]Cognitive impairment  [ ]Dysphagia [ ]Dysarthria [ ]Paresis [ ]Other   SKIN:   [ ]Normal  [ ]Rash  [ ]Other  [ ]Pressure ulcer(s)       Present on admission [ ]y [ ]n      CRITICAL CARE:  [ ] Shock Present  [ ]Septic [ ]Cardiogenic [ ]Neurologic [ ]Hypovolemic  [ ]  Vasopressors [ ]  Inotropes   [ ]Respiratory failure present [ ]Mechanical ventilation [ ]Non-invasive ventilatory support [ ]High flow    [ ]Acute  [ ]Chronic [ ]Hypoxic  [ ]Hypercarbic [ ]Other  [ ]Other organ failure     LABS:                        8.9    9.30  )-----------( 281      ( 28 May 2024 05:37 )             27.7   05-28    135  |  101  |  42<H>  ----------------------------<  119<H>  4.2   |  20<L>  |  1.81<H>    Ca    9.6      28 May 2024 05:37  Phos  3.3       Mg     2.20         TPro  x   /  Alb  x   /  TBili  x   /  DBili  x   /  AST  71<H>  /  ALT  53<H>  /  AlkPhos  x     PT/INR - ( 27 May 2024 21:10 )   PT: 13.1 sec;   INR: 1.18 ratio         PTT - ( 28 May 2024 12:28 )  PTT:45.0 sec    Urinalysis Basic - ( 28 May 2024 05:37 )    Color: x / Appearance: x / SG: x / pH: x  Gluc: 119 mg/dL / Ketone: x  / Bili: x / Urobili: x   Blood: x / Protein: x / Nitrite: x   Leuk Esterase: x / RBC: x / WBC x   Sq Epi: x / Non Sq Epi: x / Bacteria: x      RADIOLOGY & ADDITIONAL STUDIES:  < from: US Kidney and Bladder (24 @ 08:52) >  IMPRESSION:  Moderate right hydronephrosis.    Urinary bladder is distended, even after voiding. Please correlate for   urinary retention.    < end of copied text >  < from: Xray Chest 2 Views PA/Lat (24 @ 21:00) >    FINDINGS:  The heart is borderline in size.  The lungs are clear.  No pleural effusion or pneumothorax.    IMPRESSION:  Clear lungs.    < end of copied text >  < from: US Duplex Venous Lower Ext Complete, Bilateral (24 @ 00:09) >    IMPRESSION:  No evidence of deep venous thrombosis in either lower extremity.    < end of copied text >    PROTEIN CALORIE MALNUTRITION PRESENT: [ ]mild [ ]moderate [ ]severe [ ]underweight [ ]morbid obesity  https://www.andeal.org/vault/2440/web/files/ONC/Table_Clinical%20Characteristics%20to%20Document%20Malnutrition-White%20JV%20et%20al%2020.pdf    Height (cm): 154.9 (24 @ 18:41), 154.9 (24 @ 11:51), 157 (12-15-23 @ 09:11)  Weight (kg): 57 (24 @ 18:41), 60.9 (24:51), 61.2 (12-15-23 @ :11)  BMI (kg/m2): 23.8 (24 @ 18:41), 25.4 (24:), 24.8 (12-15-23 @ 09:11)    [ ]PPSV2 < or = to 30% [ ]significant weight loss  [ ]poor nutritional intake  [ ]anasarca[ ]Artificial Nutrition      Other REFERRALS:  [ ]Hospice  [ ]Child Life  [ ]Social Work  [ ]Case management [ ]Holistic Therapy     Goals of Care Document:

## 2024-05-28 NOTE — CONSULT NOTE ADULT - PROBLEM SELECTOR RECOMMENDATION 2
Also, R Hydronephrosis  Cr 2.2 on arrival  US KUB with mod R hydro and bladder distention despite voiding  Avoid nephrotoxins

## 2024-05-28 NOTE — H&P ADULT - PROBLEM SELECTOR PLAN 3
Creatinine 2.02 up from 1.51. This likely is related to pelvic mass causing R severe hydronephrosis which is known and was evaluated by  as outpatient.   -US renal and bladder  -daily BMP  -depending on US findings, may require  eval inpatient for stent/nephrostomy tube

## 2024-05-28 NOTE — H&P ADULT - NSHPLABSRESULTS_GEN_ALL_CORE
Personally reviewed labs:                        8.6    9.58  )-----------( 247      ( 27 May 2024 21:10 )             26.0     05-27-24 @ 21:10    133<L>  |  99  |  43<H>             --------------------------< 153<H>     4.2  |  18<L>  | 2.02<H>    eGFR AA: --  eGFR N-AA: --    Calcium: 9.1  Phosphorus: --  Magnesium: --    AST: 94<H>    ALT: 55<H>  AlkPhos: 61  Protein: 8.2  Albumin: 3.3  TBili: 0.9  D-Bili: --          RADIOLOGY & ADDITIONAL TESTS:    EKG my independent interpretation: NSR, TWI in V2, no STD or JUANA      Imaging personally reviewed:    CXR:  FINDINGS:  The heart is normal in size.  The lungs are clear.  No pleural effusion or pneumothorax.    IMPRESSION:  Clear lungs.      Duplex:  IMPRESSION:  No evidence of deep venous thrombosis in either lower extremity.

## 2024-05-28 NOTE — H&P ADULT - NSICDXPASTMEDICALHX_GEN_ALL_CORE_FT
PAST MEDICAL HISTORY:  CKD (chronic kidney disease)     Edema of right lower leg     Essential hypertension     Hyperlipidemia     Pelvic mass     T2DM (type 2 diabetes mellitus)     Thyroid goiter      none

## 2024-05-28 NOTE — H&P ADULT - PROBLEM SELECTOR PLAN 1
Substernal CP for a few minutes that self resolved. Not hypoxic, HDS, no tachycardia. Has RLE edema but no DVT on duplex. D-dimer 5084. Given hx of malignancy and elevated D-dimer, heparin gtt initiated empirically in ED. Overall unlikely this represents acute PE given no current CP/SOB, hypoxia but will r/o with V/Q scan. D-dimer can be elevated due to her metastatic disease  EKG with isolated TWI in V2, trops 32--27. Not ACS  -tele  -echo  -hep gtt  -V/Q scan to r/o PE

## 2024-05-28 NOTE — CONSULT NOTE ADULT - CONVERSATION DETAILS
Palliative consulted for complex medical decision making in the setting of pelvic malignancy.  Spoke with pt along with daughter Oma to introduce role and scope of palliative care team as supportive in the setting of complex comorbidities/serious illnesses, to assist with complex medical decision making and any associated pain or symptom management. We reviewed baseline function, comorbidities, current hospital course.    Patient defers a lot of conversation to the daughter Oma (who works as a PCA at a nursing home) who shared that pt w/ diagnosed with cancer last year after she had a fall and imaging showed this pelvic mass. Patient herself did not have any symptoms. She shared that pt did not require surgery as she's had hysterectomy in the past and was offered RT. As of now they've not started RT as they met with Rad/onc team last month and are thinking about whether or treatment makes best sense as they would not want burden of treatment to be higher than the burden of the cancer itself.  She shared that patient has been functional until 3 weeks ago with now increasing weakness and RLE weakness. They're amenable to speaking to Rad/onc to further discuss options.    I shared that if patient/family opt to forgo treatment for cancer in favor of qol with symptoms mangement than hospice is an option for patient. Educated family on the philosophy of hospice care and explained the various settings and criteria in which hospice can be delivered (home vs. nursing home vs. inpatient hospice). Discussed the services provided under hospice services emphasizing the goal of elevating patient's quality of life and optimizing symptom management and avoiding unnecessary future hospitalizations. Oma shared that hospice was a scary thing to think about but she will think about it. She shared that family makes decisions together -- patient has three other children.    Discussed advanced directives including CPR and mechanical ventilation in setting of malignancy. Reviewed the risks and benefits of these interventions at the EOL in setting of malignancy sharing that these interventions might pose more burden than benefit. Oma shared mom would want to pass away naturally without machines. Offered to complete molst but she deferred as she wants to discuss first with her siblings.

## 2024-05-28 NOTE — H&P ADULT - NSHPPHYSICALEXAM_GEN_ALL_CORE
PHYSICAL EXAM:  Vital Signs Last 24 Hrs  T(C): 36.9 (05-28-24 @ 01:35)  T(F): 98.5 (05-28-24 @ 01:35), Max: 100.6 (05-27-24 @ 18:41)  HR: 84 (05-28-24 @ 01:35) (82 - 104)  BP: 140/76 (05-28-24 @ 01:35)  BP(mean): --  RR: 17 (05-28-24 @ 01:35) (15 - 18)  SpO2: 100% (05-28-24 @ 01:35) (98% - 100%)  Wt(kg): --    Constitutional: NAD, awake and alert, well developed  EYES: EOMI, conjunctiva clear  ENT:  Normal Hearing, no tonsillar exudates   Neck: Soft and supple , no thyromegaly   Respiratory: Breath sounds are clear bilaterally, No wheezing, rales or rhonchi, no tachypnea, no accessory muscle use  Cardiovascular: S1 and S2, regular rate and rhythm, no Murmurs, gallops or rubs, no JVD, RLE edema  Gastrointestinal: Bowel Sounds present, soft, nontender, nondistended, no guarding, no rebound  Extremities: No cyanosis or clubbing; warm to touch  Vascular: 2+ peripheral pulses lower ex  Neurological: No focal deficits, CN II-XII intact bilaterally, sensation to light touch intact in all extremities. 5/5 strength in all extremities  Musculoskeletal:RLE ROM somewhat limited 2/2 pain and edema, otherwise strength intact

## 2024-05-28 NOTE — H&P ADULT - PROBLEM SELECTOR PLAN 2
Temp 100.6. CXR clear. Per daughter has foul smelling concentrated urine  -f/u UA and UCx  -f/u BCx x2

## 2024-05-28 NOTE — H&P ADULT - HISTORY OF PRESENT ILLNESS
79F with PMHx CKD (2/2 R hydronephrosis from obstructing pelvic mass), Chronic RLE edema since 9/2023, HTN, HLD, goiter, pelvic mass with mets initially dx 7/2023 with LN bx 11/2023 with SCC vs urothelial carcinoma (no treatment, pending patient/family decision on RT) presenting with chest pain 5/27 AM. The patient was in USOH until 6AM when she suddenly felt substernal needle-like chest pain. This lasted only a few minutes and went away on its own. There was no radiation or exertional component. She denies SOB, LH, dizziness. Of note yesterday she felt some heart fluttering. Per daughter she felt as though she may have a temperature but this was not confirmed at [facility] this week. Patient denies fevers, chills, cough, abdominal pain, vomiting, diarrhea, dysuria, hematuria. Per daughter she has dark concentrated urine and some odor. Over the last week her RLE pain has gotten worse limiting her ambulation. She denies any weakness, numbness, tingling, saddle anesthesia, back pain, trauma. She cannot make it to the bathroom in time due to being in the bed and urinates on herself. Now she uses a diaper. Otherwise denies loss of sensation when urinating. Denies fecal incontinence. Of note patient is following with gyn onc and rad onc regarding her pelvic mass. She is deciding on if she wants RT per notes.       Onc hx as per gyn onc note 2/21/2024 Dr. Gary Goldberg  - Pelvic mass was initially discovered in July when patient presented to the ED s/p fall.  At this time, patient was noted to have an IRENE and unable to void.  - Pt referred to Urology - their workup was concerning for 12cm diverticulum and non-functioning left kidney.  During this time period patient noted swelling of her RLE.  For this swelling patient was referred to the ED where she was found to have 17cm pelvic mass with pelvic and para-aortic lymph nodes.  - s/p Cysto, R ureteroscopy, retrograde pyelogram , vaginoscopy (1/5/24): No evidence of bladder diverticulum, and no connection to mass visualized in collecting system. Patient discussed at  tumor board, and believed to be gynecologic in origin.  - s/p IR biopsy pelvic lymph node: SCC vs urothelial carcinoma  -Pelvic MR(1/30/24): S/p hysterectomy per history. Complex solid and cystic structure 19cm w/ rind of soft tissue measuring up to 1.7cm in thickness w/ enhancement and diffusion restriction abutting/contiguous with vaginal cuff. Previously measuring 17cm. Multiple enlarged centrally necrotic pelvic LAD. Stable severe R hydronephrosis 2/2 mass effect.  ?  - Pt discussed at GYN ONC Tumor Board on 1/17/24. 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 Plan for pelvic RT, possible cis/RT pending kidney function/tolerability.    Per rad onc note Dr. Estrella Ling 2/22/24:  Plan for pelvic RT, possible cis/RT pending kidney function/tolerability. Disease felt not to be curative per rad onc note.    I discussed with patient and her daughter and they are still deciding on whether or not they want radiation.   ?    In ED, patient started on heparin gtt due to elevated D-dimer and cannot get CTA chest with contrast because of poor kidney function. Patient denies any hematochezia, melena, hematemesis or any other sources of bleeding.

## 2024-05-28 NOTE — H&P ADULT - TIME-BASED
06/12/23 0949   Consent   Risks, benefits, and alternatives have been discussed with the patient/patient representative, and patient/patient representative agrees to proceed Yes   PHYSICAL EXAM (complete on day of procedure, regardless of whether valid H&P is present)   History and Physical Reviewed Patient has valid H&P within 30 days. I have reviewed and there are no changes.   Airway Risk History No previous complications   Airway Anatomy  Class II   Heart  Normal   Lungs  Normal   LOC/Mental Status  Normal   Other Findings   Reviewed current medications and allergies Yes   Pertinent lab/diagnostic test reviewed Yes   Sedation Risk Assessment   Risk Status ASA Class II - Normal patient with mild systemic disease   Plan for Sedation Minimal Sedation   Indications for Procedure/Pre-Procedure Diagnosis and Planned Procedure non ruptured LMCA aneurysm / diagnostic cerebral angiogram   EKG Monitoring Yes     Lise Doll PA-C     79

## 2024-05-28 NOTE — H&P ADULT - ASSESSMENT
79F with PMHx CKD (2/2 R hydronephrosis from obstructing pelvic mass), Chronic RLE edema since 9/2023, HTN, HLD, goiter, pelvic mass with mets initially dx 7/2023 with LN bx 11/2023 with SCC vs urothelial carcinoma (no treatment, pending patient/family decision on RT) presenting with chest pain 5/27 AM.

## 2024-05-28 NOTE — H&P ADULT - PROBLEM SELECTOR PLAN 4
Per onc notes likely SCC vs urothelial carcinoma based off pelvic LN bx results 11/2023. Per gyn onc and rad onc notes, patient has been deciding on RT.   Rad onc is Dr. Ling  Gyn onc is Dr. Goldberg  -f/u further GOC with patient and daughter and they would like RT for this. Per rad onc her disease is not curative  -pall care consult placed  -non-urgent gyn onc c/s to be called in AM  -non-urgent rad onc c/s to be called in AM

## 2024-05-28 NOTE — CONSULT NOTE ADULT - PROBLEM SELECTOR RECOMMENDATION 3
Pt with pelvic mass, bx showing SCC vs. 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 Goldberg at [facility] and Dr. Ling rad-onc, has yet to pursue RT and is "considering it" as they shared they worry about risks/benefits of treatment as patient herself is asymptomatic and would not want treatment that may worsen QOL.   - rad-onc consulted  - patient denies any neoplasm related pain;

## 2024-05-28 NOTE — CONSULT NOTE ADULT - PROBLEM SELECTOR RECOMMENDATION 4
GOC as documented above: in summary, family wish to discuss risk/benefits of treatment for pelvic malignancy with specialists as they would want to for patient to suffer from treatment as currently her symptoms are minimal. Pt defers a lot of decisions to daughter Oma who is a healthcare work at a nursing home. Oma shared mom's QOL is important and as such would not want intubation or cpr but doesn't wish to complete molst without speaking to patient's other children.

## 2024-05-28 NOTE — PATIENT PROFILE ADULT - FALL HARM RISK - HARM RISK INTERVENTIONS

## 2024-05-28 NOTE — CONSULT NOTE ADULT - PROBLEM SELECTOR RECOMMENDATION 9
Pt presented with weakness, fatigue  Started on heparin gtt d/t concern for possible PE; unable to obtain CTA  Currently on heparin gtt  VQ scan: low probability of PE.

## 2024-05-28 NOTE — CONSULT NOTE ADULT - PROBLEM SELECTOR RECOMMENDATION 5
Thank you for allowing us to participate in your patient's care. We will continue to follow with you. Please page [phone] for any q's or c's. The Geriatric and Palliative Medicine service has coverage 24 hours a day/ 7 days a week to provide medical recommendations regarding symptom management needs via telephone.    Juanita Zuniga MD  Palliative Medicine

## 2024-05-28 NOTE — PATIENT PROFILE ADULT - FUNCTIONAL ASSESSMENT - BASIC MOBILITY 6.
4-calculated by average/Not able to assess (calculate score using [facility] averaging method)

## 2024-05-28 NOTE — CONSULT NOTE ADULT - ASSESSMENT
79F with PMHx CKD (2/2 R hydronephrosis from obstructing pelvic mass), Chronic RLE edema since 9/2023, HTN, HLD, goiter, pelvic mass with mets initially dx 7/2023 with LN bx 11/2023 with SCC vs urothelial carcinoma (no treatment, pending patient/family decision on RT) presenting with chest pain 5/27 AM admitted for c/f possible PE, on heparin gtt. Palliative consulted for goals of care and symptoms management.  79F with PMHx CKD (2/2 R hydronephrosis from obstructing pelvic mass), Chronic RLE edema since 9/2023, HTN, HLD, goiter, pelvic mass with mets initially dx 7/2023 with LN bx 11/2023 with SCC vs urothelial carcinoma (no treatment, pending patient/family decision on RT) presenting with chest pain 5/27 AM admitted for c/f possible PE, on heparin gtt. Palliative consulted for goals of care and symptoms management.

## 2024-05-29 ENCOUNTER — RESULT REVIEW (OUTPATIENT)
Age: 79
End: 2024-05-29

## 2024-05-29 DIAGNOSIS — N17.9 ACUTE KIDNEY FAILURE, UNSPECIFIED: ICD-10-CM

## 2024-05-29 DIAGNOSIS — R06.02 SHORTNESS OF BREATH: ICD-10-CM

## 2024-05-29 DIAGNOSIS — R60.0 LOCALIZED EDEMA: ICD-10-CM

## 2024-05-29 DIAGNOSIS — N13.30 UNSPECIFIED HYDRONEPHROSIS: ICD-10-CM

## 2024-05-29 DIAGNOSIS — E11.9 TYPE 2 DIABETES MELLITUS WITHOUT COMPLICATIONS: ICD-10-CM

## 2024-05-29 DIAGNOSIS — R63.8 OTHER SYMPTOMS AND SIGNS CONCERNING FOOD AND FLUID INTAKE: ICD-10-CM

## 2024-05-29 DIAGNOSIS — D53.9 NUTRITIONAL ANEMIA, UNSPECIFIED: ICD-10-CM

## 2024-05-29 DIAGNOSIS — R74.01 ELEVATION OF LEVELS OF LIVER TRANSAMINASE LEVELS: ICD-10-CM

## 2024-05-29 DIAGNOSIS — C80.1 MALIGNANT (PRIMARY) NEOPLASM, UNSPECIFIED: ICD-10-CM

## 2024-05-29 DIAGNOSIS — N39.0 URINARY TRACT INFECTION, SITE NOT SPECIFIED: ICD-10-CM

## 2024-05-29 DIAGNOSIS — R79.89 OTHER SPECIFIED ABNORMAL FINDINGS OF BLOOD CHEMISTRY: ICD-10-CM

## 2024-05-29 LAB
ANION GAP SERPL CALC-SCNC: 12 MMOL/L — SIGNIFICANT CHANGE UP (ref 7–14)
BUN SERPL-MCNC: 33 MG/DL — HIGH (ref 7–23)
CALCIUM SERPL-MCNC: 9.3 MG/DL — SIGNIFICANT CHANGE UP (ref 8.4–10.5)
CHLORIDE SERPL-SCNC: 100 MMOL/L — SIGNIFICANT CHANGE UP (ref 98–107)
CO2 SERPL-SCNC: 21 MMOL/L — LOW (ref 22–31)
CREAT SERPL-MCNC: 1.52 MG/DL — HIGH (ref 0.5–1.3)
D DIMER BLD IA.RAPID-MCNC: 5848 NG/ML DDU — SIGNIFICANT CHANGE UP
EGFR: 34 ML/MIN/1.73M2 — LOW
FLUAV AG NPH QL: SIGNIFICANT CHANGE UP
FLUBV AG NPH QL: SIGNIFICANT CHANGE UP
GLUCOSE BLDC GLUCOMTR-MCNC: 108 MG/DL — HIGH (ref 70–99)
GLUCOSE BLDC GLUCOMTR-MCNC: 126 MG/DL — HIGH (ref 70–99)
GLUCOSE BLDC GLUCOMTR-MCNC: 141 MG/DL — HIGH (ref 70–99)
GLUCOSE BLDC GLUCOMTR-MCNC: 173 MG/DL — HIGH (ref 70–99)
GLUCOSE SERPL-MCNC: 112 MG/DL — HIGH (ref 70–99)
HCT VFR BLD CALC: 26.3 % — LOW (ref 34.5–45)
HGB BLD-MCNC: 8.5 G/DL — LOW (ref 11.5–15.5)
MAGNESIUM SERPL-MCNC: 2 MG/DL — SIGNIFICANT CHANGE UP (ref 1.6–2.6)
MCHC RBC-ENTMCNC: 30.5 PG — SIGNIFICANT CHANGE UP (ref 27–34)
MCHC RBC-ENTMCNC: 32.3 GM/DL — SIGNIFICANT CHANGE UP (ref 32–36)
MCV RBC AUTO: 94.3 FL — SIGNIFICANT CHANGE UP (ref 80–100)
NRBC # BLD: 0 /100 WBCS — SIGNIFICANT CHANGE UP (ref 0–0)
NRBC # FLD: 0 K/UL — SIGNIFICANT CHANGE UP (ref 0–0)
PHOSPHATE SERPL-MCNC: 3 MG/DL — SIGNIFICANT CHANGE UP (ref 2.5–4.5)
PLATELET # BLD AUTO: 295 K/UL — SIGNIFICANT CHANGE UP (ref 150–400)
POTASSIUM SERPL-MCNC: 4.3 MMOL/L — SIGNIFICANT CHANGE UP (ref 3.5–5.3)
POTASSIUM SERPL-SCNC: 4.3 MMOL/L — SIGNIFICANT CHANGE UP (ref 3.5–5.3)
RBC # BLD: 2.79 M/UL — LOW (ref 3.8–5.2)
RBC # FLD: 13.5 % — SIGNIFICANT CHANGE UP (ref 10.3–14.5)
RSV RNA NPH QL NAA+NON-PROBE: SIGNIFICANT CHANGE UP
SARS-COV-2 RNA SPEC QL NAA+PROBE: SIGNIFICANT CHANGE UP
SODIUM SERPL-SCNC: 133 MMOL/L — LOW (ref 135–145)
WBC # BLD: 9.37 K/UL — SIGNIFICANT CHANGE UP (ref 3.8–10.5)
WBC # FLD AUTO: 9.37 K/UL — SIGNIFICANT CHANGE UP (ref 3.8–10.5)

## 2024-05-29 PROCEDURE — 93306 TTE W/DOPPLER COMPLETE: CPT | Mod: 26

## 2024-05-29 PROCEDURE — 99233 SBSQ HOSP IP/OBS HIGH 50: CPT

## 2024-05-29 RX ORDER — LIDOCAINE 4 G/100G
1 CREAM TOPICAL DAILY
Refills: 0 | Status: DISCONTINUED | OUTPATIENT
Start: 2024-05-29 | End: 2024-06-04

## 2024-05-29 RX ADMIN — HEPARIN SODIUM 5000 UNIT(S): 5000 INJECTION INTRAVENOUS; SUBCUTANEOUS at 22:08

## 2024-05-29 RX ADMIN — LIDOCAINE 1 PATCH: 4 CREAM TOPICAL at 18:01

## 2024-05-29 RX ADMIN — HEPARIN SODIUM 5000 UNIT(S): 5000 INJECTION INTRAVENOUS; SUBCUTANEOUS at 06:23

## 2024-05-29 RX ADMIN — CEFTRIAXONE 100 MILLIGRAM(S): 500 INJECTION, POWDER, FOR SOLUTION INTRAMUSCULAR; INTRAVENOUS at 14:59

## 2024-05-29 RX ADMIN — HEPARIN SODIUM 5000 UNIT(S): 5000 INJECTION INTRAVENOUS; SUBCUTANEOUS at 14:59

## 2024-05-29 RX ADMIN — Medication 650 MILLIGRAM(S): at 18:01

## 2024-05-29 RX ADMIN — Medication 3 MILLIGRAM(S): at 22:09

## 2024-05-29 NOTE — DIETITIAN INITIAL EVALUATION ADULT - OTHER CALCULATIONS
Apparent ~8 lb (6%) weight loss x4 months, per history obtained via chart: (5/27 dosing) 125.7 lbs / 57 kg, (1/5) 134 lbs, (7/11/23) 133 lbs.  Ideal Body Weight: 105 lbs / 47.7 kg +/-10%

## 2024-05-29 NOTE — DIETITIAN INITIAL EVALUATION ADULT - PERTINENT LABORATORY DATA
(5/29) Na 133<L>, BUN 33<H>, Cr 1.52<H>, <H>, K+ 4.3, Phos 3.0, Mg 2.00  (5/28) HbA1c 4.9%  POCT: (5/29) 126

## 2024-05-29 NOTE — DIETITIAN NUTRITION RISK NOTIFICATION - ADDITIONAL COMMENTS/DIETITIAN RECOMMENDATIONS
Please see Dietitian Initial Assessment for complete recommendations.  Juliette Pack, KARYN, CDN #[id number]  Also available on Microsoft Teams

## 2024-05-29 NOTE — PROGRESS NOTE ADULT - SUBJECTIVE AND OBJECTIVE BOX
[facility] Department of Hospital Medicine  Brandi Wei DO  Available on MS Teams  Pager: [phone]    Patient is a 79y old  Female who presents with a chief complaint of Dyspnea     (29 May 2024 10:44)    Subjective:  Pt seen and examined at bedside resting comfortably, eating lunch and in good spirits. Feels well. Discussed role for rad-onc evaluation -- wants to find out more information about what would be offered and how it may benefit her if she chooses to proceed. Denies having any symptoms related to malignancy. Does have mild neck pain today, she thinks she slept funny. Otherwise eating well. Daughter to visit later today.    VITAL SIGNS:  T(C): 36.7 (05-29-24 @ 12:22), Max: 37.6 (05-28-24 @ 21:26)  T(F): 98.1 (05-29-24 @ 12:22), Max: 99.7 (05-28-24 @ 21:26)  HR: 73 (05-29-24 @ 12:22) (73 - 90)  BP: 127/78 (05-29-24 @ 12:22) (127/78 - 135/70)  BP(mean): --  RR: 17 (05-29-24 @ 12:22) (17 - 18)  SpO2: 100% (05-29-24 @ 12:22) (96% - 100%)  Wt(kg): --    PHYSICAL EXAM:  Constitutional: resting comfortably in bed; NAD  Head: NC/AT  Eyes: PERRL, EOMI, anicteric sclera  ENT: no nasal discharge; MMM  Neck: supple; no JVD  Respiratory: CTA B/L; no W/R/R  Cardiac: +S1/S2; RRR; no M/R/G  Gastrointestinal: soft, NT/ND; no rebound or guarding; +BSx4  Extremities: WWP, no clubbing or cyanosis; no peripheral edema  Musculoskeletal: NROM x4; no joint swelling, tenderness or erythema; mm strength 5/5 throughout all extremities; sensation intact and equal through all extremities  Vascular: 2+ radial, DP/PT pulses B/L  Dermatologic: skin warm, dry and intact; no rashes, wounds, or scars  Neurologic: AAOx3; CNII-XII grossly intact; no focal deficits  Psychiatric: affect and characteristics of appearance, verbalizations, behaviors are appropriate    MEDICATIONS  (STANDING):  cefTRIAXone   IVPB 1000 milliGRAM(s) IV Intermittent every 24 hours  heparin   Injectable 5000 Unit(s) SubCutaneous every 8 hours  lidocaine   4% Patch 1 Patch Transdermal daily  melatonin 3 milliGRAM(s) Oral at bedtime    MEDICATIONS  (PRN):  acetaminophen     Tablet .. 650 milliGRAM(s) Oral every 6 hours PRN Temp greater or equal to 38C (100.4F), Mild Pain (1 - 3)    LABS:                        8.5    9.37  )-----------( 295      ( 29 May 2024 05:57 )             26.3     05-29    133<L>  |  100  |  33<H>  ----------------------------<  112<H>  4.3   |  21<L>  |  1.52<H>    Ca    9.3      29 May 2024 05:57  Phos  3.0     05-29  Mg     2.00     05-29    TPro  x   /  Alb  x   /  TBili  x   /  DBili  x   /  AST  71<H>  /  ALT  53<H>  /  AlkPhos  x   05-28    PT/INR - ( 27 May 2024 21:10 )   PT: 13.1 sec;   INR: 1.18 ratio         PTT - ( 28 May 2024 12:28 )  PTT:45.0 sec  Urinalysis Basic - ( 29 May 2024 05:57 )    Color: x / Appearance: x / SG: x / pH: x  Gluc: 112 mg/dL / Ketone: x  / Bili: x / Urobili: x   Blood: x / Protein: x / Nitrite: x   Leuk Esterase: x / RBC: x / WBC x   Sq Epi: x / Non Sq Epi: x / Bacteria: x      CAPILLARY BLOOD GLUCOSE      POCT Blood Glucose.: 173 mg/dL (29 May 2024 12:12)      RADIOLOGY & ADDITIONAL TESTS: Reviewed.   [facility] Department of Hospital Medicine  Brandi Wei DO  Available on MS Teams  Pager: [phone]    Patient is a 79y old  Female who presents with a chief complaint of Dyspnea     (29 May 2024 10:44)    Subjective:  Pt seen and examined at bedside resting comfortably, eating lunch and in good spirits. Feels well. Discussed role for rad-onc evaluation -- wants to find out more information about what would be offered and how it may benefit her if she chooses to proceed. Denies having any symptoms related to malignancy. Does have mild neck pain today, she thinks she slept funny. Otherwise eating well. Daughter to visit later today.    VITAL SIGNS:  T(C): 36.7 (05-29-24 @ 12:22), Max: 37.6 (05-28-24 @ 21:26)  T(F): 98.1 (05-29-24 @ 12:22), Max: 99.7 (05-28-24 @ 21:26)  HR: 73 (05-29-24 @ 12:22) (73 - 90)  BP: 127/78 (05-29-24 @ 12:22) (127/78 - 135/70)  BP(mean): --  RR: 17 (05-29-24 @ 12:22) (17 - 18)  SpO2: 100% (05-29-24 @ 12:22) (96% - 100%)  Wt(kg): --    PHYSICAL EXAM:  Constitutional: resting comfortably in bed; NAD  Head: NC/AT  Eyes: PERRL, EOMI, anicteric sclera  ENT: no nasal discharge; MMM  Neck: supple; no JVD  Respiratory: CTA B/L; no W/R/R  Cardiac: +S1/S2; RRR; no M/R/G  Gastrointestinal: soft, NT/ND; no rebound or guarding; +BSx4  Extremities: WWP, no clubbing or cyanosis; RLE > LLE 1+ edema  Musculoskeletal: NROM x4; no joint swelling, tenderness or erythema; mm strength 5/5 throughout all extremities; sensation intact and equal through all extremities  Vascular: 2+ radial, DP/PT pulses B/L  Dermatologic: skin warm, dry and intact; no rashes, wounds, or scars  Neurologic: AAOx3; CNII-XII grossly intact; no focal deficits  Psychiatric: affect and characteristics of appearance, verbalizations, behaviors are appropriate    MEDICATIONS  (STANDING):  cefTRIAXone   IVPB 1000 milliGRAM(s) IV Intermittent every 24 hours  heparin   Injectable 5000 Unit(s) SubCutaneous every 8 hours  lidocaine   4% Patch 1 Patch Transdermal daily  melatonin 3 milliGRAM(s) Oral at bedtime    MEDICATIONS  (PRN):  acetaminophen     Tablet .. 650 milliGRAM(s) Oral every 6 hours PRN Temp greater or equal to 38C (100.4F), Mild Pain (1 - 3)    LABS:                        8.5    9.37  )-----------( 295      ( 29 May 2024 05:57 )             26.3     05-29    133<L>  |  100  |  33<H>  ----------------------------<  112<H>  4.3   |  21<L>  |  1.52<H>    Ca    9.3      29 May 2024 05:57  Phos  3.0     05-29  Mg     2.00     05-29    TPro  x   /  Alb  x   /  TBili  x   /  DBili  x   /  AST  71<H>  /  ALT  53<H>  /  AlkPhos  x   05-28    PT/INR - ( 27 May 2024 21:10 )   PT: 13.1 sec;   INR: 1.18 ratio         PTT - ( 28 May 2024 12:28 )  PTT:45.0 sec  Urinalysis Basic - ( 29 May 2024 05:57 )    Color: x / Appearance: x / SG: x / pH: x  Gluc: 112 mg/dL / Ketone: x  / Bili: x / Urobili: x   Blood: x / Protein: x / Nitrite: x   Leuk Esterase: x / RBC: x / WBC x   Sq Epi: x / Non Sq Epi: x / Bacteria: x      CAPILLARY BLOOD GLUCOSE      POCT Blood Glucose.: 173 mg/dL (29 May 2024 12:12)      RADIOLOGY & ADDITIONAL TESTS: Reviewed.

## 2024-05-29 NOTE — DIETITIAN INITIAL EVALUATION ADULT - NSICDXPASTMEDICALHX_GEN_ALL_CORE_FT
PAST MEDICAL HISTORY:  CKD (chronic kidney disease)     Edema of right lower leg     Essential hypertension     Hyperlipidemia     Pelvic mass     T2DM (type 2 diabetes mellitus)     Thyroid goiter

## 2024-05-29 NOTE — DIETITIAN INITIAL EVALUATION ADULT - ADD RECOMMEND
1) Recommend regular diet + Ensure Max 1 PO 3x daily (provides 150 kcal, 30 gm protein per 11oz serving).  2) Reviewed alternative menu items dense in kcals/protein, obtained food preferences and will honor as able.  3) Obtain weekly weights.

## 2024-05-29 NOTE — DIETITIAN INITIAL EVALUATION ADULT - OTHER INFO
Per chart, pt is 79 year old female PMH CKD (secondary to R hydronephrosis from obstructing pelvic mass), chronic RLE edema (since 9/2023), HTN, HLD, goiter, pelvic mass with mets (initially dx 7/2023, not on treatment) presenting with chest pain. Palliative was consulted for GOC.     Pt confirms NKFA. Pt lives at home, daughter assists with meal preparation. Pt reports generally poor appetite/PO inake PTA, consumes small portions and supplements with nutrition shakes. Pt requires extended time and energy to chew, denies difficulties swallowing. History of type 2 DM, not on medications, HbA1c (5/28) 4.9%.    Breakfast tray visible with <25% consumption during time of visit. Pt amenable to provision of Ensure, yogurt, ice cream and pudding. No flavor preferences vocalized. Pt requires assistance with tray set up but is able to feed self independently. No noted GI distress, unknown last BM; no bowel regimen ordered at this time.

## 2024-05-29 NOTE — PROGRESS NOTE ADULT - PROBLEM SELECTOR PLAN 10
- A1C 4.9 -- ?falsely low in setting of kidney dysfunction  - - A1C 4.9 -- ?falsely low in setting of kidney dysfunction  - monitor glu on BMP

## 2024-05-30 PROBLEM — R19.00 INTRA-ABDOMINAL AND PELVIC SWELLING, MASS AND LUMP, UNSPECIFIED SITE: Chronic | Status: ACTIVE | Noted: 2024-05-28

## 2024-05-30 LAB
-  AMOXICILLIN/CLAVULANIC ACID: SIGNIFICANT CHANGE UP
-  AMPICILLIN/SULBACTAM: SIGNIFICANT CHANGE UP
-  AMPICILLIN/SULBACTAM: SIGNIFICANT CHANGE UP
-  AMPICILLIN: SIGNIFICANT CHANGE UP
-  AMPICILLIN: SIGNIFICANT CHANGE UP
-  AZTREONAM: SIGNIFICANT CHANGE UP
-  AZTREONAM: SIGNIFICANT CHANGE UP
-  CEFAZOLIN: SIGNIFICANT CHANGE UP
-  CEFAZOLIN: SIGNIFICANT CHANGE UP
-  CEFEPIME: SIGNIFICANT CHANGE UP
-  CEFEPIME: SIGNIFICANT CHANGE UP
-  CEFOXITIN: SIGNIFICANT CHANGE UP
-  CEFTRIAXONE: SIGNIFICANT CHANGE UP
-  CEFTRIAXONE: SIGNIFICANT CHANGE UP
-  CEFUROXIME: SIGNIFICANT CHANGE UP
-  CEFUROXIME: SIGNIFICANT CHANGE UP
-  CIPROFLOXACIN: SIGNIFICANT CHANGE UP
-  CIPROFLOXACIN: SIGNIFICANT CHANGE UP
-  ERTAPENEM: SIGNIFICANT CHANGE UP
-  ERTAPENEM: SIGNIFICANT CHANGE UP
-  GENTAMICIN: SIGNIFICANT CHANGE UP
-  GENTAMICIN: SIGNIFICANT CHANGE UP
-  IMIPENEM: SIGNIFICANT CHANGE UP
-  LEVOFLOXACIN: SIGNIFICANT CHANGE UP
-  LEVOFLOXACIN: SIGNIFICANT CHANGE UP
-  MEROPENEM: SIGNIFICANT CHANGE UP
-  MEROPENEM: SIGNIFICANT CHANGE UP
-  NITROFURANTOIN: SIGNIFICANT CHANGE UP
-  NITROFURANTOIN: SIGNIFICANT CHANGE UP
-  PIPERACILLIN/TAZOBACTAM: SIGNIFICANT CHANGE UP
-  PIPERACILLIN/TAZOBACTAM: SIGNIFICANT CHANGE UP
-  TOBRAMYCIN: SIGNIFICANT CHANGE UP
-  TOBRAMYCIN: SIGNIFICANT CHANGE UP
-  TRIMETHOPRIM/SULFAMETHOXAZOLE: SIGNIFICANT CHANGE UP
-  TRIMETHOPRIM/SULFAMETHOXAZOLE: SIGNIFICANT CHANGE UP
ANION GAP SERPL CALC-SCNC: 14 MMOL/L — SIGNIFICANT CHANGE UP (ref 7–14)
APTT BLD: 26.6 SEC — SIGNIFICANT CHANGE UP (ref 24.5–35.6)
BUN SERPL-MCNC: 28 MG/DL — HIGH (ref 7–23)
CALCIUM SERPL-MCNC: 9.4 MG/DL — SIGNIFICANT CHANGE UP (ref 8.4–10.5)
CHLORIDE SERPL-SCNC: 98 MMOL/L — SIGNIFICANT CHANGE UP (ref 98–107)
CO2 SERPL-SCNC: 20 MMOL/L — LOW (ref 22–31)
CREAT SERPL-MCNC: 1.34 MG/DL — HIGH (ref 0.5–1.3)
CULTURE RESULTS: ABNORMAL
D DIMER BLD IA.RAPID-MCNC: HIGH NG/ML DDU
EGFR: 40 ML/MIN/1.73M2 — LOW
GLUCOSE BLDC GLUCOMTR-MCNC: 108 MG/DL — HIGH (ref 70–99)
GLUCOSE BLDC GLUCOMTR-MCNC: 127 MG/DL — HIGH (ref 70–99)
GLUCOSE BLDC GLUCOMTR-MCNC: 132 MG/DL — HIGH (ref 70–99)
GLUCOSE BLDC GLUCOMTR-MCNC: 178 MG/DL — HIGH (ref 70–99)
GLUCOSE SERPL-MCNC: 114 MG/DL — HIGH (ref 70–99)
HCT VFR BLD CALC: 28.1 % — LOW (ref 34.5–45)
HGB BLD-MCNC: 9.3 G/DL — LOW (ref 11.5–15.5)
INR BLD: 1.07 RATIO — SIGNIFICANT CHANGE UP (ref 0.85–1.18)
MAGNESIUM SERPL-MCNC: 2 MG/DL — SIGNIFICANT CHANGE UP (ref 1.6–2.6)
MCHC RBC-ENTMCNC: 30.9 PG — SIGNIFICANT CHANGE UP (ref 27–34)
MCHC RBC-ENTMCNC: 33.1 GM/DL — SIGNIFICANT CHANGE UP (ref 32–36)
MCV RBC AUTO: 93.4 FL — SIGNIFICANT CHANGE UP (ref 80–100)
METHOD TYPE: SIGNIFICANT CHANGE UP
METHOD TYPE: SIGNIFICANT CHANGE UP
NRBC # BLD: 0 /100 WBCS — SIGNIFICANT CHANGE UP (ref 0–0)
NRBC # FLD: 0 K/UL — SIGNIFICANT CHANGE UP (ref 0–0)
ORGANISM # SPEC MICROSCOPIC CNT: ABNORMAL
PHOSPHATE SERPL-MCNC: 3 MG/DL — SIGNIFICANT CHANGE UP (ref 2.5–4.5)
PLATELET # BLD AUTO: 309 K/UL — SIGNIFICANT CHANGE UP (ref 150–400)
POTASSIUM SERPL-MCNC: 4.3 MMOL/L — SIGNIFICANT CHANGE UP (ref 3.5–5.3)
POTASSIUM SERPL-SCNC: 4.3 MMOL/L — SIGNIFICANT CHANGE UP (ref 3.5–5.3)
PROTHROM AB SERPL-ACNC: 12.1 SEC — SIGNIFICANT CHANGE UP (ref 9.5–13)
RBC # BLD: 3.01 M/UL — LOW (ref 3.8–5.2)
RBC # FLD: 13.4 % — SIGNIFICANT CHANGE UP (ref 10.3–14.5)
SODIUM SERPL-SCNC: 132 MMOL/L — LOW (ref 135–145)
SPECIMEN SOURCE: SIGNIFICANT CHANGE UP
WBC # BLD: 11.16 K/UL — HIGH (ref 3.8–10.5)
WBC # FLD AUTO: 11.16 K/UL — HIGH (ref 3.8–10.5)

## 2024-05-30 PROCEDURE — 99233 SBSQ HOSP IP/OBS HIGH 50: CPT

## 2024-05-30 PROCEDURE — 93970 EXTREMITY STUDY: CPT | Mod: 26

## 2024-05-30 PROCEDURE — 99223 1ST HOSP IP/OBS HIGH 75: CPT

## 2024-05-30 RX ORDER — HEPARIN SODIUM 5000 [USP'U]/ML
4500 INJECTION INTRAVENOUS; SUBCUTANEOUS EVERY 6 HOURS
Refills: 0 | Status: DISCONTINUED | OUTPATIENT
Start: 2024-05-30 | End: 2024-05-31

## 2024-05-30 RX ORDER — SODIUM CHLORIDE 9 MG/ML
1000 INJECTION INTRAMUSCULAR; INTRAVENOUS; SUBCUTANEOUS
Refills: 0 | Status: DISCONTINUED | OUTPATIENT
Start: 2024-05-30 | End: 2024-05-31

## 2024-05-30 RX ORDER — ERTAPENEM SODIUM 1 G/1
500 INJECTION, POWDER, LYOPHILIZED, FOR SOLUTION INTRAMUSCULAR; INTRAVENOUS EVERY 24 HOURS
Refills: 0 | Status: COMPLETED | OUTPATIENT
Start: 2024-05-30 | End: 2024-06-01

## 2024-05-30 RX ORDER — ERTAPENEM SODIUM 1 G/1
500 INJECTION, POWDER, LYOPHILIZED, FOR SOLUTION INTRAMUSCULAR; INTRAVENOUS EVERY 24 HOURS
Refills: 0 | Status: DISCONTINUED | OUTPATIENT
Start: 2024-05-30 | End: 2024-05-30

## 2024-05-30 RX ORDER — HEPARIN SODIUM 5000 [USP'U]/ML
2000 INJECTION INTRAVENOUS; SUBCUTANEOUS EVERY 6 HOURS
Refills: 0 | Status: DISCONTINUED | OUTPATIENT
Start: 2024-05-30 | End: 2024-05-31

## 2024-05-30 RX ORDER — HEPARIN SODIUM 5000 [USP'U]/ML
INJECTION INTRAVENOUS; SUBCUTANEOUS
Qty: 25000 | Refills: 0 | Status: DISCONTINUED | OUTPATIENT
Start: 2024-05-30 | End: 2024-05-31

## 2024-05-30 RX ADMIN — Medication 650 MILLIGRAM(S): at 22:18

## 2024-05-30 RX ADMIN — HEPARIN SODIUM 5000 UNIT(S): 5000 INJECTION INTRAVENOUS; SUBCUTANEOUS at 05:57

## 2024-05-30 RX ADMIN — Medication 3 MILLIGRAM(S): at 21:19

## 2024-05-30 RX ADMIN — HEPARIN SODIUM 5000 UNIT(S): 5000 INJECTION INTRAVENOUS; SUBCUTANEOUS at 13:10

## 2024-05-30 RX ADMIN — LIDOCAINE 1 PATCH: 4 CREAM TOPICAL at 23:11

## 2024-05-30 RX ADMIN — LIDOCAINE 1 PATCH: 4 CREAM TOPICAL at 11:18

## 2024-05-30 RX ADMIN — Medication 650 MILLIGRAM(S): at 21:18

## 2024-05-30 RX ADMIN — SODIUM CHLORIDE 75 MILLILITER(S): 9 INJECTION INTRAMUSCULAR; INTRAVENOUS; SUBCUTANEOUS at 17:25

## 2024-05-30 RX ADMIN — ERTAPENEM SODIUM 100 MILLIGRAM(S): 1 INJECTION, POWDER, LYOPHILIZED, FOR SOLUTION INTRAMUSCULAR; INTRAVENOUS at 13:12

## 2024-05-30 RX ADMIN — HEPARIN SODIUM 1100 UNIT(S)/HR: 5000 INJECTION INTRAVENOUS; SUBCUTANEOUS at 23:18

## 2024-05-30 RX ADMIN — LIDOCAINE 1 PATCH: 4 CREAM TOPICAL at 19:47

## 2024-05-30 NOTE — PROGRESS NOTE ADULT - SUBJECTIVE AND OBJECTIVE BOX
[facility] Department of Hospital Medicine  Brandi Wei DO  Available on MS Teams  Pager: [phone]    Patient is a 79y old  Female who presents with a chief complaint of Dyspnea     (29 May 2024 10:44)    Subjective:  Pt seen and examined at bedside resting comfortably, on the phone. In good spirits. Feels well overall. Discussed uptrending d-dimer and need for hematology eval - in agreement. Also discussed UCx results and +ESBL with need for different abx. Agreeable to outpt f/u with rad-onc. Also with ongoing urinary retention, will require ayala. Appreciative of updates. Daughter to visit around 4pm.    Vital Signs Last 24 Hrs  T(C): 36.8 (30 May 2024 12:58), Max: 37.1 (29 May 2024 20:34)  T(F): 98.3 (30 May 2024 12:58), Max: 98.8 (29 May 2024 20:34)  HR: 97 (30 May 2024 12:58) (84 - 97)  BP: 123/58 (30 May 2024 12:58) (113/59 - 147/84)  BP(mean): --  RR: 18 (30 May 2024 12:58) (15 - 18)  SpO2: 97% (30 May 2024 12:58) (95% - 98%)    Parameters below as of 30 May 2024 12:58  Patient On (Oxygen Delivery Method): room air    PHYSICAL EXAM:  Constitutional: resting comfortably in bed; NAD  Head: NC/AT  Eyes: PERRL, EOMI, anicteric sclera  ENT: no nasal discharge; MMM  Neck: supple; no JVD  Respiratory: CTA B/L; no W/R/R  Cardiac: +S1/S2; RRR; no M/R/G  Gastrointestinal: soft, NT/ND; no rebound or guarding; +BSx4  Extremities: WWP, no clubbing or cyanosis; RLE > LLE 1+ edema  Musculoskeletal: NROM x4; no joint swelling, tenderness or erythema; mm strength 5/5 throughout all extremities; sensation intact and equal through all extremities  Vascular: 2+ radial, DP/PT pulses B/L  Dermatologic: skin warm, dry and intact; no rashes, wounds, or scars  Neurologic: AAOx3; CNII-XII grossly intact; no focal deficits  Psychiatric: affect and characteristics of appearance, verbalizations, behaviors are appropriate    MEDICATIONS  (STANDING):  ertapenem  IVPB 500 milliGRAM(s) IV Intermittent every 24 hours  heparin   Injectable 5000 Unit(s) SubCutaneous every 8 hours  lidocaine   4% Patch 1 Patch Transdermal daily  melatonin 3 milliGRAM(s) Oral at bedtime    MEDICATIONS  (PRN):  acetaminophen     Tablet .. 650 milliGRAM(s) Oral every 6 hours PRN Temp greater or equal to 38C (100.4F), Mild Pain (1 - 3)    LABS:                        9.3    11.16 )-----------( 309      ( 30 May 2024 05:20 )             28.1     05-30    132<L>  |  98  |  28<H>  ----------------------------<  114<H>  4.3   |  20<L>  |  1.34<H>    Ca    9.4      30 May 2024 05:20  Phos  3.0     05-30  Mg     2.00     05-30        Urinalysis Basic - ( 30 May 2024 05:20 )    Color: x / Appearance: x / SG: x / pH: x  Gluc: 114 mg/dL / Ketone: x  / Bili: x / Urobili: x   Blood: x / Protein: x / Nitrite: x   Leuk Esterase: x / RBC: x / WBC x   Sq Epi: x / Non Sq Epi: x / Bacteria: x      CAPILLARY BLOOD GLUCOSE      POCT Blood Glucose.: 178 mg/dL (30 May 2024 11:47)      RADIOLOGY & ADDITIONAL TESTS: Reviewed.

## 2024-05-30 NOTE — PROGRESS NOTE ADULT - SUBJECTIVE AND OBJECTIVE BOX
[facility] Geriatrics and Palliative Care  Juanita Zuniga MD, Palliative Care Attending  Contact Info: Page [phone] (including Nights/Weekends), message on Microsoft Teams (Juanita Zuniga MD), or leave VM at Palliative Office [phone] (non-urgent)   Date of [id number]     SUBJECTIVE AND OBJECTIVE:  Patient was seen in the afternoon around 430 pm with daughter Oma and friend   Patient endorses intermittent pain of the RLE; otherwise no sob, tolerating diet      Indication for Geriatrics and Palliative Care Services/INTERVAL HPI: goals of care     INTERVAL EVENTS:  no acute events    DNR on chart:  Allergies    No Known Allergies    Intolerances    MEDICATIONS  (STANDING):  ertapenem  IVPB 500 milliGRAM(s) IV Intermittent every 24 hours  heparin  Infusion.  Unit(s)/Hr (11 mL/Hr) IV Continuous <Continuous>  lidocaine   4% Patch 1 Patch Transdermal daily  melatonin 3 milliGRAM(s) Oral at bedtime  sodium chloride 0.9%. 1000 milliLiter(s) (75 mL/Hr) IV Continuous <Continuous>    MEDICATIONS  (PRN):  acetaminophen     Tablet .. 650 milliGRAM(s) Oral every 6 hours PRN Temp greater or equal to 38C (100.4F), Mild Pain (1 - 3)  heparin   Injectable 4500 Unit(s) IV Push every 6 hours PRN For aPTT less than 40  heparin   Injectable 2000 Unit(s) IV Push every 6 hours PRN For aPTT between 40 - 57      ITEMS UNCHECKED ARE NOT PRESENT    PRESENT SYMPTOMS: [ ]Unable to self-report - see [ ] CPOT [ ] PAINADS [ ] RDOS  Source if other than patient:  [ ]Family   [ ]Team     Pain:  [x ]yes [ ]no  QOL impact - mild to moderate  Location -                  RLE   Aggravating factors - ambulation   Quality - sharp  Radiation - R knee to feet  Timing- intermittent   Severity (0-10 scale): 6  Minimal acceptable level/ pain goal (0-10 scale):     CPOT:    https://www.sccm.org/getattachment/lwr99r00-9b1r-0t8k-5n0n-6746w7491k4g/Critical-Care-Pain-Observation-Tool-(CPOT)    Dyspnea:                           [ ]Mild [ ]Moderate [ ]Severe  Anxiety:                             [ ]Mild [ ]Moderate [ ]Severe  Fatigue:                             [x ]Mild [ ]Moderate [ ]Severe  Nausea:                             [ ]Mild [ ]Moderate [ ]Severe  Loss of appetite:              [ ]Mild [ ]Moderate [ ]Severe  Constipation:                    [ ]Mild [ ]Moderate [ ]Severe  Other Symptoms:  [x ]All other review of systems negative     PCSSQ[Palliative Care Spiritual Screening Question]   Severity (0-10):  Score of 4 or > indicate consideration of Chaplaincy referral.  Chaplaincy Referral: [ ] yes [ ] refused [ ] following [ ] deferred    Caregiver [place]? : [ ] yes [ ] no [ ] Deferred [ ] Declined             Social work referral [ ] Patient & Family Centered Care Referral [ ]  Anticipatory Grief present?:  [ ] yes [ ] no  [ ] Deferred                  Social work referral [ ] Patient & Family Centered Care Referral [ ]      PHYSICAL EXAM:  Vital Signs Last 24 Hrs  T(C): 37.1 (30 May 2024 21:26), Max: 37.6 (30 May 2024 17:33)  T(F): 98.8 (30 May 2024 21:26), Max: 99.6 (30 May 2024 17:33)  HR: 93 (30 May 2024 21:26) (91 - 103)  BP: 139/65 (30 May 2024 21:26) (123/58 - 147/84)  BP(mean): --  RR: 18 (30 May 2024 21:26) (16 - 18)  SpO2: 97% (30 May 2024 21:26) (97% - 98%)    Parameters below as of 30 May 2024 21:26  Patient On (Oxygen Delivery Method): room air     I&O's Summary    29 May 2024 07:01  -  30 May 2024 07:00  --------------------------------------------------------  IN: 0 mL / OUT: 600 mL / NET: -600 mL         General: well nourished, alert, NAD  HENT: normocephalic, face relaxed  Eyes: no icterus, conjunctiva clear  Chest: symmetric excursion, no accessory muscle use  Heart: peripheral pulse 2+ and regular  Lungs: no dyspnea with speech  Abdomen: soft, NT, ND  Ext: no edema  Neuro: alert, coherent speech, grossly non-focal  Psych: calm, rational, linear thought process  Skin: ]Normal  [ ]Rash  [ ]Other  [ ]Pressure ulcer(s) [ ]y [ ]n present on admission    CRITICAL CARE:  [ ]Shock Present  [ ]Septic [ ]Cardiogenic [ ]Neurologic [ ]Hypovolemic  [ ]Vasopressors [ ]Inotropes  [ ]Respiratory failure present [ ]Mechanical Ventilation [ ]Non-invasive ventilatory support [ ]High-Flow   [ ]Acute  [ ]Chronic [ ]Hypoxic  [ ]Hypercarbic [ ]Other  [ ]Other organ failure     LABS:                        9.3    11.16 )-----------( 309      ( 30 May 2024 05:20 )             28.1   05-30    132<L>  |  98  |  28<H>  ----------------------------<  114<H>  4.3   |  20<L>  |  1.34<H>    Ca    9.4      30 May 2024 05:20  Phos  3.0     05-30  Mg     2.00     05-30    PT/INR - ( 30 May 2024 18:02 )   PT: 12.1 sec;   INR: 1.07 ratio         PTT - ( 30 May 2024 18:02 )  PTT:26.6 sec    Urinalysis Basic - ( 30 May 2024 05:20 )    Color: x / Appearance: x / SG: x / pH: x  Gluc: 114 mg/dL / Ketone: x  / Bili: x / Urobili: x   Blood: x / Protein: x / Nitrite: x   Leuk Esterase: x / RBC: x / WBC x   Sq Epi: x / Non Sq Epi: x / Bacteria: x      RADIOLOGY & ADDITIONAL STUDIES:  < from: NM Pulmonary Ventilation/Perfusion Scan (05.28.24 @ 15:51) >  IMPRESSION: Very low probability of pulmonary embolus.    < end of copied text >    Protein Calorie Malnutrition Present: [ ]mild [ ]moderate [ ]severe [ ]underweight [ ]morbid obesity  https://www.andeal.org/vault/7910/web/files/ONC/Table_Clinical%20Characteristics%20to%20Document%20Malnutrition-White%20JV%20et%20al%202012.pdf    Height (cm): 154.9 (05-27-24 @ 18:41), 154.9 (01-05-24 @ 11:51), 157 (12-15-23 @ 09:11)  Weight (kg): 57 (05-27-24 @ 18:41), 60.9 (01-05-24 @ 11:51), 61.2 (12-15-23 @ 09:11)  BMI (kg/m2): 23.8 (05-27-24 @ 18:41), 25.4 (01-05-24 @ 11:51), 24.8 (12-15-23 @ 09:11)    [ ]PPSV2 < or = 30%  [ ]significant weight loss [ ]poor nutritional intake [ ]anasarca[ ]Artificial Nutrition    Other REFERRALS:  [ ]Hospice  [ ]Child Life  [ ]Social Work  [ ]Case management [ ]Holistic Therapy     Goals of Care Document:

## 2024-05-30 NOTE — CHART NOTE - NSCHARTNOTEFT_GEN_A_CORE
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    Found to have elevated d-dimer and IRENE on CKD, now improving. D-dimer continues to rise, for which hematology is now consulted. Febrile on arrival with UA+ s/p CTX, however UCx with ESBL E coli/proteus, now on ertapenem. LE dopplers neg, VQ scan neg, US KUB with mod R hydro and bladder distention, and TTE neg. Palliative and rad-onc following.     Seen by rad onc Dr. Ling prior and has a f/u visit on June 7th 2024 10am  to consider pelvic palliative RT.  Also spoke with Oma, daughter; and both are  "considering RT" and want to hear what Dr. Ling says on consult first.     There is no role for inpatient RT at [facility] now.  Pending soon discharge after antibiotics.           < from: MR Pelvis w/wo IV Cont (01.30.24 @ 21:07) >    IMPRESSION:  1.  Complex solid and cystic pelvic lesion demonstrating interval   increase in size due to interval increase of fluid component. The lesion   is contiguous with the vaginal cuff where circumferential rind of tissue   is noted with diffusion restriction is noted. Findings are suspicious for   gynecological neoplasm, such as cervical or cystic ovarian malignancy.  2.  Multiple enlarged centrally necrotic pelvic lymphadenopathy,   suspicious for metastatic disease.  3.  Stable severe right hydronephrosis secondary to the mass effect from   previously mentioned pelvic lesion.  4.  Findings were discussed with Dr. MICHAEL BETTS 2/7/2024 10:02 AM by Dr. Singletary with read back confirmation.
Doppler LE neg DVT. V/Q scan low probability of PE. Spoke with attending, will DC heparin gtt and start heparin PPX. Will trend D-dimer tomorrow    ACP  [id number]
I have personally reviewed this patient's ISTOP, reference #[id number]    A	N	O	05/13/2024	05/19/2024	acetaminophen-cod #2 tablet	12	4	Otoniel Liu MD	[id number]	Medicare	[id number]  A	N	O	05/10/2024	05/10/2024	hydrocodone-acetaminophen 5-300 mg tablet	20	10	David Pavon MD	[id number]	[id number]
Pt seen and examined at bedside sleeping comfortably, no family at bedside. Attempted to call daughter but no answer. Pt easily arousable but requested to be spoken to later as she was tired. Please see H&P from earlier today for additional information. In addition, pt with improving Cr and LFTs. US KUB with mod R hydronephrosis and bladder distention. Will check PVR and BS q8h. UA+ with UCx pending, started on IV CTX with likely 3d treatment plan. Palliative consulted, family continues to think about RT but is unsure if want to pursue as her symptoms are limited related to malignancy. VQ scan low probability of PE, will transition hep gtt to ppx dosing. Will consult rad-onc in AM for treatment options in case they would like to pursue tx. PT consulted with recs pending.

## 2024-05-30 NOTE — CONSULT NOTE ADULT - ASSESSMENT
79F with PMHx CKD (2/2 R hydronephrosis from obstructing pelvic mass), Chronic RLE edema since 9/2023, HTN, HLD, goiter, pelvic mass with mets initially dx 7/2023 with LN bx 11/2023 with SCC vs urothelial carcinoma (no treatment, pending patient/family decision on RT) presenting with chest pain 5/27 AM.  Hematology called for highly elevated D dimer 24836.  VQ scan did not demonstrate a VQ mismatch. 5/28/24 Duplex did not show a DVT. Right leg edema  DD-dimer is probably elevated as an acute phase reactant secondary to the untreated Urothelial cancer in the pelvis.  Elevation is non specific, though newer Duplex US LE from 8 PM 5/30 right now does show an acute thrombosis in the right femoral vein.  These will both raise the risk of Venous thrombosis.      Hypercoagulable state not indicated, 79 year old woman with multiple risk factors, active large malignancy, decreased functional status.  Recommend anticoagulation on Eliquis with the typical 10mg PO BID X 1 week loading dose followed by 5mg PO BID for long term given the cancer is still going to be an active problem.    Family has not yet decided on pursuing treatment for the cancer. Recommend consultation with palliative care for further goals of care.  If at this point months later, family has not decided on seeking treatment, she is a good hospice candidate.   79F with PMHx CKD (2/2 R hydronephrosis from obstructing pelvic mass), Chronic RLE edema since 9/2023, HTN, HLD, goiter, pelvic mass with mets initially dx 7/2023 with LN bx 11/2023 with SCC vs urothelial carcinoma (no treatment, pending patient/family decision on RT) presenting with chest pain 5/27 AM.  Hematology called for highly elevated D dimer 61825.  VQ scan did not demonstrate a VQ mismatch. 5/28/24 Duplex did not show a DVT. D-dimer is probably elevated as an acute phase reactant secondary to the untreated Urothelial cancer in the pelvis.  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  These will both raise the risk of Venous thrombosis.      Hypercoagulable state not indicated, 79 year old woman with multiple risk factors, active large malignancy, decreased functional status.  Recommend anticoagulation on Eliquis with the typical 10mg PO BID X 1 week loading dose followed by 5mg PO BID for long term given the cancer is still going to be an active problem.    Family has not yet decided on pursuing treatment for the cancer. Palliative care consulted for further goals of care, will f/u input/recs.  If at this point months later, family has not decided on seeking treatment, she is a good hospice candidate.      Rest of care as per primary team.  Thank you for the consult and hematology to sign off at this time. Please feel free to reconsult as needed.

## 2024-05-30 NOTE — CONSULT NOTE ADULT - SUBJECTIVE AND OBJECTIVE BOX
CC: Patient is a 79y old  Female who presents with a chief complaint of chest pain (30 May 2024 14:26)    HPI:  79F with PMHx CKD (2/2 R hydronephrosis from obstructing pelvic mass), Chronic RLE edema since 9/2023, HTN, HLD, goiter, pelvic mass with mets initially dx 7/2023 with LN bx 11/2023 with SCC vs urothelial carcinoma (no treatment, pending patient/family decision on RT) presenting with chest pain 5/27 AM. The patient was in USOH until 6AM when she suddenly felt substernal needle-like chest pain. This lasted only a few minutes and went away on its own. There was no radiation or exertional component. She denies SOB, LH, dizziness. Of note yesterday she felt some heart fluttering. Per daughter she felt as though she may have a temperature but this was not confirmed at [facility] [place]. Patient denies fevers, chills, cough, abdominal pain, vomiting, diarrhea, dysuria, hematuria. Per daughter she has dark concentrated urine and some odor. Over the last week her RLE pain has gotten worse limiting her ambulation. She denies any weakness, numbness, tingling, saddle anesthesia, back pain, trauma. She cannot make it to the bathroom in time due to being in the bed and urinates on herself. Now she uses a diaper. Otherwise denies loss of sensation when urinating. Denies fecal incontinence. Of note patient is following with gyn onc and rad onc regarding her pelvic mass. She is deciding on if she wants RT per notes.       Onc hx as per gyn onc note 2/21/2024 Dr. Gary Goldberg  - Pelvic mass was initially discovered in July when patient presented to the ED s/p fall.  At this time, patient was noted to have an IRENE and unable to void.  - Pt referred to Urology - their workup was concerning for 12cm diverticulum and non-functioning left kidney.  During this time period patient noted swelling of her RLE.  For this swelling patient was referred to the ED where she was found to have 17cm pelvic mass with pelvic and para-aortic lymph nodes.  - s/p Cysto, R ureteroscopy, retrograde pyelogram , vaginoscopy (1/5/24): No evidence of bladder diverticulum, and no connection to mass visualized in collecting system. Patient discussed at  tumor board, and believed to be gynecologic in origin.  - s/p IR biopsy pelvic lymph node: SCC vs urothelial carcinoma  -Pelvic MR(1/30/24): S/p hysterectomy per history. Complex solid and cystic structure 19cm w/ rind of soft tissue measuring up to 1.7cm in thickness w/ enhancement and diffusion restriction abutting/contiguous with vaginal cuff. Previously measuring 17cm. Multiple enlarged centrally necrotic pelvic LAD. Stable severe R hydronephrosis 2/2 mass effect.  ?  - Pt discussed at GYN ONC Tumor Board on 1/17/24. 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 Plan for pelvic RT, possible cis/RT pending kidney function/tolerability.    Per rad onc note Dr. Estrella Ling 2/22/24:  Plan for pelvic RT, possible cis/RT pending kidney function/tolerability. Disease felt not to be curative per rad onc note.    I discussed with patient and her daughter and they are still deciding on whether or not they want radiation.   ?    In ED, patient started on heparin gtt due to elevated D-dimer and cannot get CTA chest with contrast because of poor kidney function. Patient denies any hematochezia, melena, hematemesis or any other sources of bleeding. (28 May 2024 02:12)    PAST MEDICAL & SURGICAL HISTORY:  Essential hypertension      Hyperlipidemia      Thyroid goiter      T2DM (type 2 diabetes mellitus)      CKD (chronic kidney disease)      Edema of right lower leg      Pelvic mass      H/O: hysterectomy      H/O lumpectomy      Bladder diverticulum      S/P cystoscopy        SOCIAL HISTORY:  Tobacco Usage:  (   ) never smoked   (   ) former smoker   (   ) current smoker  (     ) pack years    Tobacco Quit Date:  Substance Use (Street drugs): (  ) never used  (  ) other:  Alcohol Usage:    Family history reviewed and otherwise non-contributory  ALLERGIES:  No Known Allergies    MEDICATIONS:  acetaminophen     Tablet .. 650 milliGRAM(s) Oral every 6 hours PRN  ertapenem  IVPB 500 milliGRAM(s) IV Intermittent every 24 hours  heparin   Injectable 5000 Unit(s) SubCutaneous every 8 hours  lidocaine   4% Patch 1 Patch Transdermal daily  melatonin 3 milliGRAM(s) Oral at bedtime      REVIEW OF SYSTEMS:  Negative except as above in HPI.    Vital Signs Last 24 Hrs  T(F): 98.3 (30 May 2024 12:58), Max: 98.8 (29 May 2024 20:34)  HR: 97 (30 May 2024 12:58) (84 - 97)  BP: 123/58 (30 May 2024 12:58) (113/59 - 147/84)  RR: 18 (30 May 2024 12:58) (15 - 18)  SpO2: 97% (30 May 2024 12:58) (95% - 98%)  I&O's Summary    29 May 2024 07:01  -  30 May 2024 07:00  --------------------------------------------------------  IN: 0 mL / OUT: 600 mL / NET: -600 mL      PHYSICAL EXAM:  GENERAL: NAD, well-groomed  HEAD:  Atraumatic, Normocephalic  EYES: PERRLA, normal conjunctiva, anicteric  ENMT: Moist mucous membranes, no mucositis  NECK: Supple, No JVD  CHEST/LUNG: Clear to auscultation bilaterally; No rales, rhonchi, wheezing, or rubs  HEART: Regular rate and rhythm; S1/S2, No murmurs, rubs, or gallops  ABDOMEN: Soft, Nontender, Nondistended; Bowel sounds present  VASCULAR: Normal pulses, Normal capillary refill  EXTREMITIES:  2+ Peripheral Pulses, No cyanosis, No edema  LYMPH: No lymphadenopathy noted  SKIN: Warm, Intact  PSYCH: Normal mood and affect  NERVOUS SYSTEM:  A/O x3, CN 2-12 intact, No focal deficits    LABS:                        9.3    11.16 )-----------( 309      ( 30 May 2024 05:20 )             28.1     05-30    132  |  98  |  28  ----------------------------<  114  4.3   |  20  |  1.34    Ca    9.4      30 May 2024 05:20  Phos  3.0     05-30  Mg     2.00     05-30    TPro  x   /  Alb  x   /  TBili  x   /  DBili  x   /  AST  71  /  ALT  53  /  AlkPhos  x   05-28      PT/INR - ( 27 May 2024 21:10 )   PT: 13.1 sec;   INR: 1.18 ratio         PTT - ( 28 May 2024 12:28 )  PTT:45.0 sec    CARDIAC MARKERS ( 28 May 2024 00:49 )  x     / x     / 67 U/L / x     / 1.0 ng/mL      TSH 0.64   TSH with FT4 reflex --  Total T3 --      23:30 - VBG - pH: 7.36  | pCO2: 38    | pO2: 40    | Lactate: 1.2            POCT Blood Glucose.: 178 mg/dL (30 May 2024 11:47)  POCT Blood Glucose.: 127 mg/dL (30 May 2024 08:48)  POCT Blood Glucose.: 141 mg/dL (29 May 2024 21:15)  POCT Blood Glucose.: 108 mg/dL (29 May 2024 17:39)      Urinalysis Basic - ( 30 May 2024 05:20 )    Color: x / Appearance: x / SG: x / pH: x  Gluc: 114 mg/dL / Ketone: x  / Bili: x / Urobili: x   Blood: x / Protein: x / Nitrite: x   Leuk Esterase: x / RBC: x / WBC x   Sq Epi: x / Non Sq Epi: x / Bacteria: x        Culture - Urine (collected 28 May 2024 18:27)  Source: Clean Catch Clean Catch (Midstream)  Final Report (30 May 2024 11:48):    10,000 - 49,000 CFU/mL Escherichia coli ESBL    10,000 - 49,000 CFU/mL Proteus mirabilis  Organism: Escherichia coli ESBL  Proteus mirabilis (30 May 2024 11:48)  Organism: Proteus mirabilis (30 May 2024 11:48)      Method Type: JODY      -  Amoxicillin/Clavulanic Acid: S <=8/4      -  Ampicillin: S <=8 These ampicillin results predict results for amoxicillin      -  Ampicillin/Sulbactam: S <=4/2      -  Aztreonam: S <=4      -  Cefazolin: S <=2 For uncomplicated UTI with K. pneumoniae, E. coli, or P. mirablis: JODY <=16 is sensitive and JODY >=32 is resistant. This also predicts results for oral agents cefaclor, cefdinir, cefpodoxime, cefprozil, cefuroxime axetil, cephalexin and locarbef for uncomplicated UTI. Note that some isolates may be susceptible to these agents while testing resistant to cefazolin.      -  Cefepime: S <=2      -  Cefoxitin: S <=8      -  Ceftriaxone: S <=1      -  Cefuroxime: S <=4      -  Ciprofloxacin: S <=0.25      -  Ertapenem: S <=0.5      -  Gentamicin: S <=2      -  Levofloxacin: S <=0.5      -  Meropenem: S <=1      -  Nitrofurantoin: R >64 Should not be used to treat pyelonephritis      -  Piperacillin/Tazobactam: S <=8      -  Tobramycin: S <=2      -  Trimethoprim/Sulfamethoxazole: S <=0.5/9.5  Organism: Escherichia coli ESBL (30 May 2024 11:48)      Method Type: JODY      -  Ampicillin: R >16 These ampicillin results predict results for amoxicillin      -  Ampicillin/Sulbactam: I 16/8      -  Aztreonam: R >16      -  Cefazolin: R >16 For uncomplicated UTI with K. pneumoniae, E. coli, or P. mirablis: JODY <=16 is sensitive and JODY >=32 is resistant. This also predicts results for oral agents cefaclor, cefdinir, cefpodoxime, cefprozil, cefuroxime axetil, cephalexin and locarbef for uncomplicated UTI. Note that some isolates may be susceptible to these agents while testing resistant to cefazolin.      -  Cefepime: R >16      -  Ceftriaxone: R >32      -  Cefuroxime: R >16      -  Ciprofloxacin: R >2      -  Ertapenem: S <=0.5      -  Gentamicin: S <=2      -  Imipenem: S <=1      -  Levofloxacin: R >4      -  Meropenem: S <=1      -  Nitrofurantoin: S <=32 Should not be used to treat pyelonephritis      -  Piperacillin/Tazobactam: S <=8      -  Tobramycin: R >8      -  Trimethoprim/Sulfamethoxazole: S <=0.5/9.5    Culture - Blood (collected 27 May 2024 23:35)  Source: .Blood Blood-Venous  Preliminary Report (30 May 2024 06:01):    No growth at 48 Hours    Culture - Blood (collected 27 May 2024 23:00)  Source: .Blood Blood-Peripheral  Preliminary Report (30 May 2024 06:01):    No growth at 48 Hours          RADIOLOGY & ADDITIONAL TESTS:    Care Discussed with Consultants/Other Providers: CC: Patient is a 79y old  Female who presents with a chief complaint of chest pain (30 May 2024 14:26)    HPI:  79F with PMHx CKD (2/2 R hydronephrosis from obstructing pelvic mass), Chronic RLE edema since 9/2023, HTN, HLD, goiter, pelvic mass with mets initially dx 7/2023 with LN bx 11/2023 with SCC vs urothelial carcinoma (no treatment, pending patient/family decision on RT) presenting with chest pain 5/27 AM. The patient was in USOH until 6AM when she suddenly felt substernal needle-like chest pain. This lasted only a few minutes and went away on its own. There was no radiation or exertional component. She denies SOB, LH, dizziness. Of note yesterday she felt some heart fluttering. Per daughter she felt as though she may have a temperature but this was not confirmed at [facility] [place] this week. Patient denies fevers, chills, cough, abdominal pain, vomiting, diarrhea, dysuria, hematuria. Per daughter she has dark concentrated urine and some odor. Over the last week her RLE pain has gotten worse limiting her ambulation. She denies any weakness, numbness, tingling, saddle anesthesia, back pain, trauma. She cannot make it to the bathroom in time due to being in the bed and urinates on herself. Now she uses a diaper. Otherwise denies loss of sensation when urinating. Denies fecal incontinence. Of note patient is following with gyn onc and rad onc regarding her pelvic mass. She is deciding on if she wants RT per notes.     Onc hx as per gyn onc note 2/21/2024 Dr. Gary Goldberg  - Pelvic mass was initially discovered in July when patient presented to the ED s/p fall.  At this time, patient was noted to have an IRENE and unable to void.  - Pt referred to Urology - their workup was concerning for 12cm diverticulum and non-functioning left kidney.  During this time period patient noted swelling of her RLE.  For this swelling patient was referred to the ED where she was found to have 17cm pelvic mass with pelvic and para-aortic lymph nodes.  - s/p Cysto, R ureteroscopy, retrograde pyelogram , vaginoscopy (1/5/24): No evidence of bladder diverticulum, and no connection to mass visualized in collecting system. Patient discussed at  tumor board, and believed to be gynecologic in origin.  - s/p IR biopsy pelvic lymph node: SCC vs urothelial carcinoma  -Pelvic MR(1/30/24): S/p hysterectomy per history. Complex solid and cystic structure 19cm w/ rind of soft tissue measuring up to 1.7cm in thickness w/ enhancement and diffusion restriction abutting/contiguous with vaginal cuff. Previously measuring 17cm. Multiple enlarged centrally necrotic pelvic LAD. Stable severe R hydronephrosis 2/2 mass effect.  - Pt discussed at GYN ONC Tumor Board on 1/17/24. 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 Plan for pelvic RT, possible cis/RT pending kidney function/tolerability.    Per rad onc note Dr. Estrella Ling 2/22/24:  Plan for pelvic RT, possible cis/RT pending kidney function/tolerability. Disease felt not to be curative per rad onc note.    Patient and her daughter and they are still deciding on whether or not they want radiation.     In ED, patient started on heparin gtt due to elevated D-dimer. Could not get CTA chest with contrast because of poor kidney function. Patient denies any hematochezia, melena, hematemesis or any other sources of bleeding.     Hematology consulted due to markedly elevated D-dimer and unclear source at the time. Denies previous blood clots. FHx+ niece with blood clot (unclear where) and diagnosed in 50s (unknown etiology). Denies recent long trips/flights, surgeries. Denies recurrent miscarriages, has 5 children. Denies CP/SOB. Had previous age-appropriate screening: colonoscopy, mammogram.    PAST MEDICAL & SURGICAL HISTORY:  Essential hypertension  Hyperlipidemia  Thyroid goiter  T2DM (type 2 diabetes mellitus)  CKD (chronic kidney disease)  Edema of right lower leg  Pelvic mass  H/O: hysterectomy  H/O lumpectomy  Bladder diverticulum  S/P cystoscopy    SOCIAL HISTORY:  Denies smoking, alcohol or illicit drug use    Family history reviewed and otherwise non-contributory except as above in HPI    ALLERGIES:  No Known Allergies    MEDICATIONS:  acetaminophen     Tablet .. 650 milliGRAM(s) Oral every 6 hours PRN  ertapenem  IVPB 500 milliGRAM(s) IV Intermittent every 24 hours  heparin   Injectable 5000 Unit(s) SubCutaneous every 8 hours  lidocaine   4% Patch 1 Patch Transdermal daily  melatonin 3 milliGRAM(s) Oral at bedtime    REVIEW OF SYSTEMS:  Negative except as above in HPI.    Vital Signs Last 24 Hrs  T(F): 98.3 (30 May 2024 12:58), Max: 98.8 (29 May 2024 20:34)  HR: 97 (30 May 2024 12:58) (84 - 97)  BP: 123/58 (30 May 2024 12:58) (113/59 - 147/84)  RR: 18 (30 May 2024 12:58) (15 - 18)  SpO2: 97% (30 May 2024 12:58) (95% - 98%)  I&O's Summary    29 May 2024 07:01  -  30 May 2024 07:00  --------------------------------------------------------  IN: 0 mL / OUT: 600 mL / NET: -600 mL    PHYSICAL EXAM:  GENERAL: NAD, well-groomed  HEAD:  Atraumatic, Normocephalic  EYES: normal conjunctiva, anicteric  ENMT: Moist mucous membranes  NECK: Supple, No JVD  CHEST/LUNG: Clear to auscultation bilaterally; no increased work of breathing  HEART: Regular rate and rhythm; S1/S2, No murmurs  ABDOMEN: Soft, Nontender, Nondistended; Bowel sounds present  EXTREMITIES: R leg swelling compared to the   LYMPH: No palpable lymphadenopathy noted  SKIN: Warm, Intact  PSYCH: Normal mood and affect  NERVOUS SYSTEM:  A/O x3, No focal deficits    LABS:                        9.3    11.16 )-----------( 309      ( 30 May 2024 05:20 )             28.1     05-30    132  |  98  |  28  ----------------------------<  114  4.3   |  20  |  1.34    Ca    9.4      30 May 2024 05:20  Phos  3.0     05-30  Mg     2.00     05-30    TPro  x   /  Alb  x   /  TBili  x   /  DBili  x   /  AST  71  /  ALT  53  /  AlkPhos  x   05-28  PT/INR - ( 27 May 2024 21:10 )   PT: 13.1 sec;   INR: 1.18 ratio    PTT - ( 28 May 2024 12:28 )  PTT:45.0 sec    TSH 0.64   TSH with FT4 reflex --  Total T3 --    Culture - Urine (collected 28 May 2024 18:27)  Source: Clean Catch Clean Catch (Midstream)  Final Report (30 May 2024 11:48):    10,000 - 49,000 CFU/mL Escherichia coli ESBL    10,000 - 49,000 CFU/mL Proteus mirabilis  Organism: Escherichia coli ESBL  Proteus mirabilis (30 May 2024 11:48)  Organism: Proteus mirabilis (30 May 2024 11:48)      Method Type: JODY      -  Amoxicillin/Clavulanic Acid: S <=8/4      -  Ampicillin: S <=8 These ampicillin results predict results for amoxicillin      -  Ampicillin/Sulbactam: S <=4/2      -  Aztreonam: S <=4      -  Cefazolin: S <=2 For uncomplicated UTI with K. pneumoniae, E. coli, or P. mirablis: JODY <=16 is sensitive and JODY >=32 is resistant. This also predicts results for oral agents cefaclor, cefdinir, cefpodoxime, cefprozil, cefuroxime axetil, cephalexin and locarbef for uncomplicated UTI. Note that some isolates may be susceptible to these agents while testing resistant to cefazolin.      -  Cefepime: S <=2      -  Cefoxitin: S <=8      -  Ceftriaxone: S <=1      -  Cefuroxime: S <=4      -  Ciprofloxacin: S <=0.25      -  Ertapenem: S <=0.5      -  Gentamicin: S <=2      -  Levofloxacin: S <=0.5      -  Meropenem: S <=1      -  Nitrofurantoin: R >64 Should not be used to treat pyelonephritis      -  Piperacillin/Tazobactam: S <=8      -  Tobramycin: S <=2      -  Trimethoprim/Sulfamethoxazole: S <=0.5/9.5  Organism: Escherichia coli ESBL (30 May 2024 11:48)      Method Type: JODY      -  Ampicillin: R >16 These ampicillin results predict results for amoxicillin      -  Ampicillin/Sulbactam: I 16/8      -  Aztreonam: R >16      -  Cefazolin: R >16 For uncomplicated UTI with K. pneumoniae, E. coli, or P. mirablis: JODY <=16 is sensitive and JODY >=32 is resistant. This also predicts results for oral agents cefaclor, cefdinir, cefpodoxime, cefprozil, cefuroxime axetil, cephalexin and locarbef for uncomplicated UTI. Note that some isolates may be susceptible to these agents while testing resistant to cefazolin.      -  Cefepime: R >16      -  Ceftriaxone: R >32      -  Cefuroxime: R >16      -  Ciprofloxacin: R >2      -  Ertapenem: S <=0.5      -  Gentamicin: S <=2      -  Imipenem: S <=1      -  Levofloxacin: R >4      -  Meropenem: S <=1      -  Nitrofurantoin: S <=32 Should not be used to treat pyelonephritis      -  Piperacillin/Tazobactam: S <=8      -  Tobramycin: R >8      -  Trimethoprim/Sulfamethoxazole: S <=0.5/9.5    Culture - Blood (collected 27 May 2024 23:35)  Source: .Blood Blood-Venous  Preliminary Report (30 May 2024 06:01):    No growth at 48 Hours    Culture - Blood (collected 27 May 2024 23:00)  Source: .Blood Blood-Peripheral  Preliminary Report (30 May 2024 06:01):    No growth at 48 Hours    Radiology:  < from: US Duplex Venous Lower Ext Complete, Bilateral (05.30.24 @ 19:35) >  FINDINGS:    RIGHT:  Normal compressibility of the RIGHT common femoral, profunda, popliteal,   and posterior and peroneal veins. There is chronic thrombus at the level   of the saphenofemoral junction as well as acute occlusive thrombus in the   femoral vein proximally and within the gastroc vein in the calf. Normal   phasic variation is noted.    LEFT:  Normal compressibility of the LEFT common femoral, femoral and popliteal   veins.  Doppler examination shows normal spontaneous and phasic flow.  No LEFT calf vein thrombosis is detected. Normal phasic variation is   noted.    IMPRESSION: Acute DVT involving theproximal femoral vein and gastroc   vein. 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 There are no   segmental perfusion defects in either lung. Decreased ventilation and   perfusion at the right lung base is secondary to an elevated   hemidiaphragm.    IMPRESSION: Very low probability of pulmonary embolus.    --- End of Report ---    < end of copied text >

## 2024-05-30 NOTE — CONSULT NOTE ADULT - ATTENDING COMMENTS
This 79 woman  with a history of CKD due to hydronephrosis from an obstructing pelvic mass.  Chronic RLE edema since 9/2023(2/2 R hydronephrosis from obstructing pelvic mass), Chronic RLE edema since 9/2023, Hematology consulted for elevated D-Dimer.  Patient D dimer elevated by active malignancy as part of an acute phase reactant. Also has a new RLE DVT.       Hypercoagulable state workup not indicated. Elderly woman with multiple risk factors.

## 2024-05-31 ENCOUNTER — TRANSCRIPTION ENCOUNTER (OUTPATIENT)
Age: 79
End: 2024-05-31

## 2024-05-31 DIAGNOSIS — Z51.5 ENCOUNTER FOR PALLIATIVE CARE: ICD-10-CM

## 2024-05-31 DIAGNOSIS — Z71.89 OTHER SPECIFIED COUNSELING: ICD-10-CM

## 2024-05-31 LAB
ANION GAP SERPL CALC-SCNC: 13 MMOL/L — SIGNIFICANT CHANGE UP (ref 7–14)
APTT BLD: 43.1 SEC — HIGH (ref 24.5–35.6)
BUN SERPL-MCNC: 28 MG/DL — HIGH (ref 7–23)
CALCIUM SERPL-MCNC: 9.2 MG/DL — SIGNIFICANT CHANGE UP (ref 8.4–10.5)
CHLORIDE SERPL-SCNC: 98 MMOL/L — SIGNIFICANT CHANGE UP (ref 98–107)
CO2 SERPL-SCNC: 21 MMOL/L — LOW (ref 22–31)
CREAT SERPL-MCNC: 1.33 MG/DL — HIGH (ref 0.5–1.3)
D DIMER BLD IA.RAPID-MCNC: HIGH NG/ML DDU
D DIMER BLD IA.RAPID-MCNC: HIGH NG/ML DDU
EGFR: 40 ML/MIN/1.73M2 — LOW
GLUCOSE SERPL-MCNC: 118 MG/DL — HIGH (ref 70–99)
HCT VFR BLD CALC: 27.5 % — LOW (ref 34.5–45)
HGB BLD-MCNC: 9 G/DL — LOW (ref 11.5–15.5)
INR BLD: 1.12 RATIO — SIGNIFICANT CHANGE UP (ref 0.85–1.18)
MAGNESIUM SERPL-MCNC: 1.9 MG/DL — SIGNIFICANT CHANGE UP (ref 1.6–2.6)
MCHC RBC-ENTMCNC: 30.5 PG — SIGNIFICANT CHANGE UP (ref 27–34)
MCHC RBC-ENTMCNC: 32.7 GM/DL — SIGNIFICANT CHANGE UP (ref 32–36)
MCV RBC AUTO: 93.2 FL — SIGNIFICANT CHANGE UP (ref 80–100)
NRBC # BLD: 0 /100 WBCS — SIGNIFICANT CHANGE UP (ref 0–0)
NRBC # FLD: 0 K/UL — SIGNIFICANT CHANGE UP (ref 0–0)
PHOSPHATE SERPL-MCNC: 3 MG/DL — SIGNIFICANT CHANGE UP (ref 2.5–4.5)
PLATELET # BLD AUTO: 329 K/UL — SIGNIFICANT CHANGE UP (ref 150–400)
POTASSIUM SERPL-MCNC: 4.3 MMOL/L — SIGNIFICANT CHANGE UP (ref 3.5–5.3)
POTASSIUM SERPL-SCNC: 4.3 MMOL/L — SIGNIFICANT CHANGE UP (ref 3.5–5.3)
PROTHROM AB SERPL-ACNC: 12.5 SEC — SIGNIFICANT CHANGE UP (ref 9.5–13)
RBC # BLD: 2.95 M/UL — LOW (ref 3.8–5.2)
RBC # FLD: 13.5 % — SIGNIFICANT CHANGE UP (ref 10.3–14.5)
SODIUM SERPL-SCNC: 132 MMOL/L — LOW (ref 135–145)
WBC # BLD: 14.42 K/UL — HIGH (ref 3.8–10.5)
WBC # FLD AUTO: 14.42 K/UL — HIGH (ref 3.8–10.5)

## 2024-05-31 PROCEDURE — 99233 SBSQ HOSP IP/OBS HIGH 50: CPT

## 2024-05-31 RX ORDER — APIXABAN 2.5 MG/1
10 TABLET, FILM COATED ORAL EVERY 12 HOURS
Refills: 0 | Status: DISCONTINUED | OUTPATIENT
Start: 2024-05-31 | End: 2024-06-04

## 2024-05-31 RX ORDER — MAGNESIUM SULFATE 500 MG/ML
1 VIAL (ML) INJECTION ONCE
Refills: 0 | Status: COMPLETED | OUTPATIENT
Start: 2024-05-31 | End: 2024-05-31

## 2024-05-31 RX ORDER — SENNA PLUS 8.6 MG/1
2 TABLET ORAL AT BEDTIME
Refills: 0 | Status: DISCONTINUED | OUTPATIENT
Start: 2024-05-31 | End: 2024-06-04

## 2024-05-31 RX ADMIN — HEPARIN SODIUM 1200 UNIT(S)/HR: 5000 INJECTION INTRAVENOUS; SUBCUTANEOUS at 06:33

## 2024-05-31 RX ADMIN — LIDOCAINE 1 PATCH: 4 CREAM TOPICAL at 19:37

## 2024-05-31 RX ADMIN — APIXABAN 10 MILLIGRAM(S): 2.5 TABLET, FILM COATED ORAL at 10:46

## 2024-05-31 RX ADMIN — HEPARIN SODIUM 2000 UNIT(S): 5000 INJECTION INTRAVENOUS; SUBCUTANEOUS at 06:40

## 2024-05-31 RX ADMIN — Medication 100 GRAM(S): at 10:46

## 2024-05-31 RX ADMIN — ERTAPENEM SODIUM 100 MILLIGRAM(S): 1 INJECTION, POWDER, LYOPHILIZED, FOR SOLUTION INTRAMUSCULAR; INTRAVENOUS at 12:15

## 2024-05-31 RX ADMIN — SENNA PLUS 2 TABLET(S): 8.6 TABLET ORAL at 21:32

## 2024-05-31 RX ADMIN — HEPARIN SODIUM 1200 UNIT(S)/HR: 5000 INJECTION INTRAVENOUS; SUBCUTANEOUS at 07:34

## 2024-05-31 RX ADMIN — Medication 3 MILLIGRAM(S): at 21:33

## 2024-05-31 RX ADMIN — APIXABAN 10 MILLIGRAM(S): 2.5 TABLET, FILM COATED ORAL at 17:33

## 2024-05-31 RX ADMIN — LIDOCAINE 1 PATCH: 4 CREAM TOPICAL at 12:15

## 2024-05-31 NOTE — PROGRESS NOTE ADULT - SUBJECTIVE AND OBJECTIVE BOX
[facility] Geriatrics and Palliative Care  Juanita Zuniga MD, Palliative Care Attending  Contact Info: Page [phone] (including Nights/Weekends), message on Microsoft Teams (Juanita Zuniga MD), or leave VM at Palliative Office [phone] (non-urgent)   Date of [id number]     SUBJECTIVE AND OBJECTIVE:  Patient seen this morning at bedside. She shared she's doing okay, no acute complaints.      Indication for Geriatrics and Palliative Care Services/INTERVAL HPI: goals of care     INTERVAL EVENTS:  no acute events    DNR on chart:  Allergies    No Known Allergies    Intolerances    MEDICATIONS  (STANDING):  apixaban 10 milliGRAM(s) Oral every 12 hours  ertapenem  IVPB 500 milliGRAM(s) IV Intermittent every 24 hours  lidocaine   4% Patch 1 Patch Transdermal daily  melatonin 3 milliGRAM(s) Oral at bedtime    MEDICATIONS  (PRN):  acetaminophen     Tablet .. 650 milliGRAM(s) Oral every 6 hours PRN Temp greater or equal to 38C (100.4F), Mild Pain (1 - 3)      ITEMS UNCHECKED ARE NOT PRESENT    PRESENT SYMPTOMS: [ ]Unable to self-report - see [ ] CPOT [ ] PAINADS [ ] RDOS  Source if other than patient:  [ ]Family   [ ]Team     Pain:  [ ]yes [ x]no  QOL impact - mild to moderate  Location -                  RLE   Aggravating factors - ambulation   Quality - sharp  Radiation - R knee to feet  Timing- intermittent   Severity (0-10 scale): 0  Minimal acceptable level/ pain goal (0-10 scale):     CPOT:    https://www.[facility].org/getattachment/[email or web address]-[email or web address]/Critical-Care-Pain-Observation-Tool-(CPOT)    Dyspnea:                           [ ]Mild [ ]Moderate [ ]Severe  Anxiety:                             [ ]Mild [ ]Moderate [ ]Severe  Fatigue:                             [x ]Mild [ ]Moderate [ ]Severe  Nausea:                             [ ]Mild [ ]Moderate [ ]Severe  Loss of appetite:              [ ]Mild [ ]Moderate [ ]Severe  Constipation:                    [ ]Mild [ ]Moderate [ ]Severe  Other Symptoms:  [x ]All other review of systems negative     PCSSQ[Palliative Care Spiritual Screening Question]   Severity (0-10):  Score of 4 or > indicate consideration of Chaplaincy referral.  Chaplaincy Referral: [ ] yes [ ] refused [ ] following [ ] deferred    Caregiver [place]? : [ ] yes [ ] no [ ] Deferred [ ] Declined             Social work referral [ ] Patient & Family Centered Care Referral [ ]  Anticipatory Grief present?:  [ ] yes [ ] no  [ ] Deferred                  Social work referral [ ] Patient & Family Centered Care Referral [ ]      PHYSICAL EXAM:  Vital Signs Last 24 Hrs  T(C): 37.6 (31 May 2024 12:45), Max: 37.6 (30 May 2024 17:33)  T(F): 99.6 (31 May 2024 12:45), Max: 99.6 (30 May 2024 17:33)  HR: 94 (31 May 2024 12:45) (91 - 103)  BP: 133/53 (31 May 2024 12:45) (128/59 - 139/65)  BP(mean): --  RR: 18 (31 May 2024 12:45) (18 - 18)  SpO2: 98% (31 May 2024 12:45) (97% - 98%)    Parameters below as of 31 May 2024 12:45  Patient On (Oxygen Delivery Method): room air         General: well nourished, alert, NAD  HENT: normocephalic, face relaxed  Eyes: no icterus, conjunctiva clear  Chest: symmetric excursion, no accessory muscle use  Heart: peripheral pulse 2+ and regular  Lungs: no dyspnea with speech  Abdomen: soft, NT, ND  Ext: no edema  Neuro: alert, coherent speech, grossly non-focal  Psych: calm, rational, linear thought process  Skin: ]Normal  [ ]Rash  [ ]Other  [ ]Pressure ulcer(s) [ ]y [ ]n present on admission    CRITICAL CARE:  [ ]Shock Present  [ ]Septic [ ]Cardiogenic [ ]Neurologic [ ]Hypovolemic  [ ]Vasopressors [ ]Inotropes  [ ]Respiratory failure present [ ]Mechanical Ventilation [ ]Non-invasive ventilatory support [ ]High-Flow   [ ]Acute  [ ]Chronic [ ]Hypoxic  [ ]Hypercarbic [ ]Other  [ ]Other organ failure       LABS:                          9.0    14.42 )-----------( 329      ( 31 May 2024 05:05 )             27.5     05-31    132<L>  |  98  |  28<H>  ----------------------------<  118<H>  4.3   |  21<L>  |  1.33<H>    Ca    9.2      31 May 2024 05:05  Phos  3.0     05-31  Mg     1.90     05-31      PT/INR - ( 31 May 2024 05:05 )   PT: 12.5 sec;   INR: 1.12 ratio         PTT - ( 31 May 2024 05:05 )  PTT:43.1 sec            RADIOLOGY & ADDITIONAL STUDIES:  < from: US Duplex Venous Lower Ext Complete, Bilateral (05.30.24 @ 19:35) >    IMPRESSION: Acute DVT involving theproximal femoral vein and gastroc   vein. 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.Findings were   discussed with the patient's clinician, Brandi Wei DO at 8:22 5/30/24    < end of copied text >      Protein Calorie Malnutrition Present: [ ]mild [ ]moderate [ ]severe [ ]underweight [ ]morbid obesity  https://www.andeal.org/vault/2440/web/files/ONC/Table_Clinical%20Characteristics%20to%20Document%20Malnutrition-White%20JV%20et%20al%202012.pdf    Height (cm): 154.9 (05-27-24 @ 18:41), 154.9 (01-05-24 @ 11:51), 157 (12-15-23 @ 09:11)  Weight (kg): 57 (05-27-24 @ 18:41), 60.9 (01-05-24 @ 11:51), 61.2 (12-15-23 @ 09:11)  BMI (kg/m2): 23.8 (05-27-24 @ 18:41), 25.4 (01-05-24 @ 11:51), 24.8 (12-15-23 @ 09:11)    [ ]PPSV2 < or = 30%  [ ]significant weight loss [ ]poor nutritional intake [ ]anasarca[ ]Artificial Nutrition    Other REFERRALS:  [ ]Hospice  [ ]Child Life  [ ]Social Work  [ ]Case management [ ]Holistic Therapy     Goals of Care Document:

## 2024-05-31 NOTE — DISCHARGE NOTE PROVIDER - CARE PROVIDER_API CALL
Estrella Ling  Radiation Oncology  [address] [facility] [place] [address]  Phone: [phone]  Fax: [phone]  Scheduled Appointment: 06/07/2024 10:00 AM    Goldberg, Bradley Harris  Hematology  [address] [id number]  Phone: [phone]  Fax: [phone]  Follow Up Time: 2 weeks

## 2024-05-31 NOTE — DISCHARGE NOTE PROVIDER - NSDCMRMEDTOKEN_GEN_ALL_CORE_FT
acetaminophen/hydrocodone 5-300mg: every 12 hours for severe pain PRN   acetaminophen 325 mg oral tablet: 2 tab(s) orally every 6 hours As needed Temp greater or equal to 38C (100.4F), Mild Pain (1 - 3)  apixaban 5 mg oral tablet: 2 tab(s) orally every 12 hours take 10mg po BID for 4 more doses then change to 5mg po BID  lidocaine 4% topical film: Apply topically to affected area once a day  melatonin 3 mg oral tablet: 1 tab(s) orally once a day (at bedtime)  senna leaf extract oral tablet: 2 tab(s) orally once a day (at bedtime)  sodium chloride 1 g oral tablet: 2 tab(s) orally 3 times a day for 2 weeks then re-eval with labs

## 2024-05-31 NOTE — DISCHARGE NOTE PROVIDER - PROVIDER TOKENS
PROVIDER:[TOKEN:[[id number]],SCHEDULEDAPPT:[06/07/2024],SCHEDULEDAPPTTIME:[10:00 AM]],PROVIDER:[TOKEN:[[id number]],FOLLOWUP:[2 weeks]]

## 2024-05-31 NOTE — DISCHARGE NOTE PROVIDER - NSDCFUSCHEDAPPT_GEN_ALL_CORE_FT
Estrella Ling  [facility]  [facility] [address] [facility]  Scheduled Appointment: 06/07/2024

## 2024-05-31 NOTE — DISCHARGE NOTE PROVIDER - NSFOLLOWUPCLINICS_GEN_ALL_ED_FT
[facility]  Urology  [address] [place] [address] - [address] [place] [address]  Phone: [phone]  Fax:   Follow Up Time: 2 weeks

## 2024-05-31 NOTE — DISCHARGE NOTE PROVIDER - NSDCCPTREATMENT_GEN_ALL_CORE_FT
PRINCIPAL PROCEDURE  Procedure: CT chest w con  Findings and Treatment: LUNGS AND AIRWAYS: Patent central airways.  Mild dependent changes are   seen posteriorly. 3 mm right middle lobe pulmonary nodule seen along the   fissure (304:126). There is a 2 mm subpleural nodule the lingula   (304:142).  PLEURA: No pleural effusion. Or pneumothorax.  MEDIASTINUM AND MARYAN: There is a small hiatal hernia. No pathologically   enlarged lymph nodes.  VESSELS: The ascending aorta is mildly dilated measuring 3.7 cm. The main   pulmonary artery is also mildly dilated measuring 3.1 cm. This is   nonspecific, though can be seen with pulmonary arterial hypertension..  HEART: Heart size is top normal. Trace pericardial fluid is noted.  CHEST WALL AND LOWER NECK: There is a markedly enlarged thyroid gland   with associated compression of the subglottic trachea and mild leftward   deviation. Calcifications of the thyroid are noted. Coarse calcifications   of the left breast appreciated. Correlate with the patient's most recent   mammogram..  VISUALIZED UPPER ABDOMEN: No change since CT scan of 10/17/2023..  BONES: Degenerative changes.  IMPRESSION:  Several pulmonary nodules which measure up to 3 mm. Multiple nodules with   the largest measuring less than 6 mm do not require routine follow-up.   Certain patients at high risk with suspicious nodule morphology, upper   lobe location or both may warrant 12-month follow-up. -- ?Reference:   Guidelines for Management of Incidental Pulmonary Nodules Detected on CT   Images: From the Fleischner Society 2017?  Ascending aortic dilatation at 3.7 cm.  Main pulmonary arterial enlargement, nonspecific though mayindicate   underlying pulmonary arterial hypertension.  Goiter.        SECONDARY PROCEDURE  Procedure: US Doppler vein of extremity lower right  Findings and Treatment: RIGHT:  Normal compressibility of the RIGHT common femoral, profunda, popliteal,   and posterior and peroneal veins. There is chronic thrombus at the level   of the saphenofemoral junction as well as acute occlusive thrombus in the   femoral vein proximally and within the gastroc vein in the calf. Normal   phasic variation is noted.  LEFT:  Normal compressibility of the LEFT common femoral, femoral and popliteal   veins.  Doppler examination shows normal spontaneous and phasic flow.  No LEFT calf vein thrombosis is detected. Normal phasic variation is   noted.  IMPRESSION: Acute DVT involving theproximal femoral vein and gastroc   vein. 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    Procedure: XR knee, 3 views  Findings and Treatment: IMPRESSION:  No fracture, dislocation, or joint effusion.  Preserved joint spaces with smooth and intact articular surfaces. No   joint margin erosions or inta-articular or periarticular calcifications.  Unremarkable quadriceps and patellar tendon shadows.  Generalized osteopenia otherwise no discrete lytic or blastic lesions.      Procedure: Electrolytes  Findings and Treatment: LABS:                        9.0    14.42 )-----------( 329      ( 31 May 2024 05:05 )             27.5   05-31  132<L>  |  98  |  28<H>  ----------------------------<  118<H>  4.3   |  21<L>  |  1.33<H>  Ca    9.2      31 May 2024 05:05  Phos  3.0     05-31  Mg     1.90     05-31  PT/INR - ( 31 May 2024 05:05 )   PT: 12.5 sec;   INR: 1.12 ratio       PTT - ( 31 May 2024 05:05 )  PTT:43.1 sec  D-Dimer Assay, Quantitative: [id number]:      Procedure: Doppler ultrasound of left kidney  Findings and Treatment: Right kidney: 10.1 cm. Moderate hydronephrosis. Renal cortical thinning.  Left kidney: 9.2 cm. No hydronephrosis.  Urinary bladder: Contains debris. Prevoid bladder volume: 461 mL. Post   void bladder volume: 373 mL.  IMPRESSION:  Moderate right hydronephrosis.  Urinary bladder is distended, even after voiding. Please correlate for   urinary retention.      Procedure: 2D echo  Findings and Treatment:

## 2024-05-31 NOTE — DISCHARGE NOTE PROVIDER - NSDCCPCAREPLAN_GEN_ALL_CORE_FT
PRINCIPAL DISCHARGE DIAGNOSIS  Diagnosis: DVT, lower extremity  Assessment and Plan of Treatment: You came to the hospital with increased swelling of the leg and were ultimately found to have a blood clot in the right leg. You were started on a blood thinner, eliquis for this. You will need to continue taking this as prescribed for life as it likely formed due to your cancer. Please follow with your oncologist within 2 weeks of discharge.      SECONDARY DISCHARGE DIAGNOSES  Diagnosis: Acute UTI  Assessment and Plan of Treatment: You were found to have a urine infection and your urine culture grew E coli and proteus that were resistent to usual antibiotics (ESBL). You were started on IV ertapenem and completed your course of treatment.    Diagnosis: IRENE (acute kidney injury)  Assessment and Plan of Treatment: When you came to the hospital your kidney showed signs of irritation. 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 Please follow with your primary care doctor for further treatment. Your kidney function improved over the course of your hospitalization.    Diagnosis: Urinary retention  Assessment and Plan of Treatment: You were found to be retaining urine in the hospital. This is likely both due to your urine infection and also pelvic mass causing some blockage of urine flow. A ayala was placed and you will need to follow with urology after discharge for further management.     PRINCIPAL DISCHARGE DIAGNOSIS  Diagnosis: DVT, lower extremity  Assessment and Plan of Treatment: You came to the hospital with increased swelling of the leg and were ultimately found to have a blood clot in the right leg. You were started on a blood thinner, Eliquis continue takng 10mg oral 2xdaily x 4 more doses then start taking 5mg oral 2 xdaily. You will need to continue taking this as prescribed for life as it likely formed due to your cancer. Please follow with your oncologist within 2 weeks of discharge.      SECONDARY DISCHARGE DIAGNOSES  Diagnosis: Acute UTI  Assessment and Plan of Treatment: You were found to have a urine infection and your urine culture grew E coli and proteus that were resistent to usual antibiotics (ESBL). You were started on IV Ertapenem and completed your course of treatment. Follow up with your Provider as outpatient    Diagnosis: IRENE (acute kidney injury)  Assessment and Plan of Treatment: When you came to the hospital your kidney showed signs of irritation. 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 Please follow with your primary care doctor for further treatment. Your kidney function improved over the course of your hospitalization.    Diagnosis: Chronic hyponatremia  Assessment and Plan of Treatment: You were started on Sosium tab 2g oral 3xdaily continue taking for 2 weeks then follow up with your doctor to check the blood work and Sodium level    Diagnosis: Urinary retention  Assessment and Plan of Treatment: You were found to be retaining urine in the hospital. This is likely both due to your urine infection and also pelvic mass causing some blockage of urine flow. A ayala was placed and you will need to follow with urology after discharge for further management.

## 2024-05-31 NOTE — PROGRESS NOTE ADULT - TIME BILLING
Time spent for extensive review of the physical chart, electronic medical record, and documentation to obtain collateral information including but not limited to:  [x ] Inpatient records (ED, H&P, primary team, and consultants if applicable, care coordination)  [x] Inpatient values/results (biomarkers, immunoassays, imaging, and microbiology results)  [x] Current or proposed treatment plans  [x] Discussion with the primary team  [x] Discussion with the patient, surrogate decision maker, or family    Time spent: >50 mins
Time spent for extensive review of the physical chart, electronic medical record, and documentation to obtain collateral information including but not limited to:  [x ] Inpatient records (ED, H&P, primary team, and consultants if applicable, care coordination)  [x] Inpatient values/results (biomarkers, immunoassays, imaging, and microbiology results)  [x] Current or proposed treatment plans  [x] Discussion with the primary team  [x] Discussion with the patient, surrogate decision maker, or family    Time spent: >50 mins
review of laboratory data, radiology results, consultants' recommendations, documentation in [place], discussion with patient/ACP and interdisciplinary staff (such as , social workers, etc). Interventions were performed as documented above.
review of laboratory data, radiology results, consultants' recommendations, documentation in [place], discussion with patient/ACP and interdisciplinary staff (such as , social workers, etc). Interventions were performed as documented above.
review of laboratory data, radiology results, consultants' recommendations, documentation in [place], discussion with patient/ACP and interdisciplinary staff (such as , social workers, etc). Interventions were performed as documented above.

## 2024-05-31 NOTE — DISCHARGE NOTE PROVIDER - HOSPITAL COURSE
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 Found to have elevated d-dimer and IRENE on CKD, now improving. D-dimer continues to rise, for which hematology is now consulted --> repeat LE dopplers +RLE femoral DVT; s/p hep gtt and now transitioned to eliquis. Febrile on arrival with UA+ s/p CTX, however UCx with ESBL E coli/proteus, now on ertapenem. VQ scan neg, US KUB with mod R hydro and bladder distention, and TTE neg. Course c/b urinary retention for which ayala was placed 5/30. Palliative and rad-onc following. PT recs JESSICA -- pt in agreement.       Nutritional Assessment:  · Nutritional Assessment	This patient has been assessed with a concern for Malnutrition and has been determined to have a diagnosis/diagnoses of Moderate protein-calorie malnutrition.    This patient is being managed with:   Diet Regular-  Supplement Feeding Modality:  Oral  Ensure Max Cans or Servings Per Day:  1       Frequency:  Three Times a day  Entered: May 29 2024  5:40PM     Problem/Plan - 1:  ·  Problem: SOB (shortness of breath).   ·  Plan: - pt p/w CP and SOB  - meeting SIRS criteria on arrival as below  - EKG NSR without ischemic changes  - d-dimer 5084, trop neg x2, , TSH WNL, lactate neg  - low c/f ACS  - CXR neg  - VQ scan neg as below  - TTE WNL  - symptoms have improved  - comfortable on RA.     Problem/Plan - 2:  ·  Problem: Positive D dimer.   ·  Plan: - d-dimer 5084 on arrival, continues to uptrend -- 12K 5/30 --> will not continue to trend  - inc RLE edema / chronic   - high risk for DVT/PE given malignancy  - initial LE dopplers neg --> 5/30 repeat LE dopplers with +R femoral DVT  - VQ scan low probability of PE   - was on hep gtt --> now transitioned to eliquis 10mg BID x7d, 5mg BID indefinitely   - heme consulted, in agreement.     Problem/Plan - 3:  ·  Problem: Acute UTI.   ·  Plan: - pt c/o abd pain, urinary incont, and urinary frequency  - on arrival meeting SIRS criteria with likely urinary source --> sepsis  - lactate neg  - UA+ with UCx now growing ESBL E coli and proteus  - s/p CTX x3d  - start ertapenem 5/30 -- planned for 3d course  - c/b urinary retention requiring ayala placement.     Problem/Plan - 4:  ·  Problem: Acute kidney injury superimposed on CKD.   ·  Plan: - Cr 2.02 on arrival; baseline 1.5 from chart review  - downtrending   - UA+ with UCx in lab, as above  - US KUB with mod R hydro and bladder distention despite voiding.     Problem/Plan - 5:  ·  Problem: Macrocytic anemia.   ·  Plan: - Hb 8.6 with MCV 92  - no active s/s bleeding  - likely in setting of malignancy  - outpt f/u.     Problem/Plan - 6:  ·  Problem: Transaminitis.   ·  Plan: - AST 94 -- 71, ALT 55 -- 53  - likely in setting of poor PO intake   - improving  - avoid hepatotoxic agents.     Problem/Plan - 7:  ·  Problem: Hydronephrosis, right.   ·  Plan: - US KUB with mod R hydro, BW distended even post void  - urinary retention requiring ayala as above  - palliative following, appreciate recs   - likely contributing to IRENE as above.     Problem/Plan - 8:  ·  Problem: Malignancy.   ·  Plan: 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 Goldberg at [facility] and Dr. Ling rad-onc, has yet to pursue RT and is "considering it"  - rad-onc consulted, appreciate recs --> to f/u with outpt Dr. Ling 6/7 10am -- pt in agreement  - palliative following, appreciate recs  - pt states malignancy does not impact her daily living/QOL and she is unsure of the benefits of raditation / need for treatment, would like to discuss further   - outpt onc f/u  - outpt gyn-onc f/u.     Problem/Plan - 9:  ·  Problem: Edema of right lower leg.   ·  Plan: - chronic and at baseline  - dopplers as above  - XR knees c/w OA  - management as above.     Problem/Plan - 10:  ·  Problem: Type 2 diabetes mellitus.   ·  Plan; - A1C 4.9 -- ?falsely low in setting of kidney dysfunction  - monitor glu on BMP. 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 Found to have elevated d-dimer and IRENE on CKD, now improving. D-dimer continues to rise, for which hematology is now consulted --> repeat LE dopplers +RLE femoral DVT; s/p hep gtt and now transitioned to eliquis. Febrile on arrival with UA+ s/p CTX, however UCx with ESBL E coli/proteus, now on ertapenem. VQ scan neg, US KUB with mod R hydro and bladder distention, and TTE neg. Course c/b urinary retention for which ayala was placed 5/30. Palliative and rad-onc following. PT recs JESSICA -- pt in agreement    This patient is being managed with -Diet Regular-  Supplement Feeding Modality- Oral  Ensure Max Cans or Servings Per Day:  1       Frequency:  Three Times a day  Entered: May 29 2024  5:40PM    Chronic hyponatremia- Worsening from 132-129 >130  - SIADH in setting of malignancy   - Patient euvolemic on exam -Free water restriction.    SOB - pt p/w CP and SOB, meeting SIRS criteria on arrival as below  - EKG NSR without ischemic changes  - D-dimer 5084, trop neg x2, , TSH WNL, lactate neg  - CXR neg, TTE  EF 67% Calcification of the mitral valve annulus. Mild MR  - V/Q scan low probability of PE  - symptoms have improved, comfortable on RA    Positive D dimer- d-dimer 5084 on arrival, continues to uptrend -- 12K 5/30 --> will not continue to trend  - inc RLE edema / chronic   - high risk for DVT/PE given malignancy  - initial LE dopplers neg --> 5/30 repeat LE dopplers with +R femoral DVT  - VQ scan low probability of PE   - was on hep gtt --> now transitioned to eliquis 10mg BID x7d, 5mg BID indefinitely   - Heme consulted, in agreement.    Acute UTI- pt c/o abd pain, urinary incont, and urinary frequency  - on arrival meeting SIRS criteria with likely urinary source --> sepsis  - lactate neg, UA+ with UCx now growing ESBL E coli and proteus  - s/p CTX x3d  - start ertapenem 5/30 -- planned for 3d course, low grade fever, if Fever >101  - c/b urinary retention requiring ayala placement.    Acute kidney injury superimposed on CKD- Cr 2.02 on arrival; baseline 1.5 from chart review  - downtrending , now plateau   - UA+ with UCx in lab, as above  - US KUB with mod R hydro and bladder distention despite voiding    Macrocytic anemia- Hb 8.6 with MCV 92, no active s/s bleeding  - likely in setting of malignancy, outpt f/u.    Transaminitis- AST 94 -- 71, ALT 55 -- 53  - likely in setting of poor PO intake, improving  - avoid hepatotoxic agents    Hydronephrosis, right- US KUB with mod R hydro, BW distended even post void  - urinary retention requiring Ayala as above  - Palliative following, appreciate recs     Malignancy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 Goldberg at [facility] and Dr. Ling rad-onc, has yet to pursue RT and is "considering it"  - Rad-onc consulted--> to f/u with outpt Dr. Ling 6/7 10am -- pt in agreement  - Palliative following  - pt states malignancy does not impact her daily living / QOL and she is unsure of the benefits of raditation / need for treatment, would like to discuss further   - outpt Onc f/u  - outpt Gyn-onc f/u.    Edema of right lower leg - chronic and at baseline  - dopplers. XR knees c/w OA    Type 2 diabetes mellitus- A1C 4.9 -- ?falsely low in setting of kidney dysfunction  - monitor glu on BMP    Code: full  dispo: pending medical optimization, PT recs JESSICA 78 yo F with PMH pelvic CA (likely gyn etiology, SCC vs. urothelial carcinoma; diffuse metastatic disease), CKD, RLL edema, HTN, HLD, T2D, and goiter p/w CP and SOB. Found to have elevated d-dimer and IRENE on CKD, now improving. D-dimer continues to rise, for which hematology is now consulted --> repeat LE dopplers +RLE femoral DVT; s/p hep gtt and now transitioned to eliquis. Febrile on arrival with UA+ s/p CTX, however UCx with ESBL E coli/proteus, now on ertapenem. VQ scan neg, US KUB with mod R hydro and bladder distention, and TTE neg. Course c/b urinary retention for which ayala was placed 5/30. Palliative and rad-onc following. PT recs JESSICA -- pt in agreement.     Problem/Plan - 1:  ·  Problem: Chronic hyponatremia.   ·  Plan: Worsening from 132-129 >130 -->129 -->132  DC home on salt tab 2 g tid  SIADH in setting of malignancy   Patient euvolemic on exam  Free water restriction.     Problem/Plan - 2:  ·  Problem: SOB (shortness of breath).   ·  Plan: - pt p/w CP and SOB  - meeting SIRS criteria on arrival as below  - EKG NSR without ischemic changes  - d-dimer 5084, trop neg x2, , TSH WNL, lactate neg  - low c/f ACS  - CXR neg  - VQ scan neg as below  - TTE WNL  - symptoms have improved  - comfortable on RA.     Problem/Plan - 3:  ·  Problem: Positive D dimer.   ·  Plan: - d-dimer 5084 on arrival, continues to uptrend -- 12K 5/30 --> will not continue to trend  - inc RLE edema / chronic   - high risk for DVT/PE given malignancy  - initial LE dopplers neg --> 5/30 repeat LE dopplers with +R femoral DVT. 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  - VQ scan low probability of PE , LVEF 67% on TTE   - was on hep gtt --> now transitioned to eliquis 10mg BID x7d, then 5mg BID indefinitely   - heme consulted, in agreement.     Problem/Plan - 4:  ·  Problem: Acute UTI.   ·  Plan: - pt c/o abd pain, urinary incont, and urinary frequency  - on arrival meeting SIRS criteria with likely urinary source --> sepsis  - lactate neg  - UA+ with UCx now growing ESBL E coli and proteus  - s/p CTX x3d  - start ertapenem 5/30 -6/1  for 3d course, low grade fever, if Fever >101 , repeat BC, UC   - WBC uptrending 14.5, restart Ertapenem (6/3--), #5 on 6/4  - Renal US mod R hydronephrosis, CT A/p (6/3) reviewed pelvic mass causing severe R hydro.  Soft tissue lesion in the region of the cervixwith resultant severe  right hydroureteronephrosis, without significant change.   Case d/w urology resident Dr. Brody, previous renal scan demonstrated 4% right kidney function, 96% left kidney function, essentially Lt working kidney, no intervention wouldn't improve overall renal fxn, bladder distention on CT, Ayala malpositioned, Ayala replaced this morning.   Per , no acute urological intervention, no indication for repeat renal scan or NT by IR,  cleared for DC with outpt f/up.   - maintain Ayala for urinary retention, outpt f/up Urology.     Problem/Plan - 5:  ·  Problem: Acute kidney injury superimposed on CKD.   ·  Plan: - Cr 2.02 on arrival; baseline 1.5 from chart review  - downtrending , now plateau   - UA+ with UCx in lab, as above  - US KUB with mod R hydro and bladder distention despite voiding  - Maintain Ayala.     Problem/Plan - 6:  ·  Problem: Macrocytic anemia.   ·  Plan: - Hb 8.6 with MCV 92  - no active s/s bleeding  - likely in setting of malignancy  - outpt f/u.     Problem/Plan - 7:  ·  Problem: Transaminitis.   ·  Plan: - AST 94 -- 71, ALT 55 -- 53  - likely in setting of poor PO intake   - improving  - avoid hepatotoxic agents.     Problem/Plan - 8:  ·  Problem: Hydronephrosis, right.   ·  Plan: - US KUB with mod R hydro, BW distended even post void  - urinary retention requiring ayala as above  - palliative following, appreciate recs   - likely contributing to IRENE as above.     Problem/Plan - 9:  ·  Problem: Malignancy.   ·  Plan: 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 Goldberg at [facility] and Dr. Ling rad-onc, has yet to pursue RT and is "considering it"  - rad-onc consulted, appreciate recs --> to f/u with outpt Dr. Ling 6/7 10am -- pt in agreement  - palliative following, appreciate recs  - pt states malignancy does not impact her daily living/QOL and she is unsure of the benefits of raditation / need for treatment, would like to discuss further   - outpt onc f/u  - outpt gyn-onc f/u.     Problem/Plan - 10:  ·  Problem: Edema of right lower leg.   ·  Plan; - chronic and at baseline  - dopplers as above  - XR knees c/w OA  - management as above.

## 2024-05-31 NOTE — PROGRESS NOTE ADULT - SUBJECTIVE AND OBJECTIVE BOX
[facility] Department of Hospital Medicine  Brandi Wei DO  Available on MS Teams  Pager: [phone]    Patient is a 79y old  Female who presents with a chief complaint of Dyspnea     (29 May 2024 10:44)    Subjective:  Pt seen and examined at bedside resting comfortably, just finished lunch. Ate 1/2 her tray. Feels well. Discussed findings of DVT RLE -- relieved that we finally found what was going on. Discussed plan for eliquis - in agreement; discussed minimizing fall risks etc to minimize bleeding. Discussed PT recs JESSICA -- in agreement; daughter to pick facility. No other questions. Daughter to visit this evening.     Vital Signs Last 24 Hrs  T(C): 37.6 (31 May 2024 12:45), Max: 37.6 (30 May 2024 17:33)  T(F): 99.6 (31 May 2024 12:45), Max: 99.6 (30 May 2024 17:33)  HR: 94 (31 May 2024 12:45) (91 - 103)  BP: 133/53 (31 May 2024 12:45) (128/59 - 139/65)  BP(mean): --  RR: 18 (31 May 2024 12:45) (18 - 18)  SpO2: 98% (31 May 2024 12:45) (97% - 98%)    Parameters below as of 31 May 2024 12:45  Patient On (Oxygen Delivery Method): room air    PHYSICAL EXAM:  Constitutional: resting comfortably in bed; NAD  Head: NC/AT  Eyes: PERRL, EOMI, anicteric sclera  ENT: no nasal discharge; MMM  Neck: supple; no JVD  Respiratory: CTA B/L; no W/R/R  Cardiac: +S1/S2; RRR; no M/R/G  Gastrointestinal: soft, NT/ND; no rebound or guarding; +BSx4  Extremities: WWP, no clubbing or cyanosis; RLE > LLE 1+ edema  Musculoskeletal: NROM x4; no joint swelling, tenderness or erythema; mm strength 5/5 throughout all extremities; sensation intact and equal through all extremities  Vascular: 2+ radial, DP/PT pulses B/L  Dermatologic: skin warm, dry and intact; no rashes, wounds, or scars  Neurologic: AAOx3; CNII-XII grossly intact; no focal deficits  Psychiatric: affect and characteristics of appearance, verbalizations, behaviors are appropriate    MEDICATIONS  (STANDING):  apixaban 10 milliGRAM(s) Oral every 12 hours  ertapenem  IVPB 500 milliGRAM(s) IV Intermittent every 24 hours  lidocaine   4% Patch 1 Patch Transdermal daily  melatonin 3 milliGRAM(s) Oral at bedtime    MEDICATIONS  (PRN):  acetaminophen     Tablet .. 650 milliGRAM(s) Oral every 6 hours PRN Temp greater or equal to 38C (100.4F), Mild Pain (1 - 3)    LABS:                        9.0    14.42 )-----------( 329      ( 31 May 2024 05:05 )             27.5     05-31    132<L>  |  98  |  28<H>  ----------------------------<  118<H>  4.3   |  21<L>  |  1.33<H>    Ca    9.2      31 May 2024 05:05  Phos  3.0     05-31  Mg     1.90     05-31      PT/INR - ( 31 May 2024 05:05 )   PT: 12.5 sec;   INR: 1.12 ratio         PTT - ( 31 May 2024 05:05 )  PTT:43.1 sec  Urinalysis Basic - ( 31 May 2024 05:05 )    Color: x / Appearance: x / SG: x / pH: x  Gluc: 118 mg/dL / Ketone: x  / Bili: x / Urobili: x   Blood: x / Protein: x / Nitrite: x   Leuk Esterase: x / RBC: x / WBC x   Sq Epi: x / Non Sq Epi: x / Bacteria: x      CAPILLARY BLOOD GLUCOSE      POCT Blood Glucose.: 132 mg/dL (30 May 2024 22:15)      RADIOLOGY & ADDITIONAL TESTS: Reviewed.

## 2024-06-01 DIAGNOSIS — E87.1 HYPO-OSMOLALITY AND HYPONATREMIA: ICD-10-CM

## 2024-06-01 LAB
ANION GAP SERPL CALC-SCNC: 13 MMOL/L — SIGNIFICANT CHANGE UP (ref 7–14)
BUN SERPL-MCNC: 32 MG/DL — HIGH (ref 7–23)
CALCIUM SERPL-MCNC: 8.7 MG/DL — SIGNIFICANT CHANGE UP (ref 8.4–10.5)
CHLORIDE SERPL-SCNC: 96 MMOL/L — LOW (ref 98–107)
CO2 SERPL-SCNC: 20 MMOL/L — LOW (ref 22–31)
CREAT ?TM UR-MCNC: 100 MG/DL — SIGNIFICANT CHANGE UP
CREAT SERPL-MCNC: 1.33 MG/DL — HIGH (ref 0.5–1.3)
EGFR: 40 ML/MIN/1.73M2 — LOW
GLUCOSE SERPL-MCNC: 84 MG/DL — SIGNIFICANT CHANGE UP (ref 70–99)
HCT VFR BLD CALC: 26.1 % — LOW (ref 34.5–45)
HGB BLD-MCNC: 8.7 G/DL — LOW (ref 11.5–15.5)
MAGNESIUM SERPL-MCNC: 2.1 MG/DL — SIGNIFICANT CHANGE UP (ref 1.6–2.6)
MCHC RBC-ENTMCNC: 30.9 PG — SIGNIFICANT CHANGE UP (ref 27–34)
MCHC RBC-ENTMCNC: 33.3 GM/DL — SIGNIFICANT CHANGE UP (ref 32–36)
MCV RBC AUTO: 92.6 FL — SIGNIFICANT CHANGE UP (ref 80–100)
NRBC # BLD: 0 /100 WBCS — SIGNIFICANT CHANGE UP (ref 0–0)
NRBC # FLD: 0 K/UL — SIGNIFICANT CHANGE UP (ref 0–0)
OSMOLALITY UR: 695 MOSM/KG — SIGNIFICANT CHANGE UP (ref 50–1200)
PHOSPHATE SERPL-MCNC: 3.1 MG/DL — SIGNIFICANT CHANGE UP (ref 2.5–4.5)
PLATELET # BLD AUTO: 292 K/UL — SIGNIFICANT CHANGE UP (ref 150–400)
POTASSIUM SERPL-MCNC: 5.1 MMOL/L — SIGNIFICANT CHANGE UP (ref 3.5–5.3)
POTASSIUM SERPL-SCNC: 5.1 MMOL/L — SIGNIFICANT CHANGE UP (ref 3.5–5.3)
RBC # BLD: 2.82 M/UL — LOW (ref 3.8–5.2)
RBC # FLD: 14.1 % — SIGNIFICANT CHANGE UP (ref 10.3–14.5)
SODIUM SERPL-SCNC: 129 MMOL/L — LOW (ref 135–145)
SODIUM UR-SCNC: 97 MMOL/L — SIGNIFICANT CHANGE UP
WBC # BLD: 13.25 K/UL — HIGH (ref 3.8–10.5)
WBC # FLD AUTO: 13.25 K/UL — HIGH (ref 3.8–10.5)

## 2024-06-01 PROCEDURE — 99233 SBSQ HOSP IP/OBS HIGH 50: CPT

## 2024-06-01 RX ADMIN — LIDOCAINE 1 PATCH: 4 CREAM TOPICAL at 18:03

## 2024-06-01 RX ADMIN — Medication 650 MILLIGRAM(S): at 19:11

## 2024-06-01 RX ADMIN — LIDOCAINE 1 PATCH: 4 CREAM TOPICAL at 11:19

## 2024-06-01 RX ADMIN — ERTAPENEM SODIUM 100 MILLIGRAM(S): 1 INJECTION, POWDER, LYOPHILIZED, FOR SOLUTION INTRAMUSCULAR; INTRAVENOUS at 12:47

## 2024-06-01 RX ADMIN — Medication 650 MILLIGRAM(S): at 19:50

## 2024-06-01 RX ADMIN — Medication 3 MILLIGRAM(S): at 21:05

## 2024-06-01 RX ADMIN — APIXABAN 10 MILLIGRAM(S): 2.5 TABLET, FILM COATED ORAL at 17:29

## 2024-06-01 RX ADMIN — LIDOCAINE 1 PATCH: 4 CREAM TOPICAL at 22:44

## 2024-06-01 RX ADMIN — APIXABAN 10 MILLIGRAM(S): 2.5 TABLET, FILM COATED ORAL at 05:12

## 2024-06-01 RX ADMIN — SENNA PLUS 2 TABLET(S): 8.6 TABLET ORAL at 21:04

## 2024-06-01 NOTE — PROGRESS NOTE ADULT - SUBJECTIVE AND OBJECTIVE BOX
Medicine Progress Note    Patient is a 79y old  Female who presents with a chief complaint of chest pain (31 May 2024 15:12)      SUBJECTIVE / OVERNIGHT EVENTS: no complaints , low grade fever     ADDITIONAL REVIEW OF SYSTEMS:    MEDICATIONS  (STANDING):  apixaban 10 milliGRAM(s) Oral every 12 hours  lidocaine   4% Patch 1 Patch Transdermal daily  melatonin 3 milliGRAM(s) Oral at bedtime  senna 2 Tablet(s) Oral at bedtime    MEDICATIONS  (PRN):  acetaminophen     Tablet .. 650 milliGRAM(s) Oral every 6 hours PRN Temp greater or equal to 38C (100.4F), Mild Pain (1 - 3)    CAPILLARY BLOOD GLUCOSE        I&O's Summary    31 May 2024 07:01  -  01 Jun 2024 07:00  --------------------------------------------------------  IN: 0 mL / OUT: 800 mL / NET: -800 mL    01 Jun 2024 07:01  -  01 Jun 2024 19:58  --------------------------------------------------------  IN: 720 mL / OUT: 550 mL / NET: 170 mL        PHYSICAL EXAM:  Vital Signs Last 24 Hrs  T(C): 37.2 (01 Jun 2024 12:49), Max: 37.8 (31 May 2024 21:28)  T(F): 98.9 (01 Jun 2024 12:49), Max: 100.1 (31 May 2024 21:28)  HR: 96 (01 Jun 2024 12:49) (93 - 99)  BP: 136/63 (01 Jun 2024 12:49) (119/52 - 136/63)  BP(mean): --  RR: 18 (01 Jun 2024 12:49) (18 - 18)  SpO2: 96% (01 Jun 2024 12:49) (96% - 97%)    Parameters below as of 01 Jun 2024 12:49  Patient On (Oxygen Delivery Method): room air      CONSTITUTIONAL: NAD,   ENMT: Moist oral mucosa, no pharyngeal injection or exudates;   RESPIRATORY: Normal respiratory effort; lungs are clear to auscultation bilaterally  CARDIOVASCULAR: Regular rate and rhythm, normal S1 and S2,; RT lower extremity edema;   ABDOMEN: Nontender to palpation, normoactive bowel sounds, no rebound/guarding;   PSYCH: A+O to person, place, and time; affect appropriate  NEUROLOGY: CN 2-12 are intact and symmetric; no gross sensory deficits   SKIN: No rashes;     LABS:                        8.7    13.25 )-----------( 292      ( 01 Jun 2024 05:30 )             26.1     06-01    129<L>  |  96<L>  |  32<H>  ----------------------------<  84  5.1   |  20<L>  |  1.33<H>    Ca    8.7      01 Jun 2024 05:30  Phos  3.1     06-01  Mg     2.10     06-01      PT/INR - ( 31 May 2024 05:05 )   PT: 12.5 sec;   INR: 1.12 ratio         PTT - ( 31 May 2024 05:05 )  PTT:43.1 sec      Urinalysis Basic - ( 01 Jun 2024 05:30 )    Color: x / Appearance: x / SG: x / pH: x  Gluc: 84 mg/dL / Ketone: x  / Bili: x / Urobili: x   Blood: x / Protein: x / Nitrite: x   Leuk Esterase: x / RBC: x / WBC x   Sq Epi: x / Non Sq Epi: x / Bacteria: x            RADIOLOGY & ADDITIONAL TESTS:  Imaging from Last 24 Hours:    Electrocardiogram/QTc Interval:    COORDINATION OF CARE:  Care Discussed with Consultants/Other Providers: ACP

## 2024-06-01 NOTE — PROGRESS NOTE ADULT - PROBLEM SELECTOR PLAN 12
- F: none  - E: replete K<4, Mg<2  - N: DASH/TLC  - D: eliquis 10mg BID  - G: none    code: full  dispo: pending medical optimization, PT recs JESSICA

## 2024-06-02 LAB
ANION GAP SERPL CALC-SCNC: 11 MMOL/L — SIGNIFICANT CHANGE UP (ref 7–14)
BUN SERPL-MCNC: 34 MG/DL — HIGH (ref 7–23)
CALCIUM SERPL-MCNC: 9.1 MG/DL — SIGNIFICANT CHANGE UP (ref 8.4–10.5)
CHLORIDE SERPL-SCNC: 96 MMOL/L — LOW (ref 98–107)
CO2 SERPL-SCNC: 23 MMOL/L — SIGNIFICANT CHANGE UP (ref 22–31)
CREAT SERPL-MCNC: 1.46 MG/DL — HIGH (ref 0.5–1.3)
CULTURE RESULTS: SIGNIFICANT CHANGE UP
CULTURE RESULTS: SIGNIFICANT CHANGE UP
EGFR: 36 ML/MIN/1.73M2 — LOW
GLUCOSE SERPL-MCNC: 100 MG/DL — HIGH (ref 70–99)
HCT VFR BLD CALC: 27.1 % — LOW (ref 34.5–45)
HGB BLD-MCNC: 8.7 G/DL — LOW (ref 11.5–15.5)
MAGNESIUM SERPL-MCNC: 2.1 MG/DL — SIGNIFICANT CHANGE UP (ref 1.6–2.6)
MCHC RBC-ENTMCNC: 30 PG — SIGNIFICANT CHANGE UP (ref 27–34)
MCHC RBC-ENTMCNC: 32.1 GM/DL — SIGNIFICANT CHANGE UP (ref 32–36)
MCV RBC AUTO: 93.4 FL — SIGNIFICANT CHANGE UP (ref 80–100)
NRBC # BLD: 0 /100 WBCS — SIGNIFICANT CHANGE UP (ref 0–0)
NRBC # FLD: 0 K/UL — SIGNIFICANT CHANGE UP (ref 0–0)
PHOSPHATE SERPL-MCNC: 3.9 MG/DL — SIGNIFICANT CHANGE UP (ref 2.5–4.5)
PLATELET # BLD AUTO: 397 K/UL — SIGNIFICANT CHANGE UP (ref 150–400)
POTASSIUM SERPL-MCNC: 4.5 MMOL/L — SIGNIFICANT CHANGE UP (ref 3.5–5.3)
POTASSIUM SERPL-SCNC: 4.5 MMOL/L — SIGNIFICANT CHANGE UP (ref 3.5–5.3)
RBC # BLD: 2.9 M/UL — LOW (ref 3.8–5.2)
RBC # FLD: 13.9 % — SIGNIFICANT CHANGE UP (ref 10.3–14.5)
SODIUM SERPL-SCNC: 130 MMOL/L — LOW (ref 135–145)
SPECIMEN SOURCE: SIGNIFICANT CHANGE UP
SPECIMEN SOURCE: SIGNIFICANT CHANGE UP
WBC # BLD: 15.89 K/UL — HIGH (ref 3.8–10.5)
WBC # FLD AUTO: 15.89 K/UL — HIGH (ref 3.8–10.5)

## 2024-06-02 PROCEDURE — 99232 SBSQ HOSP IP/OBS MODERATE 35: CPT

## 2024-06-02 RX ADMIN — Medication 3 MILLIGRAM(S): at 21:58

## 2024-06-02 RX ADMIN — LIDOCAINE 1 PATCH: 4 CREAM TOPICAL at 00:07

## 2024-06-02 RX ADMIN — APIXABAN 10 MILLIGRAM(S): 2.5 TABLET, FILM COATED ORAL at 18:15

## 2024-06-02 RX ADMIN — SENNA PLUS 2 TABLET(S): 8.6 TABLET ORAL at 21:59

## 2024-06-02 RX ADMIN — APIXABAN 10 MILLIGRAM(S): 2.5 TABLET, FILM COATED ORAL at 05:39

## 2024-06-02 NOTE — PROGRESS NOTE ADULT - SUBJECTIVE AND OBJECTIVE BOX
Medicine Progress Note    Patient is a 79y old  Female who presents with a chief complaint of chest pain (01 Jun 2024 19:58)      SUBJECTIVE / OVERNIGHT EVENTS: no events , denies any complaints     ADDITIONAL REVIEW OF SYSTEMS: negative     MEDICATIONS  (STANDING):  apixaban 10 milliGRAM(s) Oral every 12 hours  lidocaine   4% Patch 1 Patch Transdermal daily  melatonin 3 milliGRAM(s) Oral at bedtime  senna 2 Tablet(s) Oral at bedtime    MEDICATIONS  (PRN):  acetaminophen     Tablet .. 650 milliGRAM(s) Oral every 6 hours PRN Temp greater or equal to 38C (100.4F), Mild Pain (1 - 3)    CAPILLARY BLOOD GLUCOSE        I&O's Summary    01 Jun 2024 07:01  -  02 Jun 2024 07:00  --------------------------------------------------------  IN: 720 mL / OUT: 1100 mL / NET: -380 mL        PHYSICAL EXAM:  Vital Signs Last 24 Hrs  T(C): 36.9 (02 Jun 2024 13:25), Max: 37.1 (02 Jun 2024 06:08)  T(F): 98.5 (02 Jun 2024 13:25), Max: 98.8 (02 Jun 2024 06:08)  HR: 98 (02 Jun 2024 13:25) (88 - 98)  BP: 123/61 (02 Jun 2024 13:25) (119/54 - 123/61)  BP(mean): --  RR: 18 (02 Jun 2024 13:25) (18 - 18)  SpO2: 98% (02 Jun 2024 13:25) (97% - 98%)    Parameters below as of 02 Jun 2024 13:25  Patient On (Oxygen Delivery Method): room air      CONSTITUTIONAL: NAD,   ENMT: Moist oral mucosa, no pharyngeal injection or exudates;  RESPIRATORY: Normal respiratory effort; lungs are clear to auscultation bilaterally  CARDIOVASCULAR: Regular rate and rhythm, normal S1 and S2,; No lower extremity edema;  ABDOMEN: Nontender to palpation, normoactive bowel sounds, no rebound/guarding;Haro   PSYCH: A+O to person, place, ; affect appropriate  NEUROLOGY :grossly non focal    SKIN: No rashes;    LABS:                        8.7    15.89 )-----------( 397      ( 02 Jun 2024 04:14 )             27.1     06-02    130<L>  |  96<L>  |  34<H>  ----------------------------<  100<H>  4.5   |  23  |  1.46<H>    Ca    9.1      02 Jun 2024 04:14  Phos  3.9     06-02  Mg     2.10     06-02            Urinalysis Basic - ( 02 Jun 2024 04:14 )    Color: x / Appearance: x / SG: x / pH: x  Gluc: 100 mg/dL / Ketone: x  / Bili: x / Urobili: x   Blood: x / Protein: x / Nitrite: x   Leuk Esterase: x / RBC: x / WBC x   Sq Epi: x / Non Sq Epi: x / Bacteria: x            RADIOLOGY & ADDITIONAL TESTS:  Imaging from Last 24 Hours:    Electrocardiogram/QTc Interval:    COORDINATION OF CARE:  Care Discussed with Consultants/Other Providers:

## 2024-06-03 LAB
ANION GAP SERPL CALC-SCNC: 11 MMOL/L — SIGNIFICANT CHANGE UP (ref 7–14)
BUN SERPL-MCNC: 35 MG/DL — HIGH (ref 7–23)
CALCIUM SERPL-MCNC: 8.6 MG/DL — SIGNIFICANT CHANGE UP (ref 8.4–10.5)
CHLORIDE SERPL-SCNC: 95 MMOL/L — LOW (ref 98–107)
CO2 SERPL-SCNC: 23 MMOL/L — SIGNIFICANT CHANGE UP (ref 22–31)
CREAT SERPL-MCNC: 1.44 MG/DL — HIGH (ref 0.5–1.3)
EGFR: 37 ML/MIN/1.73M2 — LOW
GLUCOSE SERPL-MCNC: 99 MG/DL — SIGNIFICANT CHANGE UP (ref 70–99)
HCT VFR BLD CALC: 25.1 % — LOW (ref 34.5–45)
HGB BLD-MCNC: 8.3 G/DL — LOW (ref 11.5–15.5)
MAGNESIUM SERPL-MCNC: 2.2 MG/DL — SIGNIFICANT CHANGE UP (ref 1.6–2.6)
MCHC RBC-ENTMCNC: 30.6 PG — SIGNIFICANT CHANGE UP (ref 27–34)
MCHC RBC-ENTMCNC: 33.1 GM/DL — SIGNIFICANT CHANGE UP (ref 32–36)
MCV RBC AUTO: 92.6 FL — SIGNIFICANT CHANGE UP (ref 80–100)
NRBC # BLD: 0 /100 WBCS — SIGNIFICANT CHANGE UP (ref 0–0)
NRBC # FLD: 0 K/UL — SIGNIFICANT CHANGE UP (ref 0–0)
PHOSPHATE SERPL-MCNC: 3.1 MG/DL — SIGNIFICANT CHANGE UP (ref 2.5–4.5)
PLATELET # BLD AUTO: 425 K/UL — HIGH (ref 150–400)
POTASSIUM SERPL-MCNC: 4.3 MMOL/L — SIGNIFICANT CHANGE UP (ref 3.5–5.3)
POTASSIUM SERPL-SCNC: 4.3 MMOL/L — SIGNIFICANT CHANGE UP (ref 3.5–5.3)
RBC # BLD: 2.71 M/UL — LOW (ref 3.8–5.2)
RBC # FLD: 14 % — SIGNIFICANT CHANGE UP (ref 10.3–14.5)
SODIUM SERPL-SCNC: 129 MMOL/L — LOW (ref 135–145)
WBC # BLD: 14.56 K/UL — HIGH (ref 3.8–10.5)
WBC # FLD AUTO: 14.56 K/UL — HIGH (ref 3.8–10.5)

## 2024-06-03 PROCEDURE — 74176 CT ABD & PELVIS W/O CONTRAST: CPT | Mod: 26

## 2024-06-03 PROCEDURE — 99232 SBSQ HOSP IP/OBS MODERATE 35: CPT

## 2024-06-03 RX ORDER — SODIUM CHLORIDE 9 MG/ML
3 INJECTION INTRAMUSCULAR; INTRAVENOUS; SUBCUTANEOUS THREE TIMES A DAY
Refills: 0 | Status: DISCONTINUED | OUTPATIENT
Start: 2024-06-03 | End: 2024-06-04

## 2024-06-03 RX ORDER — ERTAPENEM SODIUM 1 G/1
500 INJECTION, POWDER, LYOPHILIZED, FOR SOLUTION INTRAMUSCULAR; INTRAVENOUS EVERY 24 HOURS
Refills: 0 | Status: DISCONTINUED | OUTPATIENT
Start: 2024-06-03 | End: 2024-06-04

## 2024-06-03 RX ADMIN — APIXABAN 10 MILLIGRAM(S): 2.5 TABLET, FILM COATED ORAL at 17:23

## 2024-06-03 RX ADMIN — Medication 650 MILLIGRAM(S): at 21:15

## 2024-06-03 RX ADMIN — LIDOCAINE 1 PATCH: 4 CREAM TOPICAL at 22:00

## 2024-06-03 RX ADMIN — Medication 3 MILLIGRAM(S): at 21:15

## 2024-06-03 RX ADMIN — LIDOCAINE 1 PATCH: 4 CREAM TOPICAL at 10:42

## 2024-06-03 RX ADMIN — SODIUM CHLORIDE 3 GRAM(S): 9 INJECTION INTRAMUSCULAR; INTRAVENOUS; SUBCUTANEOUS at 21:15

## 2024-06-03 RX ADMIN — Medication 650 MILLIGRAM(S): at 22:15

## 2024-06-03 RX ADMIN — APIXABAN 10 MILLIGRAM(S): 2.5 TABLET, FILM COATED ORAL at 06:23

## 2024-06-03 RX ADMIN — LIDOCAINE 1 PATCH: 4 CREAM TOPICAL at 17:17

## 2024-06-03 RX ADMIN — ERTAPENEM SODIUM 100 MILLIGRAM(S): 1 INJECTION, POWDER, LYOPHILIZED, FOR SOLUTION INTRAMUSCULAR; INTRAVENOUS at 10:41

## 2024-06-03 NOTE — PROGRESS NOTE ADULT - SUBJECTIVE AND OBJECTIVE BOX
Dr. Karime Gao  Pager [phone]    PROGRESS NOTE:     Patient is a 79y old  Female who presents with a chief complaint of chest pain (02 Jun 2024 13:56)      SUBJECTIVE / OVERNIGHT EVENTS: denies chest pain or sob   ADDITIONAL REVIEW OF SYSTEMS: afebrile     MEDICATIONS  (STANDING):  apixaban 10 milliGRAM(s) Oral every 12 hours  ertapenem  IVPB 500 milliGRAM(s) IV Intermittent every 24 hours  lidocaine   4% Patch 1 Patch Transdermal daily  melatonin 3 milliGRAM(s) Oral at bedtime  senna 2 Tablet(s) Oral at bedtime    MEDICATIONS  (PRN):  acetaminophen     Tablet .. 650 milliGRAM(s) Oral every 6 hours PRN Temp greater or equal to 38C (100.4F), Mild Pain (1 - 3)      CAPILLARY BLOOD GLUCOSE        I&O's Summary    03 Jun 2024 07:01  -  03 Jun 2024 15:15  --------------------------------------------------------  IN: 0 mL / OUT: 600 mL / NET: -600 mL        PHYSICAL EXAM:  Vital Signs Last 24 Hrs  T(C): 36.8 (03 Jun 2024 10:05), Max: 37.2 (02 Jun 2024 21:58)  T(F): 98.3 (03 Jun 2024 10:05), Max: 98.9 (02 Jun 2024 21:58)  HR: 93 (03 Jun 2024 10:05) (93 - 95)  BP: 122/57 (03 Jun 2024 10:05) (110/57 - 122/57)  BP(mean): --  RR: 18 (03 Jun 2024 10:05) (16 - 18)  SpO2: 100% (03 Jun 2024 10:05) (98% - 100%)    Parameters below as of 03 Jun 2024 10:05  Patient On (Oxygen Delivery Method): room air      CONSTITUTIONAL: nad  RESPIRATORY: Normal respiratory effort; lungs are clear to auscultation bilaterally  CARDIOVASCULAR: Regular rate and rhythm, normal S1 and S2, no murmur/rub/gallop; No lower extremity edema; Peripheral pulses are 2+ bilaterally  ABDOMEN: Nontender to palpation, normoactive bowel sounds, no rebound/guarding; No hepatosplenomegaly  MUSCULOSKELETAL: no clubbing or cyanosis of digits; no joint swelling or tenderness to palpation  PSYCH: A+O to person, place, and time; affect appropriate    LABS:                        8.3    14.56 )-----------( 425      ( 03 Jun 2024 07:12 )             25.1     06-03    129<L>  |  95<L>  |  35<H>  ----------------------------<  99  4.3   |  23  |  1.44<H>    Ca    8.6      03 Jun 2024 07:12  Phos  3.1     06-03  Mg     2.20     06-03      Urinalysis Basic - ( 03 Jun 2024 07:12 )    Color: x / Appearance: x / SG: x / pH: x  Gluc: 99 mg/dL / Ketone: x  / Bili: x / Urobili: x   Blood: x / Protein: x / Nitrite: x   Leuk Esterase: x / RBC: x / WBC x   Sq Epi: x / Non Sq Epi: x / Bacteria: x      RADIOLOGY & ADDITIONAL TESTS:  Results Reviewed:   Imaging Personally Reviewed:  < from: US Duplex Venous Lower Ext Complete, Bilateral (05.30.24 @ 19:35) >  IMPRESSION: Acute DVT involving theproximal femoral vein and gastroc   vein. 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.Findings were   discussed with the patient's clinician, Brandi Wei DO at 8:22 5/30/24    Electrocardiogram Personally Reviewed:    COORDINATION OF CARE:  Care Discussed with Consultants/Other Providers [Y/N]:  Prior or Outpatient Records Reviewed [Y/N]:

## 2024-06-03 NOTE — PROGRESS NOTE ADULT - PROBLEM SELECTOR PLAN 12
- F: none  - E: replete K<4, Mg<2  - N: DASH/TLC  - D: eliquis 10mg BID  - G: none    code: full  dispo: pending medical optimization, DC plan to rehab per PT recs

## 2024-06-04 ENCOUNTER — TRANSCRIPTION ENCOUNTER (OUTPATIENT)
Age: 79
End: 2024-06-04

## 2024-06-04 VITALS
OXYGEN SATURATION: 99 % | SYSTOLIC BLOOD PRESSURE: 111 MMHG | HEART RATE: 82 BPM | RESPIRATION RATE: 17 BRPM | TEMPERATURE: 99 F | DIASTOLIC BLOOD PRESSURE: 58 MMHG

## 2024-06-04 LAB
ANION GAP SERPL CALC-SCNC: 13 MMOL/L — SIGNIFICANT CHANGE UP (ref 7–14)
BUN SERPL-MCNC: 34 MG/DL — HIGH (ref 7–23)
CALCIUM SERPL-MCNC: 8.7 MG/DL — SIGNIFICANT CHANGE UP (ref 8.4–10.5)
CHLORIDE SERPL-SCNC: 99 MMOL/L — SIGNIFICANT CHANGE UP (ref 98–107)
CO2 SERPL-SCNC: 20 MMOL/L — LOW (ref 22–31)
CREAT SERPL-MCNC: 1.35 MG/DL — HIGH (ref 0.5–1.3)
EGFR: 40 ML/MIN/1.73M2 — LOW
GLUCOSE SERPL-MCNC: 89 MG/DL — SIGNIFICANT CHANGE UP (ref 70–99)
HCT VFR BLD CALC: 25.5 % — LOW (ref 34.5–45)
HGB BLD-MCNC: 8.2 G/DL — LOW (ref 11.5–15.5)
MAGNESIUM SERPL-MCNC: 2.2 MG/DL — SIGNIFICANT CHANGE UP (ref 1.6–2.6)
MCHC RBC-ENTMCNC: 30 PG — SIGNIFICANT CHANGE UP (ref 27–34)
MCHC RBC-ENTMCNC: 32.2 GM/DL — SIGNIFICANT CHANGE UP (ref 32–36)
MCV RBC AUTO: 93.4 FL — SIGNIFICANT CHANGE UP (ref 80–100)
NRBC # BLD: 0 /100 WBCS — SIGNIFICANT CHANGE UP (ref 0–0)
NRBC # FLD: 0 K/UL — SIGNIFICANT CHANGE UP (ref 0–0)
PHOSPHATE SERPL-MCNC: 3.5 MG/DL — SIGNIFICANT CHANGE UP (ref 2.5–4.5)
PLATELET # BLD AUTO: 447 K/UL — HIGH (ref 150–400)
POTASSIUM SERPL-MCNC: 4.5 MMOL/L — SIGNIFICANT CHANGE UP (ref 3.5–5.3)
POTASSIUM SERPL-SCNC: 4.5 MMOL/L — SIGNIFICANT CHANGE UP (ref 3.5–5.3)
RBC # BLD: 2.73 M/UL — LOW (ref 3.8–5.2)
RBC # FLD: 13.9 % — SIGNIFICANT CHANGE UP (ref 10.3–14.5)
SODIUM SERPL-SCNC: 132 MMOL/L — LOW (ref 135–145)
WBC # BLD: 12.57 K/UL — HIGH (ref 3.8–10.5)
WBC # FLD AUTO: 12.57 K/UL — HIGH (ref 3.8–10.5)

## 2024-06-04 PROCEDURE — 99239 HOSP IP/OBS DSCHRG MGMT >30: CPT

## 2024-06-04 RX ORDER — SENNA PLUS 8.6 MG/1
2 TABLET ORAL
Qty: 0 | Refills: 0 | DISCHARGE
Start: 2024-06-04

## 2024-06-04 RX ORDER — SODIUM CHLORIDE 9 MG/ML
2 INJECTION INTRAMUSCULAR; INTRAVENOUS; SUBCUTANEOUS
Qty: 0 | Refills: 0 | DISCHARGE
Start: 2024-06-04

## 2024-06-04 RX ORDER — ACETAMINOPHEN 500 MG
2 TABLET ORAL
Qty: 0 | Refills: 0 | DISCHARGE
Start: 2024-06-04

## 2024-06-04 RX ORDER — APIXABAN 2.5 MG/1
2 TABLET, FILM COATED ORAL
Qty: 0 | Refills: 0 | DISCHARGE
Start: 2024-06-04

## 2024-06-04 RX ORDER — LIDOCAINE 4 G/100G
1 CREAM TOPICAL
Qty: 0 | Refills: 0 | DISCHARGE
Start: 2024-06-04

## 2024-06-04 RX ORDER — LANOLIN ALCOHOL/MO/W.PET/CERES
1 CREAM (GRAM) TOPICAL
Qty: 0 | Refills: 0 | DISCHARGE
Start: 2024-06-04

## 2024-06-04 RX ADMIN — ERTAPENEM SODIUM 100 MILLIGRAM(S): 1 INJECTION, POWDER, LYOPHILIZED, FOR SOLUTION INTRAMUSCULAR; INTRAVENOUS at 11:15

## 2024-06-04 RX ADMIN — APIXABAN 10 MILLIGRAM(S): 2.5 TABLET, FILM COATED ORAL at 05:49

## 2024-06-04 RX ADMIN — SODIUM CHLORIDE 3 GRAM(S): 9 INJECTION INTRAMUSCULAR; INTRAVENOUS; SUBCUTANEOUS at 12:23

## 2024-06-04 RX ADMIN — APIXABAN 10 MILLIGRAM(S): 2.5 TABLET, FILM COATED ORAL at 16:55

## 2024-06-04 RX ADMIN — SODIUM CHLORIDE 3 GRAM(S): 9 INJECTION INTRAMUSCULAR; INTRAVENOUS; SUBCUTANEOUS at 05:49

## 2024-06-04 NOTE — PROGRESS NOTE ADULT - PROBLEM SELECTOR PLAN 6
Palliative following for goals of care in the setting of malignancy. Patient defers conversations to daughter Oma.    5/30: Spoke to daughter regarding treatment options/plan -- daughter shared likely outpatient followup with Radiation oncology to discuss risks/benefits of treatment; they still are considering risk/benefits of treatment for cancer as patient has minimal symptoms burden and cares about her QOL. Discussed ACP --- daughter shared family makes decisions together as pt defers to her children; not all children are currently in agreement with proceeding with dnr/dni as Oma had previously expressed; spoke to Oma about risk benefits of resuscitation and advised to speak to patient and other children to make decisions that aligns with patient's QOL and goals of care. Shared that if family meeting is needed, palliative team is available to assist and answer questions other children may have. Also shared that I will follow up with patient separately to see what her goals may be.     5/31: patient herself shared that she would want Oma to be her HCP however did not wish to complete HCP form today; similarly expressed that she would not want cpr or intubation but defers completing molst without speaking to family. Patient understands she has cancer and is considering risk/benefits of treatment
- Hb 8.6 with MCV 92  - no active s/s bleeding  - likely in setting of malignancy  - outpt f/u
- Hb 8.6 with MCV 92  - no active s/s bleeding  - likely in setting of malignancy  - outpt f/u
Palliative following for goals of care in the setting of malignancy. Patient defers conversations to daughter Oma.    Spoke to daughter regarding treatment options/plan -- daughter shared likely outpatient followup with Radiation oncology to discuss risks/benefits of treatment; they still are considering risk/benefits of treatment for cancer as patient has minimal symptoms burden and cares about her QOL    Discussed ACP --- daughter shared family makes decisions together as pt defers to her children; not all children are currently in agreement with proceeding with dnr/dni as Oma had previously expressed; spoke to Oma about risk benefits of resuscitation and advised to speak to patient and other children to make decisions that aligns with patient's QOL and goals of care. Shared that if family meeting is needed, palliative team is available to assist and answer questions other children may have. Also shared that I will follow up with patient separately to see what her goals may be.       Juanita Zuniga MD  Palliative Medicine
- Hb 8.6 with MCV 92  - no active s/s bleeding  - likely in setting of malignancy  - outpt f/u
- AST 94 -- 71, ALT 55 -- 53  - likely in setting of poor PO intake   - improving  - avoid hepatotoxic agents
- AST 94 -- 71, ALT 55 -- 53  - likely in setting of poor PO intake   - improving  - avoid hepatotoxic agents
- Hb 8.6 with MCV 92  - no active s/s bleeding  - likely in setting of malignancy  - outpt f/u
- AST 94 -- 71, ALT 55 -- 53  - likely in setting of poor PO intake   - improving  - avoid hepatotoxic agents

## 2024-06-04 NOTE — PROGRESS NOTE ADULT - PROVIDER SPECIALTY LIST ADULT
Hospitalist
Palliative Care
Hospitalist
Palliative Care
Hospitalist
Hospitalist

## 2024-06-04 NOTE — DISCHARGE NOTE NURSING/CASE MANAGEMENT/SOCIAL WORK - PATIENT PORTAL LINK FT
You can access the FollowMyHealth Patient Portal offered by [facility] by registering at the following website: [email or web address]. By joining [facility]’s FollowMyHealth portal, you will also be able to view your health information using other applications (apps) compatible with our system.

## 2024-06-04 NOTE — PROGRESS NOTE ADULT - PROBLEM SELECTOR PLAN 11
- A1C 4.9 -- ?falsely low in setting of kidney dysfunction  - monitor glu on BMP
- F: none  - E: replete K<4, Mg<2  - N: DASH/TLC  - D: hep sq  - G: none    code: full  dispo: pending medical optimization, PT recs JESSICA
- A1C 4.9 -- ?falsely low in setting of kidney dysfunction  - monitor glu on BMP
- F: none  - E: replete K<4, Mg<2  - N: DASH/TLC  - D: hep sq  - G: none    code: full  dispo: pending medical optimization, PT recs JESSICA
- F: none  - E: replete K<4, Mg<2  - N: DASH/TLC  - D: eliquis 10mg BID  - G: none    code: full  dispo: pending medical optimization, PT recs JESSICA

## 2024-06-04 NOTE — PROGRESS NOTE ADULT - PROBLEM SELECTOR PROBLEM 11
Type 2 diabetes mellitus
Type 2 diabetes mellitus
Nutrition, metabolism, and development symptoms
Nutrition, metabolism, and development symptoms
Type 2 diabetes mellitus
Type 2 diabetes mellitus
Nutrition, metabolism, and development symptoms

## 2024-06-04 NOTE — PROGRESS NOTE ADULT - PROBLEM SELECTOR PROBLEM 9
Edema of right lower leg
Malignancy
Malignancy
Edema of right lower leg
Malignancy
Malignancy
Edema of right lower leg

## 2024-06-04 NOTE — PROGRESS NOTE ADULT - PROBLEM SELECTOR PLAN 3
- d-dimer 5084 on arrival, continues to uptrend -- 12K 5/30 --> will not continue to trend  - inc RLE edema / chronic   - high risk for DVT/PE given malignancy  - initial LE dopplers neg --> 5/30 repeat LE dopplers with +R femoral DVT  - VQ scan low probability of PE   - was on hep gtt --> now transitioned to eliquis 10mg BID x7d, 5mg BID indefinitely   - heme consulted, in agreement
- pt c/o abd pain, urinary incont, and urinary frequency  - on arrival meeting SIRS criteria with likely urinary source --> sepsis  - lactate neg  - UA+ with UCx now growing ESBL E coli and proteus  - s/p CTX x3d  - start ertapenem 5/30 -- planned for 3d course  - c/b urinary retention requiring ayala placement
- d-dimer 5084 on arrival, continues to uptrend -- 12K 5/30 --> will not continue to trend  - inc RLE edema / chronic   - high risk for DVT/PE given malignancy  - initial LE dopplers neg --> 5/30 repeat LE dopplers with +R femoral DVT  - VQ scan low probability of PE   - was on hep gtt --> now transitioned to eliquis 10mg BID x7d, 5mg BID indefinitely   - heme consulted, in agreement
- d-dimer 5084 on arrival, continues to uptrend -- 12K 5/30 --> will not continue to trend  - inc RLE edema / chronic   - high risk for DVT/PE given malignancy  - initial LE dopplers neg --> 5/30 repeat LE dopplers with +R femoral DVT. 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
Pt presented with weakness, fatigue  Started on heparin gtt d/t concern for possible PE; unable to obtain CTA  VQ scan: low probability of PE  PE ruled out
- pt c/o abd pain, urinary incont, and urinary frequency  - on arrival meeting SIRS criteria with likely urinary source --> sepsis  - lactate neg  - UA+ with UCx now growing ESBL E coli and proteus  - s/p CTX x3d  - start ertapenem 5/30
pt with pelvic mass, bx showing SCC vs. 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 Goldberg at [facility] and Dr. Ling rad-onc, has yet to pursue RT and is "considering it" as they shared they worry about risks/benefits of treatment as patient herself is asymptomatic and would not want treatment that may worsen QOL.   - rad-onc consulted: plan for f/u with outpt Dr. Ling 6/7 10am -- pt in agreement
- d-dimer 5084 on arrival, continues to uptrend -- 12K 5/30 --> will not continue to trend  - inc RLE edema / chronic   - high risk for DVT/PE given malignancy  - initial LE dopplers neg --> 5/30 repeat LE dopplers with +R femoral DVT. 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
- pt c/o abd pain, urinary incont, and urinary frequency  - on arrival meeting SIRS criteria with likely urinary source --> sepsis  - lactate neg  - UA+ with UCx in lab  - c/w CTX 1g q24h x3d

## 2024-06-04 NOTE — PROGRESS NOTE ADULT - PROBLEM SELECTOR PLAN 1
Pt presented with weakness, fatigue  Started on heparin gtt d/t concern for possible PE; unable to obtain CTA  Currently on heparin gtt  VQ scan: low probability of PE
- pt p/w CP and SOB  - meeting SIRS criteria on arrival as below  - EKG NSR without ischemic changes  - d-dimer 5084, trop neg x2, , TSH WNL, lactate neg  - low c/f ACS  - CXR neg  - VQ scan neg as below  - TTE WNL  - symptoms have improved  - comfortable on RA
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
pt with pelvic mass, bx showing SCC vs. 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 Goldberg at [facility] and Dr. Ling rad-onc, has yet to pursue RT and is "considering it" as they shared they worry about risks/benefits of treatment as patient herself is asymptomatic and would not want treatment that may worsen QOL.   - rad-onc consulted: plan for f/u with outpt Dr. Ling 6/7 10am -- pt in agreement
Worsening from 132-129   Will f/w urine lytes to eval etiology   Patient euvolemic on exam
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
- pt p/w CP and SOB  - meeting SIRS criteria on arrival as below  - EKG NSR without ischemic changes  - d-dimer 5084, trop neg x2, , TSH WNL, lactate neg  - low c/f ACS  - CXR neg  - VQ scan neg as below  - check TTE   - symptoms have improved  - comfortable on RA
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
- pt p/w CP and SOB  - meeting SIRS criteria on arrival as below  - EKG NSR without ischemic changes  - d-dimer 5084, trop neg x2, , TSH WNL, lactate neg  - low c/f ACS  - CXR neg  - VQ scan neg as below  - TTE WNL  - symptoms have improved  - comfortable on RA

## 2024-06-04 NOTE — CONSULT NOTE ADULT - CONSULT REASON
Elevated d-dimer
R sided hydronephrosis 2/2 obstructing pelvic mass
goals of care/symptoms management

## 2024-06-04 NOTE — PROGRESS NOTE ADULT - PROBLEM SELECTOR PLAN 7
- AST 94 -- 71, ALT 55 -- 53  - likely in setting of poor PO intake   - improving  - avoid hepatotoxic agents
- AST 94 -- 71, ALT 55 -- 53  - likely in setting of poor PO intake   - improving  - avoid hepatotoxic agents
Thank you for allowing us to participate in your patient's care. We will sign off at this time, please reconsult as needed.     Juanita Zuniga MD  Palliative Medicine
- US KUB with mod R hydro, BW distended even post void  - urinary retention requiring ayala as above  - palliative following, appreciate recs   - likely contributing to IRENE as above
- US KUB with mod R hydro, BW distended even post void  - BS q8h and PVR to assess for retention  - palliative following, appreciate recs   - likely contributing to IRENE as above
- AST 94 -- 71, ALT 55 -- 53  - likely in setting of poor PO intake   - improving  - avoid hepatotoxic agents
- AST 94 -- 71, ALT 55 -- 53  - likely in setting of poor PO intake   - improving  - avoid hepatotoxic agents
- US KUB with mod R hydro, BW distended even post void  - BS q8h and PVR to assess for retention  - palliative following, appreciate recs   - likely contributing to IRENE as above

## 2024-06-04 NOTE — PROGRESS NOTE ADULT - PROBLEM SELECTOR PROBLEM 10
Type 2 diabetes mellitus
Edema of right lower leg
Edema of right lower leg
Type 2 diabetes mellitus
Type 2 diabetes mellitus
Edema of right lower leg
Edema of right lower leg

## 2024-06-04 NOTE — PROGRESS NOTE ADULT - NUTRITIONAL ASSESSMENT
This patient has been assessed with a concern for Malnutrition and has been determined to have a diagnosis/diagnoses of Moderate protein-calorie malnutrition.    This patient is being managed with:   Diet Regular-  Supplement Feeding Modality:  Oral  Ensure Max Cans or Servings Per Day:  1       Frequency:  Three Times a day  Entered: May 29 2024  5:40PM  
This patient has been assessed with a concern for Malnutrition and has been determined to have a diagnosis/diagnoses of Moderate protein-calorie malnutrition.    This patient is being managed with:   Diet Regular-  Supplement Feeding Modality:  Oral  Ensure Max Cans or Servings Per Day:  1       Frequency:  Three Times a day  Entered: May 29 2024  5:40PM  
This patient has been assessed with a concern for Malnutrition and has been determined to have a diagnosis/diagnoses of Moderate protein-calorie malnutrition.    This patient is being managed with:   Diet DASH/TLC-  Sodium & Cholesterol Restricted  Consistent Carbohydrate {Evening Snack} (CSTCHOSN)  Entered: May 28 2024 11:12AM  
This patient has been assessed with a concern for Malnutrition and has been determined to have a diagnosis/diagnoses of Moderate protein-calorie malnutrition.    This patient is being managed with:   Diet Regular-  Supplement Feeding Modality:  Oral  Ensure Max Cans or Servings Per Day:  1       Frequency:  Three Times a day  Entered: May 29 2024  5:40PM  
MEDICATIONS  (STANDING):  ertapenem  IVPB 500 milliGRAM(s) IV Intermittent every 24 hours  heparin  Infusion.  Unit(s)/Hr (11 mL/Hr) IV Continuous <Continuous>  lidocaine   4% Patch 1 Patch Transdermal daily  melatonin 3 milliGRAM(s) Oral at bedtime  sodium chloride 0.9%. 1000 milliLiter(s) (75 mL/Hr) IV Continuous <Continuous>    MEDICATIONS  (PRN):  acetaminophen     Tablet .. 650 milliGRAM(s) Oral every 6 hours PRN Temp greater or equal to 38C (100.4F), Mild Pain (1 - 3)  heparin   Injectable 4500 Unit(s) IV Push every 6 hours PRN For aPTT less than 40  heparin   Injectable 2000 Unit(s) IV Push every 6 hours PRN For aPTT between 40 - 57
This patient has been assessed with a concern for Malnutrition and has been determined to have a diagnosis/diagnoses of Moderate protein-calorie malnutrition.    This patient is being managed with:   Diet Regular-  Supplement Feeding Modality:  Oral  Ensure Max Cans or Servings Per Day:  1       Frequency:  Three Times a day  Entered: May 29 2024  5:40PM  

## 2024-06-04 NOTE — PROGRESS NOTE ADULT - PROBLEM SELECTOR PROBLEM 4
Acute UTI
Acute UTI
Acute kidney injury superimposed on CKD
Acute UTI
Acute kidney injury superimposed on CKD
Acute kidney injury superimposed on CKD
Acute UTI
Acute UTI
Acute kidney injury superimposed on CKD

## 2024-06-04 NOTE — PROGRESS NOTE ADULT - PROBLEM SELECTOR PROBLEM 7
Transaminitis
Encounter for palliative care
Hydronephrosis, right
Transaminitis
Transaminitis
Hydronephrosis, right
Transaminitis
Hydronephrosis, right

## 2024-06-04 NOTE — PROGRESS NOTE ADULT - PROBLEM SELECTOR PROBLEM 2
SOB (shortness of breath)
Positive D dimer
Positive D dimer
SOB (shortness of breath)
Acute kidney injury superimposed on CKD
Edema of right lower leg
SOB (shortness of breath)
SOB (shortness of breath)
Positive D dimer

## 2024-06-04 NOTE — PROGRESS NOTE ADULT - PROBLEM SELECTOR PLAN 12
- F: none  - E: replete K<4, Mg<2  - N: DASH/TLC  - D: eliquis 10mg BID  - G: none    code: full  Dispo: Medically cleared for DC to rehab, f/u SW - F: none  - E: replete K<4, Mg<2  - N: DASH/TLC  - D: eliquis 10mg BID  - G: none    code: full  Dispo: Medically cleared for DC to rehab, f/u SW  Time spent on discharge 35 minutes coordinating discharge plan and discussing with patient and family.

## 2024-06-04 NOTE — DISCHARGE NOTE NURSING/CASE MANAGEMENT/SOCIAL WORK - NSDCPEFALRISK_GEN_ALL_CORE
For information on Fall & Injury Prevention, visit: [email or web address] OR  [email or web address] OR  https://www.cdc.gov/steadi/patient.html

## 2024-06-04 NOTE — PROGRESS NOTE ADULT - PROBLEM SELECTOR PROBLEM 8
Malignancy
Hydronephrosis, right
Malignancy
Malignancy
Hydronephrosis, right

## 2024-06-04 NOTE — PROGRESS NOTE ADULT - PROBLEM SELECTOR PLAN 9
- chronic and at baseline  - BL LE dopplers neg   - XR knees c/w OA  - d-dimer elevated as above  - management as above
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 Goldberg at [facility] and Dr. Ling rad-onc, has yet to pursue RT and is "considering it"  - rad-onc consulted, appreciate recs --> to f/u with outpt Dr. Ling 6/7 10am -- pt in agreement  - palliative following, appreciate recs  - pt states malignancy does not impact her daily living/QOL and she is unsure of the benefits of raditation / need for treatment, would like to discuss further   - outpt onc f/u  - outpt gyn-onc f/u
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 Goldberg at [facility] and Dr. Ling rad-onc, has yet to pursue RT and is "considering it"  - rad-onc consulted, appreciate recs --> to f/u with outpt Dr. Ling 6/7 10am -- pt in agreement  - palliative following, appreciate recs  - pt states malignancy does not impact her daily living/QOL and she is unsure of the benefits of raditation / need for treatment, would like to discuss further   - outpt onc f/u  - outpt gyn-onc f/u
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 Goldberg at [facility] and Dr. Ling rad-onc, has yet to pursue RT and is "considering it"  - rad-onc consulted, appreciate recs --> to f/u with outpt Dr. Ling 6/7 10am -- pt in agreement  - palliative following, appreciate recs  - pt states malignancy does not impact her daily living/QOL and she is unsure of the benefits of raditation / need for treatment, would like to discuss further   - outpt onc f/u  - outpt gyn-onc f/u
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 Goldberg at [facility] and Dr. Ling rad-onc, has yet to pursue RT and is "considering it"  - rad-onc consulted, appreciate recs --> to f/u with outpt Dr. Ling 6/7 10am -- pt in agreement  - palliative following, appreciate recs  - pt states malignancy does not impact her daily living/QOL and she is unsure of the benefits of raditation / need for treatment, would like to discuss further   - outpt onc f/u  - outpt gyn-onc f/u
- chronic and at baseline  - dopplers as above  - XR knees c/w OA  - management as above
- chronic and at baseline  - BL LE dopplers neg   - XR knees c/w OA  - d-dimer elevated as above  - management as above

## 2024-06-04 NOTE — PROGRESS NOTE ADULT - PROBLEM SELECTOR PROBLEM 1
feroz esomeprazole approved
SOB (shortness of breath)
Chronic hyponatremia
SOB (shortness of breath)
Chronic hyponatremia
Malignancy
SOB (shortness of breath)
SOB (shortness of breath)
Chronic hyponatremia
Chronic hyponatremia

## 2024-06-04 NOTE — PROGRESS NOTE ADULT - PROBLEM SELECTOR PROBLEM 5
Edema of right lower leg
Macrocytic anemia
Acute kidney injury superimposed on CKD
Acute UTI
Acute kidney injury superimposed on CKD

## 2024-06-04 NOTE — CONSULT NOTE ADULT - ASSESSMENT
Patient's right kidney is essentially non-functioning as demonstrated by 4% function shown on renal lasix scan performed in 8/2023    Recommendations:  - No urologic intervention at this time  - No need for repeat renal lasix scan  - Replace ayala into bladder    ***NOTE INCOMPLETE*** 79F with PMHx CKD (2/2 R hydronephrosis from obstructing pelvic mass), Chronic RLE edema since 9/2023, HTN, HLD, goiter, pelvic mass with mets initially dx 7/2023 with LN bx 11/2023 with SCC vs urothelial carcinoma (no treatment, pending patient/family decision on RT) presenting with chest pain 5/27 AM. Patient was seen by urology in 7/2023 for right hydronephrosis and CTAP 7/2023 demonstrated severe right hydroureteronephrosis 2/2 12cm cystic structure in pelvis, patient's SCr was 1.5 at the time and was discharged when SCr downtrended to 1.4. Patient then got renal lasix scan in 8/2023 that demonstrates 4%/96% right/left kidney function, respectively.   Urology was consulted today for R sided hydronephrosis 2/2 obstructing pelvic mass.  Patient is currently AF, /58, HR 94, 98% on RA, WBC 12.6, Hct 25.5, Cr 1.35 (from 1.43, which patient was discharged with during 7/2023 visit). Bcx negative, Ucx +10-49k ESBL E coli and Proteus both S> Ertapenem, which patient is on. Patient denies fevers, chills, cough, abdominal pain, vomiting, diarrhea, dysuria, hematuria, but per daughter has dark concentrated urine and some odor. 6/3/2024 CTAP was reviewed and demonstrates unchanged severe right hydroureteronephrosis with cortical atrophy, as well as a ayala catheter balloon that appears to be inflated in the uterus with bladder still very distended with urine.    Patient has effectively a solitary left kidney (4% R kidney function on 8/2023 renal lasix scan) and SCr is stable/downtrending from prior urologic assessment in 7/2023, so diversion of urine in that kidney via ureteral stent or nephrostomy tube would likely pose greater risk than benefit to patient.    Recommendations:  - No urologic intervention at this time  - No need to consult IR for nephrostomy tube  - No need for repeat renal lasix scan  - Replace ayala catheter  - Appreciate heme/onc recs re: appropriateness of possible cis/RT for pelvic mass given effectively solitary left kidney  - Continue abx per primary team  - Please reach out to urology if patient status changes (eg: right kidney demonstrates signs of infection, solitary left kidney demonstrates signs of obstruction)    ***NOTE INCOMPLETE*** 79F with PMHx CKD (2/2 R hydronephrosis from obstructing pelvic mass), Chronic RLE edema since 9/2023, HTN, HLD, goiter, pelvic mass with mets initially dx 7/2023 with LN bx 11/2023 with SCC vs urothelial carcinoma (no treatment, pending patient/family decision on RT) presenting with chest pain 5/27 AM. Patient was seen by urology in 7/2023 for right hydronephrosis and CTAP 7/2023 demonstrated severe right hydroureteronephrosis 2/2 12cm cystic structure in pelvis, patient's SCr was 1.5 at the time and was discharged when SCr downtrended to 1.4. Patient then got renal lasix scan in 8/2023 that demonstrates 4%/96% right/left kidney function, respectively.   Urology was consulted today for R sided hydronephrosis 2/2 obstructing pelvic mass.  Patient is currently AF, /58, HR 94, 98% on RA, WBC 12.6, Hct 25.5, Cr 1.35 (from 1.43, which patient was discharged with during 7/2023 visit). Bcx negative, Ucx +10-49k ESBL E coli and Proteus both S> Ertapenem, which patient is on. Patient denies fevers, chills, cough, abdominal pain, vomiting, diarrhea, dysuria, hematuria, but per daughter has dark concentrated urine and some odor. 6/3/2024 CTAP was reviewed and demonstrates unchanged severe right hydroureteronephrosis with cortical atrophy, as well as a ayala catheter balloon that appears to be inflated in the uterus with bladder still very distended with urine.    Patient has effectively a solitary left kidney (4% R kidney function on 8/2023 renal lasix scan) and SCr is stable/downtrending from prior urologic assessment in 7/2023, so diversion of urine in that kidney via ureteral stent or nephrostomy tube would likely pose greater risk than benefit to patient.    Recommendations:  - No urologic intervention at this time  - No need to consult IR for nephrostomy tube  - No need for repeat renal lasix scan  - Replace ayala catheter > replaced in AM, now secured and appropriately draining clear yellow urine  - Appreciate heme/onc recs re: appropriateness of possible cis/RT for pelvic mass given effectively solitary left kidney  - Continue abx per primary team  - Please reach out to urology if patient status changes (eg: right kidney demonstrates signs of infection, solitary left kidney demonstrates signs of obstruction)  - No objection to discharge from urologic perspective, [facility]  [address] [place] [address] [phone]    Discussed with Dr. Chidi Skelton

## 2024-06-04 NOTE — PROGRESS NOTE ADULT - ASSESSMENT
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 Found to have elevated d-dimer and IRENE on CKD, now improving. D-dimer continues to rise, for which hematology is now consulted. Febrile on arrival with UA+ s/p CTX, however UCx with ESBL E coli/proteus, now on ertapenem. LE dopplers neg, VQ scan neg, US KUB with mod R hydro and bladder distention, and TTE neg. Palliative and rad-onc following. 
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 Found to have elevated d-dimer and IRENE on CKD, now improving. D-dimer continues to rise, for which hematology is now consulted --> repeat LE dopplers +RLE femoral DVT; s/p hep gtt and now transitioned to eliquis. Febrile on arrival with UA+ s/p CTX, however UCx with ESBL E coli/proteus, now on ertapenem. VQ scan neg, US KUB with mod R hydro and bladder distention, and TTE neg. Course c/b urinary retention for which ayala was placed 5/30. Palliative and rad-onc following. PT recs JESSICA -- pt in agreement.
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 Found to have elevated d-dimer and IRENE on CKD, now improving. D-dimer continues to rise, for which hematology is now consulted --> repeat LE dopplers +RLE femoral DVT; s/p hep gtt and now transitioned to eliquis. Febrile on arrival with UA+ s/p CTX, however UCx with ESBL E coli/proteus, now on ertapenem. VQ scan neg, US KUB with mod R hydro and bladder distention, and TTE neg. Course c/b urinary retention for which ayala was placed 5/30. Palliative and rad-onc following. PT recs JESSICA -- pt in agreement.
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 Found to have elevated d-dimer and IRENE on CKD, now improving. Febrile on arrival with UA+ on CTX. LE dopplers neg, VQ scan neg, US KUB with mod R hydro and bladder distention. Pending TTE. Palliative and rad-onc following. 
79F with PMHx CKD (2/2 R hydronephrosis from obstructing pelvic mass), Chronic RLE edema since 9/2023, HTN, HLD, goiter, pelvic mass with mets initially dx 7/2023 with LN bx 11/2023 with SCC vs urothelial carcinoma (no treatment, pending patient/family decision on RT) presenting with chest pain 5/27 AM admitted for c/f possible PE, on heparin gtt. Palliative consulted for goals of care.       
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 Found to have elevated d-dimer and IRENE on CKD, now improving. D-dimer continues to rise, for which hematology is now consulted --> repeat LE dopplers +RLE femoral DVT; s/p hep gtt and now transitioned to eliquis. Febrile on arrival with UA+ s/p CTX, however UCx with ESBL E coli/proteus, now on ertapenem. VQ scan neg, US KUB with mod R hydro and bladder distention, and TTE neg. Course c/b urinary retention for which ayala was placed 5/30. Palliative and rad-onc following. PT recs JESSICA -- pt in agreement.
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 Found to have elevated d-dimer and IRENE on CKD, now improving. D-dimer continues to rise, for which hematology is now consulted --> repeat LE dopplers +RLE femoral DVT; s/p hep gtt and now transitioned to eliquis. Febrile on arrival with UA+ s/p CTX, however UCx with ESBL E coli/proteus, now on ertapenem. VQ scan neg, US KUB with mod R hydro and bladder distention, and TTE neg. Course c/b urinary retention for which ayala was placed 5/30. Palliative and rad-onc following. PT recs JESSICA -- pt in agreement.
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 Found to have elevated d-dimer and IRENE on CKD, now improving. D-dimer continues to rise, for which hematology is now consulted --> repeat LE dopplers +RLE femoral DVT; s/p hep gtt and now transitioned to eliquis. Febrile on arrival with UA+ s/p CTX, however UCx with ESBL E coli/proteus, now on ertapenem. VQ scan neg, US KUB with mod R hydro and bladder distention, and TTE neg. Course c/b urinary retention for which ayala was placed 5/30. Palliative and rad-onc following. PT recs JESSICA -- pt in agreement.

## 2024-06-04 NOTE — PROGRESS NOTE ADULT - PROBLEM SELECTOR PROBLEM 3
Malignancy
Acute UTI
SOB (shortness of breath)
Positive D dimer
Acute UTI
Acute UTI

## 2024-06-04 NOTE — PROGRESS NOTE ADULT - PROBLEM SELECTOR PLAN 2
- d-dimer 5084 on arrival, continues to uptrend -- 12K 5/30 --> will not continue to trend  - inc RLE edema / chronic   - high risk for DVT/PE given malignancy  - initial LE dopplers neg --> 5/30 repeat LE dopplers with +R femoral DVT  - VQ scan low probability of PE   - was on hep gtt --> now transitioned to eliquis 10mg BID x7d, 5mg BID indefinitely   - heme consulted, in agreement
Also, R Hydronephrosis  Cr 2.2 on arrival  US KUB with mod R hydro and bladder distention despite voiding   Now Cr downtrending   Haro placement
- pt p/w CP and SOB  - meeting SIRS criteria on arrival as below  - EKG NSR without ischemic changes  - d-dimer 5084, trop neg x2, , TSH WNL, lactate neg  - low c/f ACS  - CXR neg  - VQ scan neg as below  - TTE WNL  - symptoms have improved  - comfortable on RA
Pt endorses pain in the lower extremity with elevated d-dimer  US DVT LE: acute thrombosis in the right femoral vein and a chronic thrombus noted at the saphenofemoral junction  Hematology consulted for elevated d-dimer: recommended AC given acute finding of DVT --> Started on heparin gtt--> transitioned to eliquis
- pt p/w CP and SOB  - meeting SIRS criteria on arrival as below  - EKG NSR without ischemic changes  - d-dimer 5084, trop neg x2, , TSH WNL, lactate neg  - low c/f ACS  - CXR neg  - VQ scan neg as below  - TTE WNL  - symptoms have improved  - comfortable on RA
- d-dimer 5084 on arrival, repeat 5848  - continue to trend  - inc RLE edema / chronic   - high risk for DVT/PE given malignancy  - LE dopplers neg  - VQ scan low probability of PE   - was on hep gtt --> now transitioned to ppx dosing
- d-dimer 5084 on arrival, continues to uptrend -- 12K today  - continue to trend  - inc RLE edema / chronic   - high risk for DVT/PE given malignancy  - LE dopplers neg  - VQ scan low probability of PE   - was on hep gtt --> now transitioned to ppx dosing  - ?benefit to ppx dosing on DC --> heme consulted for recs

## 2024-06-04 NOTE — CONSULT NOTE ADULT - SUBJECTIVE AND OBJECTIVE BOX
HPI  79F with PMHx CKD (2/2 R hydronephrosis from obstructing pelvic mass), Chronic RLE edema since 9/2023, HTN, HLD, goiter, pelvic mass with mets initially dx 7/2023 with LN bx 11/2023 with SCC vs urothelial carcinoma (no treatment, pending patient/family decision on RT) presenting with chest pain 5/27 AM. Patient denies fevers, chills, cough, abdominal pain, vomiting, diarrhea, dysuria, hematuria. Per daughter she has dark concentrated urine and some odor.    Urology consulted for R sided hydronephrosis 2/2 obstructing pelvic mass.    Patient was seen by urology in 7/2023 where patient had similar R sided hydronephrosis 2/2 obstructing pelvic mass with CTAP on 7/25/2023 showing at the time a 12 cm cystic structure in the pelvis which appears to be a right bladder diverticulum which is located superior to the urinary bladder and compresses the urinary bladder and appears to obstruct the right ureter causing right hydronephrosis and right hydroureter. A renal lasix scan was performed on 8/8/2023 showing a differential renal function: Right kidney: 4%; Left kidney: 96%. Urology was contacted today regarding R sided hydronephrosis found on 6/3/2024 CTAP demonstrating severe right hydroureteronephrosis with cortical atrophy, unchanged. Of note, CTAP was reviewed and it appears the ayala catheter balloon is inflated in the uterus with bladder remains very distended with urine. Patient is currently AF, /58, HR 94, 98% on RA, WBC 12.6, Hct 25.5, Cr 1.35 (from 1.43, which patient was discharged with during 7/2023 visit). Bcx negative, Ucx +10-49k ESBL E coli and Proteus both S> Ertapenem, which patient is on.      PAST MEDICAL & SURGICAL HISTORY:  Essential hypertension      Hyperlipidemia      Thyroid goiter      T2DM (type 2 diabetes mellitus)      CKD (chronic kidney disease)      Edema of right lower leg      Pelvic mass      H/O: hysterectomy      H/O lumpectomy      Bladder diverticulum      S/P cystoscopy          MEDICATIONS  (STANDING):  apixaban 10 milliGRAM(s) Oral every 12 hours  ertapenem  IVPB 500 milliGRAM(s) IV Intermittent every 24 hours  lidocaine   4% Patch 1 Patch Transdermal daily  melatonin 3 milliGRAM(s) Oral at bedtime  senna 2 Tablet(s) Oral at bedtime  sodium chloride 3 Gram(s) Oral three times a day    MEDICATIONS  (PRN):  acetaminophen     Tablet .. 650 milliGRAM(s) Oral every 6 hours PRN Temp greater or equal to 38C (100.4F), Mild Pain (1 - 3)      FAMILY HISTORY:  No pertinent family history in first degree relatives        Allergies    No Known Allergies    Intolerances        SOCIAL HISTORY:    REVIEW OF SYSTEMS: Otherwise negative as stated in HPI    Physical Exam  Vital signs  T(C): 37.1 (06-04-24 @ 05:49), Max: 37.6 (06-03-24 @ 21:10)  HR: 94 (06-04-24 @ 05:49)  BP: 120/58 (06-04-24 @ 05:49)  SpO2: 98% (06-04-24 @ 05:49)  Wt(kg): --    Output    OUT:    Voided (mL): 600 mL  Total OUT: 600 mL    Total NET: -600 mL          Gen: NAD  Pulm: No respiratory distress	  CV: RRR  GI: S/ND/NT  :   MSK: moves all 4 limbs spontaneously    LABS:      06-04 @ 05:30    WBC 12.57 / Hct 25.5  / SCr 1.35     06-03 @ 07:12    WBC 14.56 / Hct 25.1  / SCr 1.44     06-04    132<L>  |  99  |  34<H>  ----------------------------<  89  4.5   |  20<L>  |  1.35<H>    Ca    8.7      04 Jun 2024 05:30  Phos  3.5     06-04  Mg     2.20     06-04        Urinalysis Basic - ( 04 Jun 2024 05:30 )    Color: x / Appearance: x / SG: x / pH: x  Gluc: 89 mg/dL / Ketone: x  / Bili: x / Urobili: x   Blood: x / Protein: x / Nitrite: x   Leuk Esterase: x / RBC: x / WBC x   Sq Epi: x / Non Sq Epi: x / Bacteria: x            Urine Cx:    Blood Cx:    RADIOLOGY:  ACC: [id number] EXAM: CT ABDOMEN AND PELVIS ORDERED BY: JULIO LUCIO    PROCEDURE DATE: 06/03/2024        INTERPRETATION: CLINICAL INFORMATION: Pelvic mass with right hydronephrosis on renal ultrasound. UTI.    COMPARISON: CT abdomen and pelvis 10/17/2023, renal ultrasound 5/28/2024. MRI pelvis 32,024.    CONTRAST/COMPLICATIONS:  IV Contrast: None  Oral Contrast: None  Complications: None    PROCEDURE:  CT of the Abdomen and Pelvis was performed.  Sagittal and coronal reformats were performed.    FINDINGS:  Limited evaluation of the vasculature and solid organs in the absence of intravenous contrast.    LOWER CHEST: Trace right pleural effusion. Right basilar partial atelectasis atelectasis.    LIVER: Small hepatic cysts and subcentimeter hypodensities too small to characterize.  BILE DUCTS: Normal caliber.  GALLBLADDER: Within normal limits.  SPLEEN: Within normal limits.  PANCREAS: Within normal limits.  ADRENALS: Within normal limits.  KIDNEYS/URETERS: Severe hydroureteronephrosis with cortical atrophy, unchanged.    BLADDER: Ayala catheter.  REPRODUCTIVE ORGANS: Soft tissue lesion in the region of the cervix, approximately measuring 4.2 x 4.0 cm (2, 107), without significant change. Redemonstrated thick-walled cystic mass with new air-fluid level, contiguous with the cervix, approximately measuring 12.4 x 11.2 cm. This has the appearance of distended fluid and air-filled uterine cavity.    BOWEL: No bowel obstruction. Appendix is normal.  PERITONEUM: No ascites.  VESSELS: Atherosclerotic changes.  RETROPERITONEUM/LYMPH NODES: Redemonstrated retroperitoneal and pelvic lymphadenopathy, which is not significantly changed. For reference, a 1.5 x 1.0 cm aortocaval lymph node (2-65), a 3.9 x 2.1 cm right pelvic sidewall lymph node (2-109) and a 2.3 x 1.5 cm left pelvic sidewall lymph node (2-104).  ABDOMINAL WALL: Within normal limits.  BONES: Grade 1 anterolisthesis of L4 on L5.    IMPRESSION:    Soft tissue lesion in the region of the cervix with resultant severe right hydroureteronephrosis, without significant change. Redemonstrated thick-walled cystic mass with new air-fluid level, contiguous with the cervix. This has the appearance of distended fluid and air filled uterine cavity.    Retroperitoneal and pelvic lymphadenopathy, without significant change.    --- End of Report ---    ACC: [id number] EXAM: NM KIDNEY IMG LASIX ORDERED BY: MICHAEL HICKS DATE: 08/08/2023        INTERPRETATION: RADIOPHARMACEUTICAL: 10.4 mCi [id number] (MAG3), I.V.    CLINICAL INFORMATION: 78 year old female with right hydronephrosis; referred for evaluation of function and obstruction.    TECHNIQUE: The patient received approximately 600 cc normal saline I.V. over about one hour prior to radiopharmaceutical injection. A 14 [place] Ayala catheter was inserted intravesically using aseptic technique. Dynamic images of the posterior abdomen were obtained for 45 minutes following administration of radiopharmaceutical. Furosemide 40 mg I.V. was administered at 22 minutes postinjection.    COMPARISON: No previous renal scans were available for comparison.    FINDINGS: The renal perfusion images demonstrate prompt, good flow to the left kidney. The functional images show good cortical uptake of radiopharmaceutical by the left kidney. There is prompt appearance of activity in the left collecting system by 4 minutes, followed by spontaneous drainage prior to diuretic challenge.    There is markedly decreased flow and cortical uptake of radiopharmaceutical by the right kidney. The right collecting system is never visualized.    Differential renal function: Right kidney: 4% ; Left kidney: 96%.    IMPRESSION: Diuretic renal scan demonstrates:      Minimal flow to and function of the right kidney. Function is so poor that it is not possible to determine the likelihood of obstruction.    Normal flow to and function of the left kidney with no evidence of obstruction.    Differential renal function: Right kidney: 4%; Left kidney: 96%.    --- End of Report ---      PROCEDURE DATE: 07/25/2023        INTERPRETATION: CLINICAL INFORMATION: Right hydronephrosis    COMPARISON: Kidneys and bladder ultrasound 7/2/2023    CONTRAST/COMPLICATIONS:  IV Contrast: NONE  Oral Contrast: NONE  Complications: None reported at time of study completion    PROCEDURE:  CT of the Abdomen and Pelvis was performed.  Sagittal and coronal reformats were performed.    FINDINGS:  LOWER CHEST: Within normal limits.    LIVER: Within normal limits.  BILE DUCTS: Normal caliber.  GALLBLADDER: Within normal limits.  SPLEEN: Within normal limits.  PANCREAS: Within normal limits.  ADRENALS: Within normal limits.  KIDNEYS/URETERS: There is severe right hydronephrosis with cortical thinning of the right kidney and right hydroureter as well. This appears to be caused by a 12 cm cystic structure in the pelvis which appears to be a right bladder diverticulum which is positioned superior to the urinary bladder and is compressing the urinary bladder and is probably obstructing the right ureter. The urinary bladder itself contains a small amount of urine and the left kidney appears normal.    REPRODUCTIVE ORGANS: Uterus is not clearly identified and may have been surgically removed    BOWEL: No bowel obstruction. Appendix is normal.  PERITONEUM: No ascites.  VESSELS: Within normal limits.  RETROPERITONEUM/LYMPH NODES: No lymphadenopathy.  ABDOMINAL WALL: Within normal limits.  BONES: Within normal limits.    IMPRESSION:  12 cm cystic structure in the pelvis which appears to be a right bladder diverticulum which is located superior to the urinary bladder and compresses the urinary bladder and appears to obstruct the right ureter causing right hydronephrosis and right hydroureter.        --- End of Report ---   HPI  79F with PMHx CKD (2/2 R hydronephrosis from obstructing pelvic mass), Chronic RLE edema since 9/2023, HTN, HLD, goiter, pelvic mass with mets initially dx 7/2023 with LN bx 11/2023 with SCC vs urothelial carcinoma (no treatment, pending patient/family decision on RT) presenting with chest pain 5/27 AM. Patient denies fevers, chills, cough, abdominal pain, vomiting, diarrhea, dysuria, hematuria. Per daughter she has dark concentrated urine and some odor.    Urology consulted for R sided hydronephrosis 2/2 obstructing pelvic mass.    Patient was seen by urology in 7/2023 where patient had similar R sided hydronephrosis 2/2 obstructing pelvic mass with CTAP on 7/25/2023 showing at the time a 12 cm cystic structure in the pelvis which appears to be a right bladder diverticulum which is located superior to the urinary bladder and compresses the urinary bladder and appears to obstruct the right ureter causing right hydronephrosis and right hydroureter. A renal lasix scan was performed on 8/8/2023 showing a differential renal function: Right kidney: 4%; Left kidney: 96%. Urology was contacted today regarding R sided hydronephrosis found on 6/3/2024 CTAP demonstrating severe right hydroureteronephrosis with cortical atrophy, unchanged. Of note, CTAP was reviewed and it appears the ayala catheter balloon is inflated in the uterus with bladder remains very distended with urine. Patient is currently AF, /58, HR 94, 98% on RA, WBC 12.6, Hct 25.5, Cr 1.35 (from 1.43, which patient was discharged with during 7/2023 visit). Bcx negative, Ucx +10-49k ESBL E coli and Proteus both S> Ertapenem, which patient is on. Patient evaluated and has no complaints, denies flank pain, hematuria, fevers/ chills/ nausea/ vomiting.      PAST MEDICAL & SURGICAL HISTORY:  Essential hypertension      Hyperlipidemia      Thyroid goiter      T2DM (type 2 diabetes mellitus)      CKD (chronic kidney disease)      Edema of right lower leg      Pelvic mass      H/O: hysterectomy      H/O lumpectomy      Bladder diverticulum      S/P cystoscopy          MEDICATIONS  (STANDING):  apixaban 10 milliGRAM(s) Oral every 12 hours  ertapenem  IVPB 500 milliGRAM(s) IV Intermittent every 24 hours  lidocaine   4% Patch 1 Patch Transdermal daily  melatonin 3 milliGRAM(s) Oral at bedtime  senna 2 Tablet(s) Oral at bedtime  sodium chloride 3 Gram(s) Oral three times a day    MEDICATIONS  (PRN):  acetaminophen     Tablet .. 650 milliGRAM(s) Oral every 6 hours PRN Temp greater or equal to 38C (100.4F), Mild Pain (1 - 3)      FAMILY HISTORY:  No pertinent family history in first degree relatives        Allergies    No Known Allergies    Intolerances        SOCIAL HISTORY:    REVIEW OF SYSTEMS: Otherwise negative as stated in HPI    Physical Exam  Vital signs  T(C): 37.1 (06-04-24 @ 05:49), Max: 37.6 (06-03-24 @ 21:10)  HR: 94 (06-04-24 @ 05:49)  BP: 120/58 (06-04-24 @ 05:49)  SpO2: 98% (06-04-24 @ 05:49)  Wt(kg): --    Output    OUT:    Voided (mL): 600 mL  Total OUT: 600 mL    Total NET: -600 mL          Gen: NAD  Pulm: No respiratory distress	  CV: RRR  GI: S/ND/NT  : ayala replaced this AM, secured, now draining clear yellow urine  MSK: moves all 4 limbs spontaneously    LABS:      06-04 @ 05:30    WBC 12.57 / Hct 25.5  / SCr 1.35     06-03 @ 07:12    WBC 14.56 / Hct 25.1  / SCr 1.44     06-04    132<L>  |  99  |  34<H>  ----------------------------<  89  4.5   |  20<L>  |  1.35<H>    Ca    8.7      04 Jun 2024 05:30  Phos  3.5     06-04  Mg     2.20     06-04        Urinalysis Basic - ( 04 Jun 2024 05:30 )    Color: x / Appearance: x / SG: x / pH: x  Gluc: 89 mg/dL / Ketone: x  / Bili: x / Urobili: x   Blood: x / Protein: x / Nitrite: x   Leuk Esterase: x / RBC: x / WBC x   Sq Epi: x / Non Sq Epi: x / Bacteria: x            Urine Cx:    Blood Cx:    RADIOLOGY:  ACC: [id number] EXAM: CT ABDOMEN AND PELVIS ORDERED BY: JULIOCORAZON LUCIO    PROCEDURE DATE: 06/03/2024        INTERPRETATION: CLINICAL INFORMATION: Pelvic mass with right hydronephrosis on renal ultrasound. UTI.    COMPARISON: CT abdomen and pelvis 10/17/2023, renal ultrasound 5/28/2024. MRI pelvis 32,024.    CONTRAST/COMPLICATIONS:  IV Contrast: None  Oral Contrast: None  Complications: None    PROCEDURE:  CT of the Abdomen and Pelvis was performed.  Sagittal and coronal reformats were performed.    FINDINGS:  Limited evaluation of the vasculature and solid organs in the absence of intravenous contrast.    LOWER CHEST: Trace right pleural effusion. Right basilar partial atelectasis atelectasis.    LIVER: Small hepatic cysts and subcentimeter hypodensities too small to characterize.  BILE DUCTS: Normal caliber.  GALLBLADDER: Within normal limits.  SPLEEN: Within normal limits.  PANCREAS: Within normal limits.  ADRENALS: Within normal limits.  KIDNEYS/URETERS: Severe hydroureteronephrosis with cortical atrophy, unchanged.    BLADDER: Ayala catheter.  REPRODUCTIVE ORGANS: Soft tissue lesion in the region of the cervix, approximately measuring 4.2 x 4.0 cm (2, 107), without significant change. Redemonstrated thick-walled cystic mass with new air-fluid level, contiguous with the cervix, approximately measuring 12.4 x 11.2 cm. This has the appearance of distended fluid and air-filled uterine cavity.    BOWEL: No bowel obstruction. Appendix is normal.  PERITONEUM: No ascites.  VESSELS: Atherosclerotic changes.  RETROPERITONEUM/LYMPH NODES: Redemonstrated retroperitoneal and pelvic lymphadenopathy, which is not significantly changed. For reference, a 1.5 x 1.0 cm aortocaval lymph node (2-65), a 3.9 x 2.1 cm right pelvic sidewall lymph node (2-109) and a 2.3 x 1.5 cm left pelvic sidewall lymph node (2-104).  ABDOMINAL WALL: Within normal limits.  BONES: Grade 1 anterolisthesis of L4 on L5.    IMPRESSION:    Soft tissue lesion in the region of the cervix with resultant severe right hydroureteronephrosis, without significant change. Redemonstrated thick-walled cystic mass with new air-fluid level, contiguous with the cervix. This has the appearance of distended fluid and air filled uterine cavity.    Retroperitoneal and pelvic lymphadenopathy, without significant change.    --- End of Report ---    ACC: [id number] EXAM: NM KIDNEY IMG LASIX ORDERED BY: MICHAEL BETTS    PROCEDURE DATE: 08/08/2023        INTERPRETATION: RADIOPHARMACEUTICAL: 10.4 mCi [id number] (MAG3), I.V.    CLINICAL INFORMATION: 78 year old female with right hydronephrosis; referred for evaluation of function and obstruction.    TECHNIQUE: The patient received approximately 600 cc normal saline I.V. over about one hour prior to radiopharmaceutical injection. A 14 [place] Ayala catheter was inserted intravesically using aseptic technique. Dynamic images of the posterior abdomen were obtained for 45 minutes following administration of radiopharmaceutical. Furosemide 40 mg I.V. was administered at 22 minutes postinjection.    COMPARISON: No previous renal scans were available for comparison.    FINDINGS: The renal perfusion images demonstrate prompt, good flow to the left kidney. The functional images show good cortical uptake of radiopharmaceutical by the left kidney. There is prompt appearance of activity in the left collecting system by 4 minutes, followed by spontaneous drainage prior to diuretic challenge.    There is markedly decreased flow and cortical uptake of radiopharmaceutical by the right kidney. The right collecting system is never visualized.    Differential renal function: Right kidney: 4% ; Left kidney: 96%.    IMPRESSION: Diuretic renal scan demonstrates:      Minimal flow to and function of the right kidney. Function is so poor that it is not possible to determine the likelihood of obstruction.    Normal flow to and function of the left kidney with no evidence of obstruction.    Differential renal function: Right kidney: 4%; Left kidney: 96%.    --- End of Report ---      PROCEDURE DATE: 07/25/2023        INTERPRETATION: CLINICAL INFORMATION: Right hydronephrosis    COMPARISON: Kidneys and bladder ultrasound 7/2/2023    CONTRAST/COMPLICATIONS:  IV Contrast: NONE  Oral Contrast: NONE  Complications: None reported at time of study completion    PROCEDURE:  CT of the Abdomen and Pelvis was performed.  Sagittal and coronal reformats were performed.    FINDINGS:  LOWER CHEST: Within normal limits.    LIVER: Within normal limits.  BILE DUCTS: Normal caliber.  GALLBLADDER: Within normal limits.  SPLEEN: Within normal limits.  PANCREAS: Within normal limits.  ADRENALS: Within normal limits.  KIDNEYS/URETERS: There is severe right hydronephrosis with cortical thinning of the right kidney and right hydroureter as well. This appears to be caused by a 12 cm cystic structure in the pelvis which appears to be a right bladder diverticulum which is positioned superior to the urinary bladder and is compressing the urinary bladder and is probably obstructing the right ureter. The urinary bladder itself contains a small amount of urine and the left kidney appears normal.    REPRODUCTIVE ORGANS: Uterus is not clearly identified and may have been surgically removed    BOWEL: No bowel obstruction. Appendix is normal.  PERITONEUM: No ascites.  VESSELS: Within normal limits.  RETROPERITONEUM/LYMPH NODES: No lymphadenopathy.  ABDOMINAL WALL: Within normal limits.  BONES: Within normal limits.    IMPRESSION:  12 cm cystic structure in the pelvis which appears to be a right bladder diverticulum which is located superior to the urinary bladder and compresses the urinary bladder and appears to obstruct the right ureter causing right hydronephrosis and right hydroureter.        --- End of Report ---

## 2024-06-04 NOTE — PROGRESS NOTE ADULT - PROBLEM SELECTOR PROBLEM 6
Transaminitis
Transaminitis
Encounter for palliative care
Macrocytic anemia
Macrocytic anemia
Advanced care planning/counseling discussion
Macrocytic anemia
Macrocytic anemia
Transaminitis

## 2024-06-04 NOTE — PROGRESS NOTE ADULT - PROBLEM SELECTOR PLAN 4
Also, R Hydronephrosis  Cr 2.2 on arrival  US KUB with mod R hydro and bladder distention despite voiding   Now Cr downtrending   Haro placement
- pt c/o abd pain, urinary incont, and urinary frequency  - on arrival meeting SIRS criteria with likely urinary source --> sepsis  - lactate neg  - UA+ with UCx now growing ESBL E coli and proteus  - s/p CTX x3d  - start ertapenem 5/30 -6/1  for 3d course, low grade fever, if Fever >101 , repeat BC, UC   - WBC uptrending 14.5, restart Ertapenem (6/3--)  - Renal US mod R hydronephrosis, check CT Abd/pelvis woc , if persistent R hydro then consult urology, may need stent or NT  - c/b urinary retention requiring ayala placement
- pt c/o abd pain, urinary incont, and urinary frequency  - on arrival meeting SIRS criteria with likely urinary source --> sepsis  - lactate neg  - UA+ with UCx now growing ESBL E coli and proteus  - s/p CTX x3d  - start ertapenem 5/30 -- planned for 3d course, low grade fever, if Fver >101 , repeat BC, UC   - c/b urinary retention requiring ayala placement
- Cr 2.02 on arrival; baseline 1.5 from chart review  - downtrending   - UA+ with UCx in lab, as above  - US KUB with mod R hydro and bladder distention despite voiding   - monitor BS q8h and PVR --> retaining urine and s/p SC x3, will need ayala 5/30
- Cr 2.02 on arrival; baseline 1.5 from chart review  - downtrending   - UA+ with UCx in lab, as above  - US KUB with mod R hydro and bladder distention despite voiding
- pt c/o abd pain, urinary incont, and urinary frequency  - on arrival meeting SIRS criteria with likely urinary source --> sepsis  - lactate neg  - UA+ with UCx now growing ESBL E coli and proteus  - s/p CTX x3d  - start ertapenem 5/30 -- planned for 3d course, low grade fever, if Fever >101 , repeat BC, UC   - c/b urinary retention requiring ayala placement
- pt c/o abd pain, urinary incont, and urinary frequency  - on arrival meeting SIRS criteria with likely urinary source --> sepsis  - lactate neg  - UA+ with UCx now growing ESBL E coli and proteus  - s/p CTX x3d  - start ertapenem 5/30 -6/1  for 3d course, low grade fever, if Fever >101 , repeat BC, UC   - WBC uptrending 14.5, restart Ertapenem (6/3--), #5 on 6/4  - Renal US mod R hydronephrosis, CT A/p (6/3) reviewed pelvic mass causing severe R hydro.  Soft tissue lesion in the region of the cervixwith resultant severe  right hydroureteronephrosis, without significant change.   Case d/w urology resident Dr. Brody, previous renal scan demonstrated 4% right kidney function, 96% left kidney function, essentially Lt working kidney, no intervention wouldn't improve overall renal fxn, bladder distention on CT, Haro malpositioned, Haro replaced this morning.   Per , no acute urological intervention, no indication for repeat renal scan or NT by IR,  cleared for DC with outpt f/up.   - maintain Haro for urinary retention, outpt f/up Urology
Currently on Abx with Ertapanem
- Cr 2.02 on arrival; baseline 1.5 from chart review  - downtrending, 1.52 today  - UA+ with UCx in lab, as above  - US KUB with mod R hydro and bladder distention despite voiding   - monitor BS q8h and PVR

## 2024-06-04 NOTE — PROGRESS NOTE ADULT - PROBLEM SELECTOR PLAN 5
- Cr 2.02 on arrival; baseline 1.5 from chart review  - downtrending , now plateau   - UA+ with UCx in lab, as above  - US KUB with mod R hydro and bladder distention despite voiding
- Cr 2.02 on arrival; baseline 1.5 from chart review  - downtrending , now plateau   - UA+ with UCx in lab, as above  - US KUB with mod R hydro and bladder distention despite voiding
- Hb 8.6 with MCV 92  - no active s/s bleeding  - likely in setting of malignancy  - outpt f/u
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
Currently on Abx with Ertapanem
- Cr 2.02 on arrival; baseline 1.5 from chart review  - downtrending , now plateau   - UA+ with UCx in lab, as above  - US KUB with mod R hydro and bladder distention despite voiding  - Maintain Haro
- Hb 8.6 with MCV 92  - no active s/s bleeding  - likely in setting of malignancy  - outpt f/u
- Cr 2.02 on arrival; baseline 1.5 from chart review  - downtrending , now plateau   - UA+ with UCx in lab, as above  - US KUB with mod R hydro and bladder distention despite voiding
- Hb 8.6 with MCV 92  - no active s/s bleeding  - likely in setting of malignancy  - outpt f/u

## 2024-06-04 NOTE — PROGRESS NOTE ADULT - PROBLEM SELECTOR PLAN 8
- US KUB with mod R hydro, BW distended even post void  - urinary retention requiring ayala as above  - palliative following, appreciate recs   - likely contributing to IRENE as above
- US KUB with mod R hydro, BW distended even post void  - urinary retention requiring ayala as above  - palliative following, appreciate recs   - likely contributing to IRENE as above
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 Goldberg at [facility] and Dr. Ling rad-onc, has yet to pursue RT and is "considering it"  - rad-onc consulted, appreciate recs --> to f/u with outpt Dr. Ling 6/7 10am -- pt in agreement  - palliative following, appreciate recs  - pt states malignancy does not impact her daily living/QOL and she is unsure of the benefits of raditation / need for treatment, would like to discuss further   - outpt onc f/u  - outpt gyn-onc f/u
- US KUB with mod R hydro, BW distended even post void  - urinary retention requiring ayala as above  - palliative following, appreciate recs   - likely contributing to IRENE as above
- US KUB with mod R hydro, BW distended even post void  - urinary retention requiring ayala as above  - palliative following, appreciate recs   - likely contributing to IRENE as above
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 Goldberg at [facility] and Dr. Ling rad-onc, has yet to pursue RT and is "considering it"  - rad-onc consulted, appreciate recs --> to f/u with outpt Dr. Ling 6/7 10am -- pt in agreement  - palliative following, appreciate recs  - pt states malignancy does not impact her daily living/QOL and she is unsure of the benefits of raditation / need for treatment, would like to discuss further   - outpt onc f/u
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 Goldberg at [facility] and Dr. Ling rad-onc, has yet to pursue RT and is "considering it"  - rad-onc consulted today, appreciate recs  - palliative following, appreciate recs  - pt states malignancy does not impact her daily living/QOL and she is unsure of the benefits of raditation / need for treatment, would like to discuss further   - outpt onc f/u

## 2024-06-07 ENCOUNTER — APPOINTMENT (OUTPATIENT)
Dept: RADIATION ONCOLOGY | Facility: CLINIC | Age: 79
End: 2024-06-07
Payer: MEDICARE

## 2024-06-07 VITALS
SYSTOLIC BLOOD PRESSURE: 107 MMHG | DIASTOLIC BLOOD PRESSURE: 63 MMHG | HEART RATE: 90 BPM | RESPIRATION RATE: 16 BRPM | BODY MASS INDEX: 24.17 KG/M2 | TEMPERATURE: 96.98 F | OXYGEN SATURATION: 99 % | WEIGHT: 128 LBS | HEIGHT: 61 IN

## 2024-06-07 DIAGNOSIS — R19.00 INTRA-ABDOMINAL AND PELVIC SWELLING, MASS AND LUMP, UNSPECIFIED SITE: ICD-10-CM

## 2024-06-07 DIAGNOSIS — C80.1 MALIGNANT (PRIMARY) NEOPLASM, UNSPECIFIED: ICD-10-CM

## 2024-06-07 PROCEDURE — 99215 OFFICE O/P EST HI 40 MIN: CPT

## 2024-06-07 NOTE — VITALS
[Pain Duration: ___] : Pain duration: [unfilled] [Pain Location: ___] : Pain Location: [unfilled] [70: Cares for self; unalbe to carry on normal activity or do active work.] : 70: Cares for self; unable to carry on normal activity or do active work. [1 - Distress Level] : Distress Level: 1 [Maximal Pain Intensity: 9/10] : 9/10 [Least Pain Intensity: 5/10] : 5/10 [Pain Description/Quality: ___] : Pain description/quality: [unfilled] [OTC] : OTC

## 2024-06-07 NOTE — REVIEW OF SYSTEMS
[Lower Ext Edema] : lower extremity edema [Constipation] : constipation [Joint Pain] : joint pain [Negative] : Allergic/Immunologic [Gait Disturbance] : gait disturbance [Difficulty Walking] : difficulty walking

## 2024-06-12 PROBLEM — R19.00 PELVIC MASS: Status: ACTIVE | Noted: 2023-11-01

## 2024-06-12 PROBLEM — C80.1 CARCINOMA: Status: ACTIVE | Noted: 2023-12-04

## 2024-06-12 NOTE — DISEASE MANAGEMENT
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

## 2024-06-12 NOTE — PHYSICAL EXAM
[] : no respiratory distress [Abdomen Soft] : soft [Normal] : oriented to person, place and time, the affect was normal, the mood was normal and not anxious [de-identified] : bilateral lower leg edema rt>left

## 2024-06-12 NOTE — HISTORY OF PRESENT ILLNESS
[FreeTextEntry1] : NICKIE RUSSELL is a 79 year old F with a pelvic mass. She was seen in our office Feb 2024 for an initial consultation, now returns for a reevaluation.   HPI is as follows She presented to ED s/p mechanical fall on 6/29/23. At this time, patient was noted to have an IRENE and urinary retention, with pelvic mass noted on imaging. Urology workup concerning for 12cm diverticulum and non-functioning left kidney. CT A/P on 7/25/23: 12 cm cystic structure in the pelvis which appears to be a right bladder diverticulum which is located superior to the urinary bladder and compresses the urinary bladder and appears to obstruct the right ureter causing right hydronephrosis and right hydroureter.  In October, 2023, pt noted swelling of RLE and was referred to the ED.  CT A/P on 10/17/23 demonstrated 17cm pelvic mass. Increase in size of thick-walled, fluid distended pelvic structure. Pelvic and periaortic lymphadenopathy, concerning for pelvic malignancy.   Pt underwent CT guided right pelvic lymph node biopsy on 11/9/23; pathology: SCC vs urothelial carcinoma.   On 1/5/24, pt underwent cysto, R ureteroscopy, retrograde pyelogram, vaginoscopy. No evidence of bladder diverticulum, and no connection to mass visualized in collecting system. Urine negative for high grade urothelial carcinoma. Patient discussed at  tumor board, believed to be gynecologic in origin.  Pelvic MR on 1/30/24: S/p hysterectomy per history. Complex solid and cystic structure 19cm w/ rind of soft tissue measuring up to 1.7cm in thickness w/ enhancement and diffusion restriction abutting/contiguous with vaginal cuff. Previously measuring 17cm. Multiple enlarged centrally necrotic pelvic LAD. Stable severe R hydronephrosis 2/2 mass effect.  Pt discussed at GYN ONC Tumor Board on 1/17/24. 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 Plan for pelvic RT, possible cis/RT pending kidney function/tolerability.  At her initial consultation in Feb 2024 with us, she was feeling well, main complaint is right knee pain and shoulder pain. She has no abdominal or pelvic pain, no vaginal bleeding, no urinary complaints, constipation managed with laxatives.  At the time, she had no direct symptom for palliative radiation. She was sent for a dopplers to r/o DVT which was negative.   She returns today for to discuss radiation options. Recently she was admitted to [facility] , found to have R leg DVT, now managed with Eliquis. Currently at rehab in [place] to help with ambulation. Has right leg knee pain, unchanged from baseline. She denies any abd/pelvic pain. No urinary complaints. Baseline constipation managed with laxatives. No vaginal bleeding or discharge

## 2024-06-26 NOTE — DISCHARGE NOTE PROVIDER - POSTFACE STATEMENT FOR MINUTES SPENT
2nd Quarterly Reminder sent to patient for the DM Program - See Mychart message or Letter for more information.    Fany Jaffe CphT  [facility]  Clinical Pharmacy   Department, toll free: [phone] Option #3    For Pharmacy Admin Tracking Only    Program: [facility]  CPA in place:  No  Gap Closed?: Yes   Time Spent (min): 5    
minutes on the discharge service.

## 2024-07-01 ENCOUNTER — APPOINTMENT (OUTPATIENT)
Dept: UROLOGY | Facility: HOSPITAL | Age: 79
End: 2024-07-01

## 2024-07-01 ENCOUNTER — OUTPATIENT (OUTPATIENT)
Dept: OUTPATIENT SERVICES | Facility: HOSPITAL | Age: 79
LOS: 1 days | End: 2024-07-01
Payer: MEDICARE

## 2024-07-01 VITALS
TEMPERATURE: 99.14 F | HEART RATE: 103 BPM | SYSTOLIC BLOOD PRESSURE: 112 MMHG | RESPIRATION RATE: 20 BRPM | OXYGEN SATURATION: 96 % | DIASTOLIC BLOOD PRESSURE: 70 MMHG

## 2024-07-01 DIAGNOSIS — N13.30 UNSPECIFIED HYDRONEPHROSIS: ICD-10-CM

## 2024-07-01 DIAGNOSIS — R30.0 DYSURIA: ICD-10-CM

## 2024-07-01 DIAGNOSIS — Z98.890 OTHER SPECIFIED POSTPROCEDURAL STATES: Chronic | ICD-10-CM

## 2024-07-01 DIAGNOSIS — R33.9 RETENTION OF URINE, UNSPECIFIED: ICD-10-CM

## 2024-07-01 PROCEDURE — G0463: CPT

## 2024-08-12 ENCOUNTER — APPOINTMENT (OUTPATIENT)
Dept: UROLOGY | Facility: HOSPITAL | Age: 79
End: 2024-08-12

## 2024-08-12 VITALS
RESPIRATION RATE: 18 BRPM | WEIGHT: 134 LBS | TEMPERATURE: 208.76 F | HEART RATE: 103 BPM | HEIGHT: 61 IN | BODY MASS INDEX: 25.3 KG/M2 | SYSTOLIC BLOOD PRESSURE: 101 MMHG | DIASTOLIC BLOOD PRESSURE: 65 MMHG | OXYGEN SATURATION: 97 %

## 2024-08-12 DIAGNOSIS — N13.30 UNSPECIFIED HYDRONEPHROSIS: ICD-10-CM

## 2024-08-12 NOTE — ASSESSMENT
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 presenting for follow-up for right hydronephrosis  CT scan from last admission reviewed, suspect patient was not in retention at this time and the elevated PVRs were due to scanning of her pelvic mass. Pt continues to void well without complaints. no bladder scan performed as it would not be accurate.  Discussed with patient and family that previous renal lasix scan showed right kidney function of only 4%. As she is not having pain or fevers we will continue to observe. Reviewed ED precautions of fevers she may require a NT but as she's not having infections no indication at this time.  Plan - RTC in 1 year for annual follow up  - reviewed ED precautions of known right hydro - reviewed ED precautions of retention.

## 2024-08-12 NOTE — REVIEW OF SYSTEMS
[Fever] : no fever [Chills] : no chills [Chest Pain] : no chest pain [Shortness Of Breath] : no shortness of breath [Abdominal Pain] : no abdominal pain [Dysuria] : no dysuria

## 2024-08-12 NOTE — HISTORY OF PRESENT ILLNESS
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 presenting for follow-up for right hydronephrosis  CT scan from last admission reviewed, suspect patient was not in retention at this time and the elevated PVRs were due to scanning of her pelvic mass. passed tov at last visit   At follow up visit today, pt without complaints. Denies any fevers, flank pain, dysuria, hematuria, difficulty emptying bladder.

## 2024-08-12 NOTE — HISTORY OF PRESENT ILLNESS
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 presenting for follow-up for right hydronephrosis  CT scan from last admission reviewed, suspect patient was not in retention at this time and the elevated PVRs were due to scanning of her pelvic mass. passed tov at last visit   At follow up visit today, pt without complaints. Denies any fevers, flank pain, dysuria, hematuria, difficulty emptying bladder.

## 2024-08-12 NOTE — PHYSICAL EXAM
[Normal Appearance] : normal appearance [Abdomen Soft] : soft [] : no respiratory distress [Abdomen Tenderness] : non-tender [Costovertebral Angle Tenderness] : no ~M costovertebral angle tenderness [Oriented To Time, Place, And Person] : oriented to person, place, and time

## 2024-08-22 ENCOUNTER — APPOINTMENT (OUTPATIENT)
Dept: WOUND CARE | Facility: CLINIC | Age: 79
End: 2024-08-22
Payer: MEDICARE

## 2024-08-22 VITALS
SYSTOLIC BLOOD PRESSURE: 104 MMHG | BODY MASS INDEX: 25.3 KG/M2 | DIASTOLIC BLOOD PRESSURE: 58 MMHG | TEMPERATURE: 98.6 F | HEIGHT: 61 IN | WEIGHT: 134 LBS | HEART RATE: 108 BPM

## 2024-08-22 DIAGNOSIS — L89.95 PRESSURE ULCER OF UNSPECIFIED SITE, UNSTAGEABLE: ICD-10-CM

## 2024-08-22 DIAGNOSIS — Z78.9 OTHER SPECIFIED HEALTH STATUS: ICD-10-CM

## 2024-08-22 DIAGNOSIS — M79.89 OTHER SPECIFIED SOFT TISSUE DISORDERS: ICD-10-CM

## 2024-08-22 DIAGNOSIS — E11.9 TYPE 2 DIABETES MELLITUS W/OUT COMPLICATIONS: ICD-10-CM

## 2024-08-22 DIAGNOSIS — L89.154 PRESSURE ULCER OF SACRAL REGION, STAGE 4: ICD-10-CM

## 2024-08-22 DIAGNOSIS — Z83.49 FAMILY HISTORY OF OTHER ENDOCRINE, NUTRITIONAL AND METABOLIC DISEASES: ICD-10-CM

## 2024-08-22 DIAGNOSIS — Z86.718 PERSONAL HISTORY OF OTHER VENOUS THROMBOSIS AND EMBOLISM: ICD-10-CM

## 2024-08-22 PROCEDURE — 11043 DBRDMT MUSC&/FSCA 1ST 20/<: CPT

## 2024-08-22 PROCEDURE — 99204 OFFICE O/P NEW MOD 45 MIN: CPT | Mod: 25

## 2024-08-22 RX ORDER — LIDOCAINE 40 MG/G
4 PATCH TOPICAL
Refills: 0 | Status: ACTIVE | COMMUNITY

## 2024-08-22 RX ORDER — HYOSCYAMINE SULFATE 16 OZ
0.25 SOLUTION MISCELLANEOUS
Refills: 0 | Status: ACTIVE | COMMUNITY

## 2024-08-22 RX ORDER — GLUCOSAMINE HCL/CHONDROITIN SU 500-400 MG
3 CAPSULE ORAL
Refills: 0 | Status: ACTIVE | COMMUNITY

## 2024-08-22 RX ORDER — MULTIVIT-MIN/FOLIC/VIT K/LYCOP 400-300MCG
500 TABLET ORAL
Refills: 0 | Status: ACTIVE | COMMUNITY

## 2024-08-22 RX ORDER — ACETAMINOPHEN 325 MG/1
325 TABLET ORAL
Refills: 0 | Status: ACTIVE | COMMUNITY

## 2024-08-22 RX ORDER — SENNOSIDES 8.6 MG/1
8.6 CAPSULE, GELATIN COATED ORAL
Refills: 0 | Status: ACTIVE | COMMUNITY

## 2024-08-22 RX ORDER — COLLAGENASE SANTYL 250 [ARB'U]/G
250 OINTMENT TOPICAL
Refills: 0 | Status: ACTIVE | COMMUNITY

## 2024-08-27 PROBLEM — L89.95: Status: ACTIVE | Noted: 2024-08-27

## 2024-08-27 LAB — BACTERIA SPEC CULT: ABNORMAL

## 2024-08-27 NOTE — DATA REVIEWED
[FreeTextEntry1] : 77 y/o F with PMHx significant for DM2 (no meds), CKD, RLE edema, Thyroid goiter, Uterine Fibroids s/p KEI (2014) reportedly had findings of pelvic mass during hospitalization (07/2023) w/ subsequent cystoscopy performed demonstrating bladder diverticulum. She is now scheduled for Cystoscopy, Bladder Biopsy, Right Ureteroscopy w/ Biopsy with Dr. Sarah on 01/05/2024.     Medical History: COVID-19 Vaccine Assessment: - History of COVID-19 vaccination Yes  Allergies/Medications: Allergies: Allergies: No Known Allergies:  Past Medical, Past Surgical, and Family History: PAST MEDICAL HISTORY: CKD (chronic kidney disease)  Edema of right lower leg  Essential hypertension  Hyperlipidemia  T2DM (type 2 diabetes mellitus)  Thyroid goiter.  PAST SURGICAL HISTORY: Bladder diverticulum  H/O lumpectomy  H/O: hysterectomy  S/P cystoscopy.  Anesthesia History: - Previous Reaction to Anesthesia none - Family History of Anesthesia Reaction/Malignant Hyperthermia No - Latex Allergy No  Social History: Substance Use History: - Substance Use caffeine - Caffeine Type coffee - Caffeine Amount/Frequency 1-2 cups/cans per day

## 2024-08-27 NOTE — PHYSICAL EXAM
[Normal Breath Sounds] : Normal breath sounds [Normal Rate and Rhythm] : normal rate and rhythm [Ankle Swelling (On Exam)] : present [Please See PDF for Tissue Analytics] : Please See PDF for Tissue Analytics. [JVD] : no jugular venous distention  [Abdomen Tenderness] : ~T ~M No abdominal tenderness [de-identified] : on stretcher, not good historian [de-identified] : eomi, perrla, non verrbal [de-identified] : [id number]

## 2024-08-27 NOTE — ASSESSMENT
[FreeTextEntry1] :  79 y/o F with PMHx significant for DM2 (no meds), CKD, RLE edema,  sacral pu 4 /  chronic osteo , not candidate  for hbo right foot  1st met  dti/ check film no culture needed currently check labs next visit complexity - sacral 4 not ready for  vac dakins cleanser santyl   ag alginte/ abd pad or foam no dvt   last us march 24 betadine toe daily/ offload check nayan risk low  time 45

## 2024-08-27 NOTE — HISTORY OF PRESENT ILLNESS
[FreeTextEntry1] : location sacral pu  stage 4  and right foot toe ulcer- dti  severity was in hospital in may/june  now in  [place] re[facility]  timing/duration months  quality no bleeding, bone exposed  other  /79 y/o F with PMHx significant for DM2 (no meds), CKD, RLE edema,

## 2024-08-27 NOTE — HISTORY OF PRESENT ILLNESS
[FreeTextEntry1] : location sacral pu  stage 4  and right foot toe ulcer- dti  severity was in hospital in may/june  now in  [place] [facility]  timing/duration months  quality no bleeding, bone exposed  other  /77 y/o F with PMHx significant for DM2 (no meds), CKD, RLE edema,

## 2024-08-27 NOTE — PHYSICAL EXAM
[Normal Breath Sounds] : Normal breath sounds [Normal Rate and Rhythm] : normal rate and rhythm [Ankle Swelling (On Exam)] : present [Please See PDF for Tissue Analytics] : Please See PDF for Tissue Analytics. [JVD] : no jugular venous distention  [Abdomen Tenderness] : ~T ~M No abdominal tenderness [de-identified] : on stretcher, not good historian [de-identified] : eomi, perrla, non verrbal [de-identified] : [id number]

## 2024-09-04 NOTE — PROGRESS NOTE ADULT - ATTENDING COMMENTS
Briefly. this is a 79yo F with PMH significant for T2DM, HTN, HLD, CKD, and known thyroid goiter who presented after a mechanical fall while trying turn on the faucet in her shower. She has had 4 falls in total, most recent prior fall was 2021.    Patient seen and examined at bedside. Very pleasant and cooperative. Denies any acute complaints today. Reports feeling well. No obvious signs of trauma or injuries on exam. VSS. Recommended for outpatient PT.    #Elevated Cr  - patient noted with elevated Cr to 1.4-1.5 range  - unclear baseline    Cr elevated – 1.54 on presentation, then 1.46 on repeat. Trend for now. 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 Patient being bladder scanned and has urinary retention now s/p straight cath x2. If continues to retain urine will place ayala and consult urology. UA negative. Urology consulted today.     Patient was planned for work-up of thyroid goiter inpatient but seems to have had prior work-up done. ENT evaluated patient, had recommended CT neck and chest, and IR for US-guided biopsy. Has had a biopsy performed in the past as an outpatient, patient states this was 2022 in [place] and did not show malignancy. The goiter has been present for 6 years. Will see if her daughter or PMD can offer collateral. Will defer contrast imaging for now given possible IRENE. If collateral obtained from daughter/PMD, may potentially be deferred to outpatient follow-up. Briefly. this is a 79yo F with PMH significant for T2DM, HTN, HLD, CKD, and known thyroid goiter who presented after a mechanical fall while trying turn on the faucet in her shower. She has had 4 falls in total, most recent prior fall was 2021.    Patient seen and examined at bedside. Very pleasant and cooperative. Denies any acute complaints today. Reports feeling well. No obvious signs of trauma or injuries on exam. VSS. Recommended for outpatient PT.    #Elevated Cr  - patient noted with elevated Cr to 1.4-1.5 range  - unclear baseline, team attempted to reach patient's PCP but unable to reach  - overall patient with stable renal function   - likely at baseline  - normal urine output noted with ayala (placed after urinary retention noted on bladder scans)  - c/w flomax  - plan for outpatient f/u with urology for TOV    #Thyroid Goiter  - appears to be chronic  - previous outpatient workup including biopsy showed that it was not malignant per family  - plan for outpatient CT scan and further ENT follow up    Remainder of plan as outlined above.  Case and plan of care discussed with HS2 Male

## 2024-09-12 ENCOUNTER — OUTPATIENT (OUTPATIENT)
Dept: OUTPATIENT SERVICES | Facility: HOSPITAL | Age: 79
LOS: 1 days | End: 2024-09-12
Payer: MEDICARE

## 2024-09-12 ENCOUNTER — APPOINTMENT (OUTPATIENT)
Dept: WOUND CARE | Facility: HOSPITAL | Age: 79
End: 2024-09-12

## 2024-09-12 VITALS
TEMPERATURE: 100.1 F | BODY MASS INDEX: 25.3 KG/M2 | HEART RATE: 96 BPM | WEIGHT: 134 LBS | OXYGEN SATURATION: 95 % | RESPIRATION RATE: 16 BRPM | HEIGHT: 61 IN | DIASTOLIC BLOOD PRESSURE: 69 MMHG | SYSTOLIC BLOOD PRESSURE: 127 MMHG

## 2024-09-12 DIAGNOSIS — Z98.890 OTHER SPECIFIED POSTPROCEDURAL STATES: Chronic | ICD-10-CM

## 2024-09-12 DIAGNOSIS — N32.3 DIVERTICULUM OF BLADDER: Chronic | ICD-10-CM

## 2024-09-12 DIAGNOSIS — Z90.710 ACQUIRED ABSENCE OF BOTH CERVIX AND UTERUS: Chronic | ICD-10-CM

## 2024-09-12 PROCEDURE — G0463: CPT

## 2024-09-12 PROCEDURE — 99214 OFFICE O/P EST MOD 30 MIN: CPT

## 2024-09-12 PROCEDURE — 93970 EXTREMITY STUDY: CPT | Mod: 26

## 2024-09-12 RX ORDER — SODIUM HYPOCHLORITE 1.25 MG/ML
0.12 SOLUTION TOPICAL
Qty: 1 | Refills: 3 | Status: ACTIVE | COMMUNITY
Start: 2024-09-12 | End: 1900-01-01

## 2024-09-12 RX ORDER — AMOXICILLIN AND CLAVULANATE POTASSIUM 875; 125 MG/1; MG/1
875-125 TABLET, COATED ORAL
Qty: 20 | Refills: 1 | Status: ACTIVE | COMMUNITY
Start: 2024-09-12 | End: 1900-01-01

## 2024-09-12 NOTE — HISTORY OF PRESENT ILLNESS
[FreeTextEntry1] : location sacral pu  stage 4  and right foot toe ulcer- dti  severity was in hospital in may/june  now in  [place] re[facility]  timing/duration months  quality no bleeding, bone exposed  other  /79 y/o F with PMHx significant for DM2 (no meds), CKD, RLE edema,

## 2024-09-12 NOTE — PHYSICAL EXAM
[JVD] : no jugular venous distention  [Normal Breath Sounds] : Normal breath sounds [Normal Rate and Rhythm] : normal rate and rhythm [Ankle Swelling (On Exam)] : present [Abdomen Tenderness] : ~T ~M No abdominal tenderness [de-identified] : on stretcher, not good historian [de-identified] : eomi, perrla, non verrbal [de-identified] : hr108 [Please See PDF for Tissue Analytics] : Please See PDF for Tissue Analytics.

## 2024-09-12 NOTE — HISTORY OF PRESENT ILLNESS
[FreeTextEntry1] : location sacral pu  stage 4  and right foot toe ulcer- dti  severity was in hospital in may/june  now in  [place] re[facility]  timing/duration months  quality no bleeding, bone exposed  other  /77 y/o F with PMHx significant for DM2 (no meds), CKD, RLE edema,

## 2024-09-12 NOTE — PHYSICAL EXAM
[JVD] : no jugular venous distention  [Normal Breath Sounds] : Normal breath sounds [Normal Rate and Rhythm] : normal rate and rhythm [Ankle Swelling (On Exam)] : present [Abdomen Tenderness] : ~T ~M No abdominal tenderness [de-identified] : on stretcher, not good historian [de-identified] : eomi, perrla, non verrbal [de-identified] : hr108 [Please See PDF for Tissue Analytics] : Please See PDF for Tissue Analytics.

## 2024-09-16 ENCOUNTER — NON-APPOINTMENT (OUTPATIENT)
Age: 79
End: 2024-09-16

## 2024-09-16 RX ORDER — COLLAGENASE SANTYL 250 [ARB'U]/G
250 OINTMENT TOPICAL DAILY
Qty: 120 | Refills: 3 | Status: ACTIVE | COMMUNITY
Start: 2024-09-12 | End: 1900-01-01

## 2024-10-05 DIAGNOSIS — L89.95 PRESSURE ULCER OF UNSPECIFIED SITE, UNSTAGEABLE: ICD-10-CM

## 2024-10-10 ENCOUNTER — APPOINTMENT (OUTPATIENT)
Dept: WOUND CARE | Facility: HOSPITAL | Age: 79
End: 2024-10-10

## 2024-12-04 ENCOUNTER — APPOINTMENT (OUTPATIENT)
Dept: GYNECOLOGIC ONCOLOGY | Facility: HOSPITAL | Age: 79
End: 2024-12-04
Payer: MEDICARE

## 2024-12-04 ENCOUNTER — OUTPATIENT (OUTPATIENT)
Dept: OUTPATIENT SERVICES | Facility: HOSPITAL | Age: 79
LOS: 1 days | End: 2024-12-04

## 2024-12-04 VITALS
DIASTOLIC BLOOD PRESSURE: 71 MMHG | HEART RATE: 107 BPM | HEIGHT: 61 IN | SYSTOLIC BLOOD PRESSURE: 149 MMHG | BODY MASS INDEX: 22.84 KG/M2 | WEIGHT: 121 LBS | TEMPERATURE: 98 F

## 2024-12-04 DIAGNOSIS — Z98.890 OTHER SPECIFIED POSTPROCEDURAL STATES: Chronic | ICD-10-CM

## 2024-12-04 DIAGNOSIS — N32.3 DIVERTICULUM OF BLADDER: Chronic | ICD-10-CM

## 2024-12-04 DIAGNOSIS — Z90.710 ACQUIRED ABSENCE OF BOTH CERVIX AND UTERUS: Chronic | ICD-10-CM

## 2024-12-04 PROCEDURE — 99214 OFFICE O/P EST MOD 30 MIN: CPT

## 2024-12-06 DIAGNOSIS — N93.9 ABNORMAL UTERINE AND VAGINAL BLEEDING, UNSPECIFIED: ICD-10-CM

## 2024-12-06 DIAGNOSIS — C80.1 MALIGNANT (PRIMARY) NEOPLASM, UNSPECIFIED: ICD-10-CM

## 2024-12-06 DIAGNOSIS — R19.00 INTRA-ABDOMINAL AND PELVIC SWELLING, MASS AND LUMP, UNSPECIFIED SITE: ICD-10-CM

## 2024-12-06 DIAGNOSIS — N89.8 OTHER SPECIFIED NONINFLAMMATORY DISORDERS OF VAGINA: ICD-10-CM

## 2025-09-16 ENCOUNTER — APPOINTMENT (OUTPATIENT)
Dept: OTOLARYNGOLOGY | Facility: CLINIC | Age: 80
End: 2025-09-16
Payer: MEDICARE

## 2025-09-16 VITALS
SYSTOLIC BLOOD PRESSURE: 98 MMHG | DIASTOLIC BLOOD PRESSURE: 55 MMHG | WEIGHT: 121 LBS | BODY MASS INDEX: 22.84 KG/M2 | HEART RATE: 90 BPM | OXYGEN SATURATION: 98 % | HEIGHT: 61 IN

## 2025-09-16 DIAGNOSIS — R13.10 DYSPHAGIA, UNSPECIFIED: ICD-10-CM

## 2025-09-16 DIAGNOSIS — C80.1 MALIGNANT (PRIMARY) NEOPLASM, UNSPECIFIED: ICD-10-CM

## 2025-09-16 PROCEDURE — 99203 OFFICE O/P NEW LOW 30 MIN: CPT | Mod: 25

## 2025-09-16 PROCEDURE — 31575 DIAGNOSTIC LARYNGOSCOPY: CPT

## 2025-09-16 RX ORDER — ALLOPURINOL 200 MG/1
TABLET ORAL
Refills: 0 | Status: ACTIVE | COMMUNITY

## 2025-09-16 RX ORDER — FERROUS SULFATE 325(65) MG
325 TABLET ORAL
Refills: 0 | Status: ACTIVE | COMMUNITY

## (undated) DEVICE — SOL IRR BAG NS 0.9% 3000ML

## (undated) DEVICE — PACK CYSTO

## (undated) DEVICE — GLV 7 PROTEXIS (WHITE)

## (undated) DEVICE — SYR ASEPTO

## (undated) DEVICE — CABLE DAC ACTIVE CORD

## (undated) DEVICE — GOWN TRIMAX LG

## (undated) DEVICE — BAG URINE W METER 2L

## (undated) DEVICE — GLV 8 PROTEXIS (WHITE)

## (undated) DEVICE — VENODYNE/SCD SLEEVE CALF LARGE

## (undated) DEVICE — GLV 6.5 PROTEXIS (WHITE)

## (undated) DEVICE — POSITIONER FOAM EGG CRATE ULNAR 2PCS (PINK)

## (undated) DEVICE — ACMI SELF-SEALING SEAL UP TO 7FR

## (undated) DEVICE — SOL IRR BAG H2O 3000ML

## (undated) DEVICE — SYR IRRIGATION PISTON 60CC

## (undated) DEVICE — GLV 7.5 PROTEXIS (WHITE)

## (undated) DEVICE — FOLEY TRAY 16FR 5CC LTX UMETER CLOSED

## (undated) DEVICE — WARMING BLANKET UPPER ADULT

## (undated) DEVICE — SPECIMEN CONTAINER 100ML

## (undated) DEVICE — TUBING RANGER FLUID IRRIGATION SET DISP

## (undated) DEVICE — ELCTR HF RESECTION LOOP 26FR 12 DEG

## (undated) DEVICE — ELCTR BUGBEE FULGATING 5FR X 58CM

## (undated) DEVICE — SOL IRR POUR H2O 1500ML